# Patient Record
Sex: FEMALE | Race: OTHER | HISPANIC OR LATINO | Employment: FULL TIME | URBAN - METROPOLITAN AREA
[De-identification: names, ages, dates, MRNs, and addresses within clinical notes are randomized per-mention and may not be internally consistent; named-entity substitution may affect disease eponyms.]

---

## 2017-04-17 ENCOUNTER — GENERIC CONVERSION - ENCOUNTER (OUTPATIENT)
Dept: OTHER | Facility: OTHER | Age: 42
End: 2017-04-17

## 2017-04-24 ENCOUNTER — ALLSCRIPTS OFFICE VISIT (OUTPATIENT)
Dept: OTHER | Facility: OTHER | Age: 42
End: 2017-04-24

## 2017-04-24 DIAGNOSIS — Z12.31 ENCOUNTER FOR SCREENING MAMMOGRAM FOR MALIGNANT NEOPLASM OF BREAST: ICD-10-CM

## 2017-05-01 LAB
ADEQUACY: (HISTORICAL): NORMAL
CLINICIAN PROVIDIED ICD 9 OR 10 (HISTORICAL): NORMAL
COMMENT (HISTORICAL): NORMAL
DIAGNOSIS (HISTORICAL): NORMAL
NOTE: (HISTORICAL): NORMAL
PERFORMED BY (HISTORICAL): NORMAL
QC REVIEWED BY (HISTORICAL): NORMAL
REFLEX (HISTORICAL): NORMAL
TEST INFORMATION (HISTORICAL): NORMAL

## 2017-08-11 LAB — EXTERNAL HIV SCREEN: NORMAL

## 2017-09-29 ENCOUNTER — HOSPITAL ENCOUNTER (EMERGENCY)
Facility: HOSPITAL | Age: 42
Discharge: HOME | End: 2017-09-29
Admitting: EMERGENCY MEDICINE
Payer: COMMERCIAL

## 2017-09-29 VITALS
OXYGEN SATURATION: 98 % | TEMPERATURE: 98.1 F | SYSTOLIC BLOOD PRESSURE: 130 MMHG | DIASTOLIC BLOOD PRESSURE: 84 MMHG | HEART RATE: 82 BPM | RESPIRATION RATE: 18 BRPM | WEIGHT: 196.21 LBS

## 2017-09-29 DIAGNOSIS — G89.29 ACUTE EXACERBATION OF CHRONIC LOW BACK PAIN: Primary | ICD-10-CM

## 2017-09-29 DIAGNOSIS — M54.50 ACUTE EXACERBATION OF CHRONIC LOW BACK PAIN: Primary | ICD-10-CM

## 2017-09-29 LAB
BILIRUB UR QL STRIP: NEGATIVE
CLARITY UR: CLEAR
CLARITY, POC: CLEAR
COLOR UR: YELLOW
COLOR, POC: YELLOW
EXT PREG TEST URINE: NEGATIVE
GLUCOSE UR STRIP-MCNC: NEGATIVE MG/DL
HGB UR QL STRIP.AUTO: NEGATIVE
KETONES UR STRIP-MCNC: NEGATIVE MG/DL
LEUKOCYTE ESTERASE UR QL STRIP: NEGATIVE
NITRITE UR QL STRIP: NEGATIVE
PH UR STRIP.AUTO: 7 [PH] (ref 4.5–8)
PROT UR STRIP-MCNC: NEGATIVE MG/DL
SP GR UR STRIP.AUTO: 1.02 (ref 1–1.03)
UROBILINOGEN UR QL STRIP.AUTO: 0.2 E.U./DL

## 2017-09-29 PROCEDURE — 81002 URINALYSIS NONAUTO W/O SCOPE: CPT | Performed by: PHYSICIAN ASSISTANT

## 2017-09-29 PROCEDURE — 81025 URINE PREGNANCY TEST: CPT | Performed by: PHYSICIAN ASSISTANT

## 2017-09-29 PROCEDURE — 81003 URINALYSIS AUTO W/O SCOPE: CPT

## 2017-09-29 PROCEDURE — 96372 THER/PROPH/DIAG INJ SC/IM: CPT

## 2017-09-29 PROCEDURE — 99283 EMERGENCY DEPT VISIT LOW MDM: CPT

## 2017-09-29 RX ORDER — NAPROXEN 500 MG/1
500 TABLET ORAL 2 TIMES DAILY WITH MEALS
Qty: 12 TABLET | Refills: 0 | Status: SHIPPED | OUTPATIENT
Start: 2017-09-29 | End: 2018-06-23

## 2017-09-29 RX ORDER — KETOROLAC TROMETHAMINE 30 MG/ML
15 INJECTION, SOLUTION INTRAMUSCULAR; INTRAVENOUS ONCE
Status: COMPLETED | OUTPATIENT
Start: 2017-09-29 | End: 2017-09-29

## 2017-09-29 RX ORDER — METHOCARBAMOL 500 MG/1
500 TABLET, FILM COATED ORAL 3 TIMES DAILY
Qty: 10 TABLET | Refills: 0 | Status: SHIPPED | OUTPATIENT
Start: 2017-09-29 | End: 2018-06-23

## 2017-09-29 RX ADMIN — KETOROLAC TROMETHAMINE 15 MG: 30 INJECTION, SOLUTION INTRAMUSCULAR at 16:09

## 2017-09-29 NOTE — ED PROVIDER NOTES
History  Chief Complaint   Patient presents with    Back Pain     hx of back problems c/o lower back pain since last night denies recent strain or injury     Patient is a 44-year-old female who presents for evaluation of lower back pain  She states she does have a history of chronic back pain  She states this has been intermittent over the past few years  She did physical therapy approximately 2 years which did help her symptoms  She states her pain started last night after work  She describes a tight spasming sensation across her lower back that is constant in nature  Sitting and movement makes the pain worse  She tried Advil last night with minimal relief  She denies any injury or trauma  She denies any history of cancer or IV drug use  She denies fever, chills, nausea vomiting diarrhea, chest pain, shortness breath, abdominal pain, numbness or weakness, bladder or bowel dysfunction, saddle anesthesia, dysuria, hematuria, joint pain  None       History reviewed  No pertinent past medical history  History reviewed  No pertinent surgical history  History reviewed  No pertinent family history  I have reviewed and agree with the history as documented  Social History   Substance Use Topics    Smoking status: Never Smoker    Smokeless tobacco: Never Used    Alcohol use No        Review of Systems   Constitutional: Negative for chills and fever  Respiratory: Negative for shortness of breath  Cardiovascular: Negative for chest pain  Gastrointestinal: Negative for abdominal pain, diarrhea, nausea and vomiting  Genitourinary: Negative for dysuria and hematuria  Musculoskeletal: Positive for back pain  Negative for arthralgias, gait problem, neck pain and neck stiffness  Skin: Negative for rash  Neurological: Negative for weakness and numbness  All other systems reviewed and are negative        Physical Exam  ED Triage Vitals [09/29/17 1442]   Temperature Pulse Respirations Blood Pressure SpO2   98 1 °F (36 7 °C) 82 18 130/84 98 %      Temp Source Heart Rate Source Patient Position - Orthostatic VS BP Location FiO2 (%)   Oral Monitor Sitting Right arm --      Pain Score       Worst Possible Pain           Physical Exam   Constitutional: She is oriented to person, place, and time  She appears well-developed and well-nourished  No distress  HENT:   Head: Normocephalic and atraumatic  Right Ear: External ear normal    Left Ear: External ear normal    Nose: Nose normal    Eyes: Conjunctivae and EOM are normal    Neck: Normal range of motion  Cardiovascular: Normal rate, regular rhythm and normal heart sounds  Exam reveals no gallop and no friction rub  No murmur heard  Pulmonary/Chest: Effort normal and breath sounds normal  No respiratory distress  She has no wheezes  She has no rales  Abdominal: Soft  Bowel sounds are normal  She exhibits no distension  There is no tenderness  There is no guarding  Musculoskeletal:   Reproducible tenderness to palpation across the bilateral paravertebral and low back muscles of the lumbar region  No midline spinous process tenderness to palpation  No deformity or step-off  No erythema, swelling, ecchymosis or sign of trauma  No palpable spasm  Neurovascularly intact distally  Patellar deep tendon reflexes intact bilaterally  Extensor hallucis longus intact bilaterally  Able to move from standing to sitting to lying without difficulty  Negative straight leg raise bilaterally  Neurological: She is alert and oriented to person, place, and time  She has normal strength  She is not disoriented  No sensory deficit  Gait normal  GCS eye subscore is 4  GCS verbal subscore is 5  GCS motor subscore is 6  Skin: Skin is warm and dry  She is not diaphoretic  Psychiatric: She has a normal mood and affect  Her behavior is normal  Judgment and thought content normal    Nursing note and vitals reviewed        ED Medications  Medications ketorolac (TORADOL) 30 mg/mL injection 15 mg (15 mg Intramuscular Given 9/29/17 5607)       Diagnostic Studies  Labs Reviewed   POCT PREGNANCY, URINE - Normal       Result Value Ref Range Status    EXT PREG TEST UR (Ref: Negative) Negative   Final   POCT URINALYSIS DIPSTICK - Normal    Color, UA yellow   Final    Clarity, UA Clear   Final   ED URINE MACROSCOPIC - Normal    Color, UA Yellow   Final    Clarity, UA Clear   Final    pH, UA 7 0  4 5 - 8 0 Final    Leukocytes, UA Negative  Negative Final    Nitrite, UA Negative  Negative Final    Protein, UA Negative  Negative mg/dl Final    Glucose, UA Negative  Negative mg/dl Final    Ketones, UA Negative  Negative mg/dl Final    Urobilinogen, UA 0 2  0 2, 1 0 E U /dl E U /dl Final    Bilirubin, UA Negative  Negative Final    Blood, UA Negative  Negative Final    Specific Gravity, UA 1 020  1 003 - 1 030 Final    Narrative:     CLINITEK RESULT       No orders to display       Procedures  Procedures      Phone Contacts  ED Phone Contact    ED Course  ED Course                                MDM  Number of Diagnoses or Management Options  Acute exacerbation of chronic low back pain:   Diagnosis management comments: Plan- will check a pregnancy and urine dip  Neg preg and urine dip  Will give toradol here for pain  Discussed results with the patient and will give prescription for naproxen and Robaxin  Explained not to take naproxen for approximately 8 hours after leaving the ED since she received Toradol here  No driving or working while taking Robaxin as it may cause drowsiness  Instructed her to follow up with her family doctor in 1 day  Also given contact information for the on[facility]call [facility]  Instructed her to return to the ED if symptoms worsen or new symptoms arise  Patient states understanding and agrees with plan         Amount and/or Complexity of Data Reviewed  Clinical lab tests: ordered and reviewed    Risk of Complications, Morbidity, and/or Mortality  Presenting problems: low  Diagnostic procedures: low  Management options: low    Patient Progress  Patient progress: stable    CritCare Time    Disposition  Final diagnoses:   Acute exacerbation of chronic low back pain     ED Disposition     ED Disposition Condition Comment    Discharge  Ernesto Cortez discharge to home/self care  Condition at discharge: Good        Follow-up Information     Follow up With Specialties Details Why Contact Info Additional Information    Follow up with your family doctor in 1 day          [facility]  Schedule an appointment as soon as possible for a visit As needed if you do not have a doctor  [address] [facility] [address] [facility] Medicine   [address] [place] [address] Drive  140 [facility] Emergency Department Emergency Medicine  If symptoms worsen or new symptoms arise as discussed  [address] [facility]  [phone] [address] [facility] ED, [address] [facility] [place], [place], [address]        Discharge Medication List as of 9/29/2017  4:10 PM      START taking these medications    Details   methocarbamol (ROBAXIN) 500 mg tablet Take 1 tablet by mouth 3 (three) times a day, Starting Fri 9/29/2017, Print      naproxen (EC NAPROSYN) 500 MG EC tablet Take 1 tablet by mouth 2 (two) times a day with meals, Starting Fri 9/29/2017, Print           No discharge procedures on file      ED Provider  Electronically Signed by     Debra Fiore PA-C  09/30/17 2123

## 2017-09-29 NOTE — DISCHARGE INSTRUCTIONS
Acute Low Back Pain, Ambulatory Care   GENERAL INFORMATION:   Acute low back pain  is discomfort in your lower back area that lasts for less than 12 weeks  The word acute is used to describe pain that starts suddenly, worsens quickly, and lasts for a short time  Common symptoms include the following:   · Back stiffness or spasms    · Pain down the back or side of one leg    · Holding yourself in an unusual position or posture to decrease your back pain    · Not being able to find a sitting position that is comfortable    · Slow increase in your pain for 24 to 48 hours after you stress your back    · Tenderness on your lower back or severe pain when you move your back  Seek immediate care for the following symptoms:   · Severe pain    · Sudden stiffness and heaviness in both buttocks down to both legs    · Numbness or weakness in one leg, or pain in both legs    · Numbness in your genital area or across your lower back    · Unable to control your urine or bowel movements  Treatment for acute low back pain  may include any of the following:  · Medicines:      ¨ NSAIDs  help decrease swelling and pain or fever  This medicine is available with or without a doctor's order  NSAIDs can cause stomach bleeding or kidney problems in certain people  If you take blood thinner medicine, always ask your healthcare provider if NSAIDs are safe for you  Always read the medicine label and follow directions  ¨ Muscle relaxers  help decrease muscle spasms pain  ¨ Prescription pain medicine  may be given  Ask how to take this medicine safely  · Surgery  may be needed if your pain is severe and other treatments do not work  Surgery may be needed for conditions of the lumbar spine, such as herniated disc or spinal stenosis  Manage your symptoms:   · Sleep on a firm mattress  If you do not have a firm mattress, have someone move your mattress to the floor for a few days   A piece of plywood under your mattress can also help make it firmer  · Apply ice  on your lower back for 15 to 20 minutes every hour or as directed  Use an ice pack, or put crushed ice in a plastic bag  Cover it with a towel  Ice helps prevent tissue damage and decreases swelling and pain  You can alternate ice and heat  · Apply heat  on your lower back for 20 to 30 minutes every 2 hours for as many days as directed  Heat helps decrease pain and muscle spasms  · Go to physical therapy  A physical therapist teaches you exercises to help improve movement and strength, and to decrease pain  Prevent acute low back pain:   · Use proper body mechanics  ¨ Bend at the hips and knees when you  objects  Do not bend from the waist  Use your leg muscles as you lift the load  Do not use your back  Keep the object close to your chest as you lift it  Try not to twist or lift anything above your waist     ¨ Change your position often when you stand for long periods of time  Rest one foot on a small box or footrest, and then switch to the other foot often  ¨ Try not to sit for long periods of time  When you do, sit in a straight-backed chair with your feet flat on the floor  Never reach, pull, or push while you are sitting  · Exercise regularly  Warm up before you exercise  Do exercises that strengthen your back muscles  Ask about the best exercise plan for you  · Maintain a healthy weight  Ask your healthcare provider how much you should weigh  Ask him to help you create a weight loss plan if you are overweight  Follow up with your healthcare provider as directed:  Return for a follow-up visit if you still have pain after 1 to 3 weeks of treatment  You may need to visit an orthopedist if your back pain lasts more than 6 to 12 weeks  Write down your questions so you remember to ask them during your visits  CARE AGREEMENT:   You have the right to help plan your care  Learn about your health condition and how it may be treated   Discuss treatment options with your caregivers to decide what care you want to receive  You always have the right to refuse treatment  The above information is an  only  It is not intended as medical advice for individual conditions or treatments  Talk to your doctor, nurse or pharmacist before following any medical regimen to see if it is safe and effective for you  © 2014 [address] is for End User's use only and may not be sold, redistributed or otherwise used for commercial purposes  All illustrations and images included in CareNotes® are the copyrighted property of [employer]  or [facility]  Back Pain   WHAT YOU NEED TO KNOW:   Back pain is common  It can be caused by many conditions, such as arthritis or the breakdown of spinal discs  Your risk for back pain is increased by injuries, lack of activity, or repeated bending and twisting  You may feel sore or stiff on one or both sides of your back  The pain may spread to your buttocks or thighs  DISCHARGE INSTRUCTIONS:   Medicines:   · NSAIDs  help decrease swelling and pain  This medicine is available with or without a doctor's order  NSAIDs can cause stomach bleeding or kidney problems in certain people  If you take blood thinner medicine, always ask your healthcare provider if NSAIDs are safe for you  Always read the medicine label and follow directions  · Acetaminophen  decreases pain  It is available without a doctor's order  Ask how much to take and how often to take it  Follow directions  Acetaminophen can cause liver damage if not taken correctly  · Prescription pain medicine  may be given  Ask your healthcare provider how to take this medicine safely  · Take your medicine as directed  Contact your healthcare provider if you think your medicine is not helping or if you have side effects  Tell him or her if you are allergic to any medicine  Keep a list of the medicines, vitamins, and herbs you take  Include the amounts, and when and why you take them  Bring the list or the pill bottles to follow-up visits  Carry your medicine list with you in case of an emergency  Follow up with your healthcare provider in 2 weeks, or as directed:  Write down your questions so you remember to ask them during your visits  How to manage your back pain:   · Apply ice  on your back or affected area for 15 to 20 minutes every hour or as directed  Use an ice pack, or put crushed ice in a plastic bag  Cover it with a towel  Ice helps prevent tissue damage and decreases pain  · Apply heat  on your back or affected area for 20 to 30 minutes every 2 hours for as many days as directed  Heat helps decrease pain and muscle spasms  · Stay active  as much as you can without causing more pain  Bed rest could make your back pain worse  Avoid heavy lifting until your pain is gone  Return to the emergency department if:   · You have pain, numbness, or weakness in one or both legs  · Your pain becomes so severe that you cannot walk  · You cannot control your urine or bowel movements  · You have severe back pain with chest pain  · You have severe back pain, nausea, and vomiting  · You have severe back pain that spreads to your side or genital area  Contact your healthcare provider if:   · You have back pain that does not get better with rest and pain medicine  · You have a fever  · You have pain that worsens when you are on your back or when you rest     · You have pain that worsens when you cough or sneeze  · You lose weight without trying  · You have questions or concerns about your condition or care  © 2017 [address] Information is for End User's use only and may not be sold, redistributed or otherwise used for commercial purposes  All illustrations and images included in CareNotes® are the copyrighted property of [employer]  or [employer]    The above information is an  only  It is not intended as medical advice for individual conditions or treatments  Talk to your doctor, nurse or pharmacist before following any medical regimen to see if it is safe and effective for you

## 2017-09-29 NOTE — ED NOTES
Patient reports since midnight has had pain in mid lower back       Emilie Vazquez RN  09/29/17 8163

## 2018-01-09 NOTE — MISCELLANEOUS
Provider Comments  Provider Comments:   Pt no showed today 04/17/2017 lmom asking pt to give office a call back      Signatures   Electronically signed by : Yissel Rice, ; Apr 17 2017  9:41AM EST                       (Author)

## 2018-01-13 VITALS
SYSTOLIC BLOOD PRESSURE: 110 MMHG | HEIGHT: 68 IN | BODY MASS INDEX: 29.55 KG/M2 | WEIGHT: 195 LBS | DIASTOLIC BLOOD PRESSURE: 70 MMHG

## 2018-05-10 ENCOUNTER — TRANSCRIBE ORDERS (OUTPATIENT)
Dept: ADMINISTRATIVE | Facility: HOSPITAL | Age: 43
End: 2018-05-10

## 2018-05-10 ENCOUNTER — APPOINTMENT (OUTPATIENT)
Dept: LAB | Facility: HOSPITAL | Age: 43
End: 2018-05-10
Attending: OBSTETRICS & GYNECOLOGY
Payer: COMMERCIAL

## 2018-05-10 ENCOUNTER — ULTRASOUND (OUTPATIENT)
Dept: OBGYN CLINIC | Facility: CLINIC | Age: 43
End: 2018-05-10
Payer: COMMERCIAL

## 2018-05-10 ENCOUNTER — ANNUAL EXAM (OUTPATIENT)
Dept: OBGYN CLINIC | Facility: CLINIC | Age: 43
End: 2018-05-10

## 2018-05-10 VITALS
BODY MASS INDEX: 30.45 KG/M2 | HEIGHT: 67 IN | SYSTOLIC BLOOD PRESSURE: 114 MMHG | WEIGHT: 194 LBS | DIASTOLIC BLOOD PRESSURE: 72 MMHG

## 2018-05-10 DIAGNOSIS — Z98.51 TUBAL LIGATION STATUS: ICD-10-CM

## 2018-05-10 DIAGNOSIS — Z12.4 SCREENING FOR CERVICAL CANCER: ICD-10-CM

## 2018-05-10 DIAGNOSIS — Z01.419 WOMEN'S ANNUAL ROUTINE GYNECOLOGICAL EXAMINATION: Primary | ICD-10-CM

## 2018-05-10 DIAGNOSIS — R23.2 FLUSHING: ICD-10-CM

## 2018-05-10 DIAGNOSIS — N91.4 SECONDARY OLIGOMENORRHEA: ICD-10-CM

## 2018-05-10 DIAGNOSIS — R23.2 HOT FLASHES: ICD-10-CM

## 2018-05-10 DIAGNOSIS — N91.2 AMENORRHEA: ICD-10-CM

## 2018-05-10 DIAGNOSIS — R23.2 FLUSHING: Primary | ICD-10-CM

## 2018-05-10 DIAGNOSIS — B35.4 TINEA CORPORIS: ICD-10-CM

## 2018-05-10 DIAGNOSIS — N83.292 COMPLEX CYST OF LEFT OVARY: Primary | ICD-10-CM

## 2018-05-10 LAB
B-HCG SERPL-ACNC: <2 MIU/ML
ERYTHROCYTE [DISTWIDTH] IN BLOOD BY AUTOMATED COUNT: 13.6 % (ref 11.6–15.1)
FSH SERPL-ACNC: 1.8 MIU/ML
HCT VFR BLD AUTO: 42.8 % (ref 34.8–46.1)
HGB BLD-MCNC: 14.4 G/DL (ref 11.5–15.4)
LH SERPL-ACNC: 1.2 MIU/ML
MCH RBC QN AUTO: 28.7 PG (ref 26.8–34.3)
MCHC RBC AUTO-ENTMCNC: 33.6 G/DL (ref 31.4–37.4)
MCV RBC AUTO: 85 FL (ref 82–98)
PLATELET # BLD AUTO: 225 THOUSANDS/UL (ref 149–390)
PMV BLD AUTO: 10.7 FL (ref 8.9–12.7)
PROLACTIN SERPL-MCNC: 6.3 NG/ML
RBC # BLD AUTO: 5.02 MILLION/UL (ref 3.81–5.12)
SL AMB POCT URINE HCG: NORMAL
TSH SERPL DL<=0.05 MIU/L-ACNC: 2.05 UIU/ML (ref 0.36–3.74)
WBC # BLD AUTO: 7.75 THOUSAND/UL (ref 4.31–10.16)

## 2018-05-10 PROCEDURE — 81025 URINE PREGNANCY TEST: CPT | Performed by: OBSTETRICS & GYNECOLOGY

## 2018-05-10 PROCEDURE — 83002 ASSAY OF GONADOTROPIN (LH): CPT

## 2018-05-10 PROCEDURE — 36415 COLL VENOUS BLD VENIPUNCTURE: CPT

## 2018-05-10 PROCEDURE — 84443 ASSAY THYROID STIM HORMONE: CPT

## 2018-05-10 PROCEDURE — 84146 ASSAY OF PROLACTIN: CPT

## 2018-05-10 PROCEDURE — 83001 ASSAY OF GONADOTROPIN (FSH): CPT

## 2018-05-10 PROCEDURE — 85027 COMPLETE CBC AUTOMATED: CPT

## 2018-05-10 PROCEDURE — 76830 TRANSVAGINAL US NON-OB: CPT

## 2018-05-10 PROCEDURE — S0612 ANNUAL GYNECOLOGICAL EXAMINA: HCPCS | Performed by: OBSTETRICS & GYNECOLOGY

## 2018-05-10 PROCEDURE — 76856 US EXAM PELVIC COMPLETE: CPT

## 2018-05-10 PROCEDURE — 84702 CHORIONIC GONADOTROPIN TEST: CPT

## 2018-05-10 RX ORDER — CLOTRIMAZOLE AND BETAMETHASONE DIPROPIONATE 10; .64 MG/G; MG/G
CREAM TOPICAL 2 TIMES DAILY
Qty: 30 G | Refills: 0 | Status: SHIPPED | OUTPATIENT
Start: 2018-05-10 | End: 2020-06-17 | Stop reason: SDUPTHER

## 2018-05-10 NOTE — PROGRESS NOTES
AMB US Pelvic Non OB  Date/Time: 5/10/2018 10:34 AM  Performed by: Linton Primrose  Authorized by: Steph Sosa     Procedure details:     Indications: ovarian cysts, non-obstetric abdominal pain and non-obstetric vaginal bleeding      Technique:  US Pelvic, Non-OB with complete exam  Uterine findings:     Diameter (mm):  85    Length (mm):  47    Width (mm):  55    Uterine adhesions: not identified      Adnexal mass: identified      Location:  Mass left    Polyps: not identified      Myomas: not identified      Endometrial stripe: identified      Endometrium thickness (mm):  11  Left ovary findings:     Left ovary:  Visualized    Cysts: identified      Diameter (mm):  42    Length (mm):  30    Width (mm):  30    Flow:  Absent  Right ovary findings:     Right ovary:  Visualized    Diameter (mm):  26    Length (mm):  20    Width (mm):  25  Other findings:     Free pelvic fluid: not identified      Free peritoneal fluid: not identified    Post-Procedure Details:     Impression:  LT Complex cystic mass=24 x 23 x 20 mm,with no doppler flow within    Tolerance:   Tolerated well, no immediate complications

## 2018-05-10 NOTE — PROGRESS NOTES
Assessment   Annual well-woman exam  Secondary oligomenorrhea  Menopausal syndrome with hot flashes and vaginal dryness  Tinea corporis, chest and axilla  Status post tubal ligation         Plan   Screening laboratory studies  Pelvic ultrasound  Bilateral mammography  Lotrisone cream for tinea corporis  Return in 2 weeks to review test results and plan of care   All questions answered  Await pap smear results  Subjective annual exam     Ernesto Cortez is a 41 y o  female who presents for annual exam  Periods are irregular, lasting 3 days  Dysmenorrhea:mild, occurring throughout menses  Cyclic symptoms include none  No intermenstrual bleeding, spotting, or discharge  The patient reports that there is not domestic violence in her life  Current contraception: tubal ligation  History of abnormal Pap smear: no  Family history of uterine or ovarian cancer: no  Regular self breast exam: yes  History of abnormal mammogram: no  Family history of breast cancer: no  History of abnormal lipids: no  Menstrual History:  OB History     No data available         Menarche age: 17  Patient's last menstrual period was 03/12/2018 (exact date)  Review of Systems  Pertinent items are noted in HPI  Objective here for annual exam     /72 (BP Location: Left arm, Patient Position: Sitting, Cuff Size: Large)   Ht 5' 7" (1 702 m)   Wt 88 kg (194 lb)   LMP 03/12/2018 (Exact Date)   Breastfeeding?  No   BMI 30 38 kg/m²     General:   alert and oriented, in no acute distress, alert, appears stated age and cooperative   Heart: regular rate and rhythm, S1, S2 normal, no murmur, click, rub or gallop   Lungs: clear to auscultation bilaterally   Abdomen: soft, non-tender, without masses or organomegaly   Vulva: normal   Vagina: normal mucosa   Cervix: no bleeding following Pap, no cervical motion tenderness, no lesions and retroverted   Uterus: normal size, mobile, non-tender, normal shape and consistency, retroverted Adnexa: normal adnexa   Breast exam bilateral breast exam in the sitting and supine position with chaperone present no visible or palpable breast lesions or masses noted  No supraclavicular or axillary lymphadenopathy noted no nipple discharge noted   She does have dermatitis type rash along the outer cup edge of her bra and in her axilla with dryness and irritation and urticaria

## 2018-05-11 ENCOUNTER — HOSPITAL ENCOUNTER (OUTPATIENT)
Dept: RADIOLOGY | Age: 43
Discharge: HOME | End: 2018-05-11
Payer: COMMERCIAL

## 2018-05-11 DIAGNOSIS — Z01.419 WOMEN'S ANNUAL ROUTINE GYNECOLOGICAL EXAMINATION: ICD-10-CM

## 2018-05-11 PROCEDURE — 77067 SCR MAMMO BI INCL CAD: CPT

## 2018-05-11 PROCEDURE — 77063 BREAST TOMOSYNTHESIS BI: CPT

## 2018-05-14 LAB
CLINICAL INFO: NORMAL
CYTO CVX: NORMAL
DATE PREVIOUS BX: NORMAL
LMP START DATE: NORMAL
SL AMB PREV. PAP:: NORMAL
SPECIMEN SOURCE CVX/VAG CYTO: NORMAL

## 2018-05-24 ENCOUNTER — OFFICE VISIT (OUTPATIENT)
Dept: OBGYN CLINIC | Facility: CLINIC | Age: 43
End: 2018-05-24
Payer: COMMERCIAL

## 2018-05-24 VITALS
BODY MASS INDEX: 30.64 KG/M2 | SYSTOLIC BLOOD PRESSURE: 120 MMHG | WEIGHT: 195.2 LBS | HEIGHT: 67 IN | DIASTOLIC BLOOD PRESSURE: 82 MMHG

## 2018-05-24 DIAGNOSIS — N83.292 OTHER OVARIAN CYST, LEFT SIDE: Primary | ICD-10-CM

## 2018-05-24 DIAGNOSIS — N95.1 MENOPAUSAL SYNDROME (HOT FLASHES): ICD-10-CM

## 2018-05-24 PROCEDURE — 99214 OFFICE O/P EST MOD 30 MIN: CPT | Performed by: OBSTETRICS & GYNECOLOGY

## 2018-05-24 RX ORDER — ACETAMINOPHEN AND CODEINE PHOSPHATE 120; 12 MG/5ML; MG/5ML
1 SOLUTION ORAL DAILY
Qty: 30 TABLET | Refills: 2 | Status: SHIPPED | OUTPATIENT
Start: 2018-05-24 | End: 2018-07-11 | Stop reason: SDUPTHER

## 2018-05-24 NOTE — PROGRESS NOTES
Assessment/Plan:   Here for test results  Secondary oligomenorrhea  Left ovarian cyst    Plan  Repeat pelvic ultrasound in 6 weeks     Diagnoses and all orders for this visit:    Other ovarian cyst, left side  -     US pelvis complete non OB; Future    Menopausal syndrome (hot flashes)  -     norethindrone (MICRONOR) 0 35 MG tablet; Take 1 tablet (0 35 mg total) by mouth daily for 30 days        Subjective:  No acute distress     Patient ID: Abby Mays is a 41 y o  female  HPI 57-year-old female here for test results  She was recently here for annual exam complaining of irregular menstrual cycles lasting only 3 days  Since her last visit she did get a menstrual cycle which was normal  Screening laboratory studies were done [facility] LH prolactin and TSH were all within normal limits  She has had a tubal ligation in the past   Patient not having any pain symptoms  Follow-up pelvic ultrasound done today revealed a left ovarian cyst measuring 24 x 23 x 20 millimeters  Reviewed all these findings with the patient recommend follow-up pelvic ultrasound in 3 months  Patient is currently on norethindrone 0 35 milligrams 1 daily for her hot flashes which we her just starting today  Review of Systems   Genitourinary: Positive for menstrual problem  All other systems reviewed and are negative  Objective:  No acute distress     Physical Exam   Constitutional: She is oriented to person, place, and time  She appears well-developed and well-nourished  HENT:   Head: Normocephalic and atraumatic  Eyes: Pupils are equal, round, and reactive to light  Neck: Normal range of motion  Genitourinary: Vagina normal and uterus normal    Musculoskeletal: Normal range of motion  Neurological: She is alert and oriented to person, place, and time  Skin: Skin is warm and dry  Psychiatric: She has a normal mood and affect   Her behavior is normal  Judgment and thought content normal

## 2018-06-23 ENCOUNTER — HOSPITAL ENCOUNTER (EMERGENCY)
Facility: HOSPITAL | Age: 43
Discharge: HOME | End: 2018-06-23
Attending: EMERGENCY MEDICINE
Payer: COMMERCIAL

## 2018-06-23 VITALS
SYSTOLIC BLOOD PRESSURE: 135 MMHG | OXYGEN SATURATION: 99 % | RESPIRATION RATE: 18 BRPM | TEMPERATURE: 98.2 F | HEART RATE: 80 BPM | DIASTOLIC BLOOD PRESSURE: 66 MMHG

## 2018-06-23 DIAGNOSIS — M25.50 ARTHRALGIA: Primary | ICD-10-CM

## 2018-06-23 LAB
BILIRUB UR QL STRIP: NEGATIVE
CLARITY UR: CLEAR
COLOR UR: YELLOW
EXT PREG TEST URINE: NORMAL
GLUCOSE UR STRIP-MCNC: NEGATIVE MG/DL
HGB UR QL STRIP.AUTO: NEGATIVE
KETONES UR STRIP-MCNC: NEGATIVE MG/DL
LEUKOCYTE ESTERASE UR QL STRIP: NEGATIVE
NITRITE UR QL STRIP: NEGATIVE
PH UR STRIP.AUTO: 6 [PH] (ref 4.5–8)
PROT UR STRIP-MCNC: NEGATIVE MG/DL
SP GR UR STRIP.AUTO: 1.02 (ref 1–1.03)
UROBILINOGEN UR QL STRIP.AUTO: 1 E.U./DL

## 2018-06-23 PROCEDURE — 81025 URINE PREGNANCY TEST: CPT | Performed by: NURSE PRACTITIONER

## 2018-06-23 PROCEDURE — 81003 URINALYSIS AUTO W/O SCOPE: CPT

## 2018-06-23 PROCEDURE — 99283 EMERGENCY DEPT VISIT LOW MDM: CPT

## 2018-06-23 RX ORDER — NAPROXEN 250 MG/1
500 TABLET ORAL ONCE
Status: COMPLETED | OUTPATIENT
Start: 2018-06-23 | End: 2018-06-23

## 2018-06-23 RX ORDER — NAPROXEN 500 MG/1
500 TABLET ORAL 2 TIMES DAILY WITH MEALS
Qty: 30 TABLET | Refills: 0 | Status: SHIPPED | OUTPATIENT
Start: 2018-06-23 | End: 2019-02-07 | Stop reason: ALTCHOICE

## 2018-06-23 RX ADMIN — NAPROXEN 500 MG: 250 TABLET ORAL at 09:00

## 2018-06-23 NOTE — ED PROVIDER NOTES
History  Chief Complaint   Patient presents with    Wrist Pain     pt c/o b/l wrist pain, hand pain, right knee pain states taking Advil without relief     This is a 41year old female who comes to the ED with c/o right neck pain, B/L wrist pain, knee pain and ankle pain that started a few days ago and took Aleve last night w/o relief  Pt denies any injury  She denies any known hx of autoimmune disorder  She states she works at [employer] and does not do lifting  Pt states she is under stress  She denies any rashes or insect bites  LMP 6/14/18         History provided by:  Patient and medical records   used: No    Wrist Pain   Severity:  Mild  Onset quality:  Gradual  Timing:  Constant  Progression:  Unchanged  Chronicity:  New      Prior to Admission Medications   Prescriptions Last Dose Informant Patient Reported? Taking? clotrimazole-betamethasone (LOTRISONE) 1-0 05 % cream   No Yes   Sig: Apply topically 2 (two) times a day   norethindrone (MICRONOR) 0 35 MG tablet Unknown at Unknown time  No No   Sig: Take 1 tablet (0 35 mg total) by mouth daily for 30 days      Facility-Administered Medications: None       History reviewed  No pertinent past medical history  History reviewed  No pertinent surgical history  History reviewed  No pertinent family history  I have reviewed and agree with the history as documented  Social History   Substance Use Topics    Smoking status: Never Smoker    Smokeless tobacco: Never Used    Alcohol use No        Review of Systems   Constitutional: Negative  HENT: Negative  Eyes: Negative  Respiratory: Negative  Cardiovascular: Negative  Gastrointestinal: Negative  Endocrine: Negative  Genitourinary: Negative  Musculoskeletal: Positive for arthralgias, neck pain and neck stiffness  Skin: Negative  Allergic/Immunologic: Negative  Neurological: Negative  Hematological: Negative  Psychiatric/Behavioral: Negative  Physical Exam  Physical Exam   Constitutional: She is oriented to person, place, and time  She appears well-developed and well-nourished  No distress  HENT:   Head: Normocephalic and atraumatic  Eyes: EOM are normal  Pupils are equal, round, and reactive to light  Neck: Normal range of motion  Neck supple  Cardiovascular: Normal rate, regular rhythm and normal heart sounds  Pulmonary/Chest: Effort normal and breath sounds normal    Abdominal: Soft  Bowel sounds are normal  She exhibits no distension  There is no tenderness  Musculoskeletal: Normal range of motion  She exhibits no edema, tenderness or deformity  Pt has pain with phalens sign   No edema, no deformity     Neurological: She is alert and oriented to person, place, and time  She displays normal reflexes  No cranial nerve deficit or sensory deficit  She exhibits normal muscle tone  Coordination normal    Skin: Skin is warm and dry  Capillary refill takes less than 2 seconds  She is not diaphoretic  Psychiatric: She has a normal mood and affect  Her behavior is normal  Judgment and thought content normal    Nursing note and vitals reviewed        Vital Signs  ED Triage Vitals [06/23/18 0808]   Temperature Pulse Respirations Blood Pressure SpO2   98 2 °F (36 8 °C) 80 18 135/66 99 %      Temp Source Heart Rate Source Patient Position - Orthostatic VS BP Location FiO2 (%)   Temporal Monitor Sitting Right arm --      Pain Score       8           Vitals:    06/23/18 0808   BP: 135/66   Pulse: 80   Patient Position - Orthostatic VS: Sitting       Visual Acuity      ED Medications  Medications   naproxen (NAPROSYN) tablet 500 mg (500 mg Oral Given 6/23/18 0900)       Diagnostic Studies  Results Reviewed     Procedure Component Value Units Date/Time    POCT urinalysis dipstick [[id number]]  (Abnormal) Resulted:  06/23/18 0848    Lab Status:  Final result Specimen:  Urine Updated:  06/23/18 0848    POCT pregnancy, urine [[id number]]  (Normal) Resulted:  06/23/18 0848    Lab Status:  Final result Updated:  06/23/18 0848     EXT PREG TEST UR (Ref: Negative) HCG = neg (-)    ED Urine Macroscopic [[id number]] Collected:  06/23/18 0851    Lab Status:  Final result Specimen:  Urine Updated:  06/23/18 0847     Color, UA Yellow     Clarity, UA Clear     pH, UA 6 0     Leukocytes, UA Negative     Nitrite, UA Negative     Protein, UA Negative mg/dl      Glucose, UA Negative mg/dl      Ketones, UA Negative mg/dl      Urobilinogen, UA 1 0 E U /dl      Bilirubin, UA Negative     Blood, UA Negative     Specific Gravity, UA 1 025    Narrative:       CLINITEK RESULT                 No orders to display              Procedures  Procedures       Phone Contacts  ED Phone Contact    ED Course                               MDM  Number of Diagnoses or Management Options  Diagnosis management comments: Differential diagnosis:  Arthralgias  Autoimmune disorders  Arthritis  Stress     Plan ua  uahcg  Naproxen    Pt verbalizes understanding of all d/c instructions    I have informed pt that she does need a PCP work up for autoimmune vs arthritis         Amount and/or Complexity of Data Reviewed  Clinical lab tests: ordered and reviewed  Review and summarize past medical records: yes      CritCare Time    Disposition  Final diagnoses:   Arthralgia - Neck, bilateral hands, legs, ankles  Multiple areas     Time reflects when diagnosis was documented in both MDM as applicable and the Disposition within this note     Time User Action Codes Description Comment    6/23/2018  8:52 AM Ross Maria Alejandra Add [M25 50] Arthralgia     6/23/2018  8:52 AM Rossmima Bess Modify [M25 50] Arthralgia Neck, bilateral hands, legs, ankles  Multiple areas      ED Disposition     ED Disposition Condition Comment    Discharge  Bethany Orozco discharge to home/self care      Condition at discharge: Good        Follow-up Information     Follow up With Specialties Details Why Contact Info Additional Information       follow up with your PCP       [facility] Emergency Department Emergency Medicine  If symptoms worsen [address] [address] ED, [address]  , [place], [place], [address]          Discharge Medication List as of 6/23/2018  8:53 AM      START taking these medications    Details   naproxen (NAPROSYN) 500 mg tablet Take 1 tablet (500 mg total) by mouth 2 (two) times a day with meals, Starting Sat 6/23/2018, Print         CONTINUE these medications which have NOT CHANGED    Details   clotrimazole-betamethasone (LOTRISONE) 1-0 05 % cream Apply topically 2 (two) times a day, Starting Thu 5/10/2018, Normal      norethindrone (MICRONOR) 0 35 MG tablet Take 1 tablet (0 35 mg total) by mouth daily for 30 days, Starting Thu 5/24/2018, Until Sat 6/23/2018, Normal           No discharge procedures on file      ED Provider  Electronically Signed by           Anthony Adler  06/23/18 5553

## 2018-06-23 NOTE — DISCHARGE INSTRUCTIONS
You have multiple areas of arthralgias, you are to see your PCP for further work up  You are to take the Naproxen as prescribed    Arthralgia   WHAT YOU NEED TO KNOW:   Arthralgia is pain in one or more joints, with no inflammation  It may be short-term and get better within 6 to 8 weeks  Arthralgia can be an early sign of arthritis  Arthralgia may be caused by a medical condition, such as a hormone disorder or a tumor  It may also be caused by an infection or injury  DISCHARGE INSTRUCTIONS:   Medicines: The following medicines may  be ordered for you:  · Acetaminophen  decreases pain  Ask how much to take and how often to take it  Follow directions  Acetaminophen can cause liver damage if not taken correctly  · NSAIDs  decrease pain and prevent swelling  Ask your healthcare provider which medicine is right for you  Ask how much to take and when to take it  Take as directed  NSAIDs can cause stomach bleeding and kidney problems if not taken correctly  · Pain relief cream  decreases pain  Use this cream as directed  · Take your medicine as directed  Contact your healthcare provider if you think your medicine is not helping or if you have side effects  Tell him of her if you are allergic to any medicine  Keep a list of the medicines, vitamins, and herbs you take  Include the amounts, and when and why you take them  Bring the list or the pill bottles to follow-up visits  Carry your medicine list with you in case of an emergency  Follow up with your healthcare provider or specialist as directed:  Write down your questions so you remember to ask them during your visits  Self-care:   · Apply heat  to help decrease pain  Use a heating pad or heat wrap  Apply heat for 20 to 30 minutes every 2 hours for as many days as directed  · Rest  as much as possible  Avoid activities that cause joint pain  · Apply ice  to help decrease swelling and pain  Ice may also help prevent tissue damage   Use an ice pack, or put crushed ice in a plastic bag  Cover it with a towel and place it on your painful joint for 15 to 20 minutes every hour or as directed  · Support  the joint with a brace or elastic wrap as directed  · Elevate  your joint above the level of your heart as often as you can to help decrease swelling and pain  Prop your painful joint on pillows or blankets to keep it elevated comfortably  · Lose weight  if you are overweight  Extra weight can put pressure on your joints and cause more pain  Ask your healthcare provider how much you should weigh  Ask him to help you create a weight loss plan  · Exercise  regularly to help improve joint movement and to decrease pain  Ask about the best exercise plan for you  Low-impact exercises can help take the pressure off your joints  Examples are walking, swimming, and water aerobics  Physical therapy:  A physical therapist teaches you exercises to help improve movement and strength, and to decrease pain  Ask your healthcare provider if physical therapy is right for you  Contact your healthcare provider or specialist if:   · You have a fever  · You continue to have joint pain that cannot be relieved with heat, ice, or medicine  · You have pain and inflammation around your joint  · You have questions or concerns about your condition or care  Return to the emergency department if:   · You have sudden, severe pain when you move your joint  · You have a fever and shaking chills  · You cannot move your joint  · You lose feeling on the side of your body where you have the painful joint  © 2017 [employer] Information is for End User's use only and may not be sold, redistributed or otherwise used for commercial purposes  All illustrations and images included in CareNotes® are the copyrighted property of A D A M , Inc  or [employer]  The above information is an  only   It is not intended as medical advice for individual conditions or treatments  Talk to your doctor, nurse or pharmacist before following any medical regimen to see if it is safe and effective for you

## 2018-06-23 NOTE — ED NOTES
Bilateral wrist, hand, knee pain  Started Thurs night in knees, has been spreading to hands/wrist since that time         Rosy Fong, DEIDRE  06/23/18 9453

## 2018-06-25 ENCOUNTER — OFFICE VISIT (OUTPATIENT)
Dept: INTERNAL MEDICINE CLINIC | Facility: CLINIC | Age: 43
End: 2018-06-25
Payer: COMMERCIAL

## 2018-06-25 VITALS
RESPIRATION RATE: 15 BRPM | DIASTOLIC BLOOD PRESSURE: 60 MMHG | HEART RATE: 68 BPM | HEIGHT: 67 IN | TEMPERATURE: 97.8 F | WEIGHT: 191.2 LBS | SYSTOLIC BLOOD PRESSURE: 108 MMHG | BODY MASS INDEX: 30.01 KG/M2

## 2018-06-25 DIAGNOSIS — S16.1XXA STRAIN OF NECK MUSCLE, INITIAL ENCOUNTER: ICD-10-CM

## 2018-06-25 DIAGNOSIS — L83 ACANTHOSIS NIGRICANS: ICD-10-CM

## 2018-06-25 DIAGNOSIS — M25.50 POLYARTHRALGIA: Primary | ICD-10-CM

## 2018-06-25 DIAGNOSIS — M54.16 LUMBAR RADICULOPATHY: ICD-10-CM

## 2018-06-25 PROCEDURE — 99204 OFFICE O/P NEW MOD 45 MIN: CPT | Performed by: INTERNAL MEDICINE

## 2018-06-25 RX ORDER — LIDOCAINE 40 MG/G
CREAM TOPICAL AS NEEDED
Qty: 30 G | Refills: 0 | Status: SHIPPED | OUTPATIENT
Start: 2018-06-25 | End: 2019-02-07 | Stop reason: ALTCHOICE

## 2018-06-25 RX ORDER — CYCLOBENZAPRINE HCL 5 MG
TABLET ORAL
Qty: 30 TABLET | Refills: 0 | Status: SHIPPED | OUTPATIENT
Start: 2018-06-25 | End: 2019-02-07 | Stop reason: ALTCHOICE

## 2018-06-25 NOTE — LETTER
June 26, 2018     Patient: Dannie Michaud   YOB: 1975   Date of Visit: 6/25/2018       To Whom it May Concern:    Vicki Zuñiga is under my professional care  She was seen in my office on 6/25/2018  She may return to work on 7/2/18  If you have any questions or concerns, please don't hesitate to call           Sincerely,          Charlie Wing MD        CC: No Recipients

## 2018-06-25 NOTE — PROGRESS NOTES
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  Continue naproxen  Cyclobenzaprine for muscular strain around the neck  Blood work to rule out any other significant inflammatory causes of her arthritis  She has a canthus is nigricans with a family history of diabetes, screen for diabetes as well       Diagnoses and all orders for this visit:    Polyarthralgia  -     cyclobenzaprine (FLEXERIL) 5 mg tablet; 1-2 tab at bedtime as needed for muscle spasm  -     lidocaine (LMX) 4 % cream; Apply topically as needed for mild pain  -     CBC and differential  -     Comprehensive metabolic panel  -     TSH, 3rd generation with Free T4 reflex  -     Sedimentation rate, automated  -     C-reactive protein  -     Vitamin D 25 hydroxy  -     MAGDALENO Screen w/ Reflex to Titer/Pattern  -     RF Screen w/ Reflex to Titer  -     Cyclic citrul peptide antibody, IgG    Lumbar radiculopathy  -     cyclobenzaprine (FLEXERIL) 5 mg tablet; 1-2 tab at bedtime as needed for muscle spasm  -     lidocaine (LMX) 4 % cream; Apply topically as needed for mild pain  -     CBC and differential  -     Comprehensive metabolic panel  -     TSH, 3rd generation with Free T4 reflex  -     Sedimentation rate, automated  -     C-reactive protein  -     Vitamin D 25 hydroxy  -     MAGDALENO Screen w/ Reflex to Titer/Pattern  -     RF Screen w/ Reflex to Titer  -     Cyclic citrul peptide antibody, IgG    Strain of neck muscle, initial encounter  -     cyclobenzaprine (FLEXERIL) 5 mg tablet; 1-2 tab at bedtime as needed for muscle spasm  -     lidocaine (LMX) 4 % cream; Apply topically as needed for mild pain  -     CBC and differential  -     Comprehensive metabolic panel  -     TSH, 3rd generation with Free T4 reflex  -     Sedimentation rate, automated  -     C-reactive protein  -     Vitamin D 25 hydroxy  -     MAGDALENO Screen w/ Reflex to Titer/Pattern  -     RF Screen w/ Reflex to Titer  -     Cyclic citrul peptide antibody, IgG    Acanthosis nigricans  -     Hemoglobin A1C          Subjective:   Chief Complaint   Patient presents with    Establish Care    Pain     neck bilateral legs, bilateral wrist        Patient ID: Jeffrey Uriarte is a 41 y o  female  She comes in as a new patient to establish care  She notes that for the last 1 week she has had joint pain in several joints  She started of with neck pain, then had shoulder pain, recently knees have been hurting along with ankles  No fevers or symptoms of cough or cold  No urinary symptoms, no vaginal discharge  No swelling or redness in any of these joints  She has a history of chronic low back pain which has been stay overall stable recently  She was recently started on hormonal supplements for hot flashes  The following portions of the patient's history were reviewed and updated as appropriate: current medications, past medical history, past social history and past surgical history      PHQ-9 Depression Screening    PHQ-9:    Frequency of the following problems over the past two weeks:       Little interest or pleasure in doing things:  0 - not at all  Feeling down, depressed, or hopeless:  0 - not at all  PHQ-2 Score:  0           Current Outpatient Prescriptions:     naproxen (NAPROSYN) 500 mg tablet, Take 1 tablet (500 mg total) by mouth 2 (two) times a day with meals, Disp: 30 tablet, Rfl: 0    clotrimazole-betamethasone (LOTRISONE) 1-0 05 % cream, Apply topically 2 (two) times a day, Disp: 30 g, Rfl: 0    cyclobenzaprine (FLEXERIL) 5 mg tablet, 1-2 tab at bedtime as needed for muscle spasm, Disp: 30 tablet, Rfl: 0    lidocaine (LMX) 4 % cream, Apply topically as needed for mild pain, Disp: 30 g, Rfl: 0    norethindrone (MICRONOR) 0 35 MG tablet, Take 1 tablet (0 35 mg total) by mouth daily for 30 days, Disp: 30 tablet, Rfl: 2    Review of Systems   Constitutional: Negative for fatigue, fever and unexpected weight change  HENT: Negative for ear pain, hearing loss and sore throat  Eyes: Negative for pain and discharge  Respiratory: Negative for cough, chest tightness and shortness of breath  Cardiovascular: Negative for chest pain and palpitations  Gastrointestinal: Negative for abdominal pain, blood in stool, constipation, diarrhea and nausea  Genitourinary: Negative for dysuria, frequency and hematuria  Musculoskeletal: Positive for arthralgias and neck pain  Negative for joint swelling  Skin: Negative for rash  Allergic/Immunologic: Negative for immunocompromised state  Neurological: Positive for numbness  Negative for dizziness and headaches  Hematological: Negative for adenopathy  Psychiatric/Behavioral: Negative for confusion and sleep disturbance  Objective:  /60 (BP Location: Left arm, Patient Position: Sitting, Cuff Size: Standard)   Pulse 68   Temp 97 8 °F (36 6 °C)   Resp 15   Ht 5' 7" (1 702 m)   Wt 86 7 kg (191 lb 3 2 oz)   LMP 06/14/2018   BMI 29 95 kg/m²      Physical Exam   Constitutional: She appears well-developed and well-nourished  HENT:   Head: Normocephalic and atraumatic  Right Ear: Tympanic membrane normal    Left Ear: Tympanic membrane normal    Nose: Nose normal    Mouth/Throat: Oropharynx is clear and moist  No posterior oropharyngeal edema or posterior oropharyngeal erythema  Eyes: Conjunctivae are normal  Pupils are equal, round, and reactive to light  Right eye exhibits no discharge  Neck: Normal range of motion  Neck supple  No thyromegaly present  Cardiovascular: Normal rate, regular rhythm, S1 normal, S2 normal and normal heart sounds  PMI is not displaced  No murmur heard  Pulmonary/Chest: Effort normal and breath sounds normal  No accessory muscle usage  No apnea  No respiratory distress  She has no rhonchi  She has no rales  Abdominal: Soft   Normal appearance and bowel sounds are normal  She exhibits no shifting dullness  There is no hepatosplenomegaly  There is no tenderness  There is no rebound and no CVA tenderness  Musculoskeletal: Normal range of motion  She exhibits no edema  Right shoulder: She exhibits no tenderness, no bony tenderness and no swelling  Left shoulder: She exhibits no tenderness  Right wrist: She exhibits tenderness  Left wrist: She exhibits no tenderness  Right knee: She exhibits no swelling  Tenderness found  Lateral joint line tenderness noted  Left knee: No tenderness found  Left ankle: Tenderness  Medial malleolus tenderness found  Cervical back: She exhibits tenderness and spasm  Lymphadenopathy:     She has no cervical adenopathy  Neurological: She is alert  Skin: Skin is warm and intact  No rash noted  Psychiatric: She has a normal mood and affect  Her speech is normal    Nursing note and vitals reviewed  Recent Results (from the past 1008 hour(s))   POCT pregnancy, urine    Collection Time: 06/23/18  8:48 AM   Result Value Ref Range    EXT PREG TEST UR (Ref: Negative) HCG = neg (-)    ED Urine Macroscopic    Collection Time: 06/23/18  8:51 AM   Result Value Ref Range    Color, UA Yellow     Clarity, UA Clear     pH, UA 6 0 4 5 - 8 0    Leukocytes, UA Negative Negative    Nitrite, UA Negative Negative    Protein, UA Negative Negative mg/dl    Glucose, UA Negative Negative mg/dl    Ketones, UA Negative Negative mg/dl    Urobilinogen, UA 1 0 0 2, 1 0 E U /dl E U /dl    Bilirubin, UA Negative Negative    Blood, UA Negative Negative    Specific Gravity, UA 1 025 1 003 - 1 030   ]    Procedure: Mammo Screening Bilateral W 3d & Cad    Result Date: 5/11/2018  Narrative: Patient History: Family history of breast cancer at age 71 in maternal aunt, breast cancer at age 72 in maternal aunt, breast cancer at age 58 in maternal aunt, breast cancer at age 68 in mother  No Hormone Replacement Therapy Patient's BMI is 30 4  Reason for exam: screening, asymptomatic  Mammo Screening Bilateral W DBT and CAD: May 11, 2018 - Check In #: [de-identified] 2D/3D Procedure 3D views: Bilateral MLO view(s) were taken  2D views: Bilateral CC and XCCL view(s) were taken  Technologist: Nasrin Rocha, RT(R)(M) Prior study comparison: June 8, 2016, mammo screening bilateral W CAD, performed at [address] [place] [address]  May 30, 2014, mammogram   October 25, 2011, mammogram  The breast tissue is extremely dense, potentially limiting the sensitivity of mammography  Patient risk, included in this report, assists in determining the appropriate screening regimen (such as 3-D mammography or the inclusion of automated breast ultrasound or MRI)  3-D mammography may also remain indicated as screening  A combination of mediolateral oblique 3-D tomographic slices as well as standard two-dimensional orthogonal images were obtained  No dominant soft tissue mass, architectural distortion or suspicious calcifications are noted  The skin and nipple contours are within normal limits  IMPRESSION:    No evidence of malignancy  No significant changes when compared with prior studies  ACR BI-RADS® Assessments: BiRad:1 - Negative Recommendation: Routine screening mammogram of both breasts in 1 year  A reminder letter will be sent  The patient is scheduled in a reminder system for screening mammography  8-10% of cancers will be missed on mammography  Management of a palpable abnormality must be based on clinical grounds  Patients will be notified of their results via letter from our facility  Accredited by [employer] of Radiology and FDA  Transcription Location: [address] Signing Station: [id number] Risk Value(s): Tyrer-Cuzick 10 Year: 4 400%, Tyrer-Cuzick Lifetime: 30 100%, Myriad Table: 1 5%, NIURKA 5 Year: 0 7%, NCI Lifetime: 12 3%, MRS : Based on personal and/or family history, consideration of hereditary risk assessment may be warranted

## 2018-06-26 ENCOUNTER — APPOINTMENT (OUTPATIENT)
Dept: LAB | Facility: HOSPITAL | Age: 43
End: 2018-06-26
Payer: COMMERCIAL

## 2018-06-26 LAB
25(OH)D3 SERPL-MCNC: 13.8 NG/ML (ref 30–100)
ALBUMIN SERPL BCP-MCNC: 4 G/DL (ref 3.5–5)
ALP SERPL-CCNC: 81 U/L (ref 46–116)
ALT SERPL W P-5'-P-CCNC: 26 U/L (ref 12–78)
ANION GAP SERPL CALCULATED.3IONS-SCNC: 10 MMOL/L (ref 4–13)
AST SERPL W P-5'-P-CCNC: 19 U/L (ref 5–45)
BASOPHILS # BLD AUTO: 0.05 THOUSANDS/ΜL (ref 0–0.1)
BASOPHILS NFR BLD AUTO: 1 % (ref 0–1)
BILIRUB SERPL-MCNC: 0.77 MG/DL (ref 0.2–1)
BUN SERPL-MCNC: 13 MG/DL (ref 5–25)
CALCIUM SERPL-MCNC: 8.8 MG/DL (ref 8.3–10.1)
CHLORIDE SERPL-SCNC: 105 MMOL/L (ref 100–108)
CO2 SERPL-SCNC: 26 MMOL/L (ref 21–32)
CREAT SERPL-MCNC: 0.62 MG/DL (ref 0.6–1.3)
CRP SERPL QL: 63.8 MG/L
EOSINOPHIL # BLD AUTO: 0.22 THOUSAND/ΜL (ref 0–0.61)
EOSINOPHIL NFR BLD AUTO: 3 % (ref 0–6)
ERYTHROCYTE [DISTWIDTH] IN BLOOD BY AUTOMATED COUNT: 13.3 % (ref 11.6–15.1)
ERYTHROCYTE [SEDIMENTATION RATE] IN BLOOD: 22 MM/HOUR (ref 0–20)
EST. AVERAGE GLUCOSE BLD GHB EST-MCNC: 131 MG/DL
GFR SERPL CREATININE-BSD FRML MDRD: 112 ML/MIN/1.73SQ M
GLUCOSE P FAST SERPL-MCNC: 113 MG/DL (ref 65–99)
HBA1C MFR BLD: 6.2 % (ref 4.2–6.3)
HCT VFR BLD AUTO: 40.1 % (ref 34.8–46.1)
HGB BLD-MCNC: 13.3 G/DL (ref 11.5–15.4)
LYMPHOCYTES # BLD AUTO: 1.74 THOUSANDS/ΜL (ref 0.6–4.47)
LYMPHOCYTES NFR BLD AUTO: 20 % (ref 14–44)
MCH RBC QN AUTO: 27.8 PG (ref 26.8–34.3)
MCHC RBC AUTO-ENTMCNC: 33.2 G/DL (ref 31.4–37.4)
MCV RBC AUTO: 84 FL (ref 82–98)
MONOCYTES # BLD AUTO: 0.65 THOUSAND/ΜL (ref 0.17–1.22)
MONOCYTES NFR BLD AUTO: 7 % (ref 4–12)
NEUTROPHILS # BLD AUTO: 6.18 THOUSANDS/ΜL (ref 1.85–7.62)
NEUTS SEG NFR BLD AUTO: 70 % (ref 43–75)
NRBC BLD AUTO-RTO: 0 /100 WBCS
PLATELET # BLD AUTO: 273 THOUSANDS/UL (ref 149–390)
PMV BLD AUTO: 9.5 FL (ref 8.9–12.7)
POTASSIUM SERPL-SCNC: 3.7 MMOL/L (ref 3.5–5.3)
PROT SERPL-MCNC: 7.4 G/DL (ref 6.4–8.2)
RBC # BLD AUTO: 4.78 MILLION/UL (ref 3.81–5.12)
SODIUM SERPL-SCNC: 141 MMOL/L (ref 136–145)
TSH SERPL DL<=0.05 MIU/L-ACNC: 1.81 UIU/ML (ref 0.36–3.74)
WBC # BLD AUTO: 8.84 THOUSAND/UL (ref 4.31–10.16)

## 2018-06-26 PROCEDURE — 80053 COMPREHEN METABOLIC PANEL: CPT | Performed by: INTERNAL MEDICINE

## 2018-06-26 PROCEDURE — 85652 RBC SED RATE AUTOMATED: CPT | Performed by: INTERNAL MEDICINE

## 2018-06-26 PROCEDURE — 83036 HEMOGLOBIN GLYCOSYLATED A1C: CPT | Performed by: INTERNAL MEDICINE

## 2018-06-26 PROCEDURE — 86430 RHEUMATOID FACTOR TEST QUAL: CPT | Performed by: INTERNAL MEDICINE

## 2018-06-26 PROCEDURE — 36415 COLL VENOUS BLD VENIPUNCTURE: CPT | Performed by: INTERNAL MEDICINE

## 2018-06-26 PROCEDURE — 86140 C-REACTIVE PROTEIN: CPT | Performed by: INTERNAL MEDICINE

## 2018-06-26 PROCEDURE — 86038 ANTINUCLEAR ANTIBODIES: CPT | Performed by: INTERNAL MEDICINE

## 2018-06-26 PROCEDURE — 84443 ASSAY THYROID STIM HORMONE: CPT | Performed by: INTERNAL MEDICINE

## 2018-06-26 PROCEDURE — 85025 COMPLETE CBC W/AUTO DIFF WBC: CPT | Performed by: INTERNAL MEDICINE

## 2018-06-26 PROCEDURE — 86200 CCP ANTIBODY: CPT | Performed by: INTERNAL MEDICINE

## 2018-06-26 PROCEDURE — 82306 VITAMIN D 25 HYDROXY: CPT | Performed by: INTERNAL MEDICINE

## 2018-06-27 ENCOUNTER — TELEPHONE (OUTPATIENT)
Dept: INTERNAL MEDICINE CLINIC | Facility: CLINIC | Age: 43
End: 2018-06-27

## 2018-06-27 LAB
RHEUMATOID FACT SER QL LA: NEGATIVE
RYE IGE QN: NEGATIVE

## 2018-06-27 NOTE — TELEPHONE ENCOUNTER
----- Message from Hipolito Anderson MD sent at 6/27/2018 11:00 AM EDT -----  Her bloodwork shows active inflammation, rest of the blood work was all negative for any concerning features for rheumatoid or other types of arthritis  Her blood work shows consistency with what I had told her about acute arthritis  She also has pre diabetes, can discuss that further when she comes in  Also has a low vitamin D for which I will send in a prescription  Depending on how her pain is doing, let me know and then we can discuss what other medicine can be tried for her pain

## 2018-06-28 DIAGNOSIS — E55.9 VITAMIN D DEFICIENCY: Primary | ICD-10-CM

## 2018-06-28 DIAGNOSIS — M02.30: Primary | ICD-10-CM

## 2018-06-28 LAB — CCP IGA+IGG SERPL IA-ACNC: 4 UNITS (ref 0–19)

## 2018-06-28 RX ORDER — PREDNISONE 10 MG/1
20 TABLET ORAL DAILY
Qty: 30 TABLET | Refills: 0 | Status: SHIPPED | OUTPATIENT
Start: 2018-06-28 | End: 2018-08-02 | Stop reason: ALTCHOICE

## 2018-06-28 NOTE — TELEPHONE ENCOUNTER
I spoke with Rubens Miner and she is aware of her results  She stated that she is still having a lot of pain in her hands and would like to start medication for her pain

## 2018-07-03 ENCOUNTER — ULTRASOUND (OUTPATIENT)
Dept: OBGYN CLINIC | Facility: CLINIC | Age: 43
End: 2018-07-03
Payer: COMMERCIAL

## 2018-07-03 DIAGNOSIS — N83.292 COMPLEX CYST OF LEFT OVARY: Primary | ICD-10-CM

## 2018-07-03 DIAGNOSIS — R10.2 PELVIC PAIN IN FEMALE: ICD-10-CM

## 2018-07-03 PROCEDURE — 76830 TRANSVAGINAL US NON-OB: CPT

## 2018-07-03 NOTE — PROGRESS NOTES
AMB US Pelvic Non OB  Date/Time: 7/3/2018 9:08 AM  Performed by: Jesus Olmedo by: Lavelle Heart     Procedure details:     Indications: ovarian cysts and non-obstetric abdominal pain      Technique:  Transvaginal US, Non-OB  Uterine findings:     Diameter (mm):  79    Length (mm):  51    Width (mm):  51    Uterine adhesions: not identified      Adnexal mass: not identified      Polyps: not identified      Myomas: not identified      Endometrial stripe: identified      Endometrial hyperplasia: not identified      Endometrium thickness (mm):  12  Left ovary findings:     Left ovary:  Visualized    Diameter (mm):  31    Length (mm):  17    Width (mm):  24  Right ovary findings:     Right ovary:  Visualized    Diameter (mm):  24    Length (mm):  19    Width (mm):  19  Other findings:     Free pelvic fluid: not identified      Free peritoneal fluid: not identified    Post-Procedure Details:     Impression:  Resolution of Lt cyst    WNL    Tolerance:   Tolerated well, no immediate complications

## 2018-07-05 ENCOUNTER — OFFICE VISIT (OUTPATIENT)
Dept: INTERNAL MEDICINE CLINIC | Facility: CLINIC | Age: 43
End: 2018-07-05
Payer: COMMERCIAL

## 2018-07-05 VITALS
HEART RATE: 81 BPM | HEIGHT: 67 IN | DIASTOLIC BLOOD PRESSURE: 67 MMHG | BODY MASS INDEX: 29.88 KG/M2 | WEIGHT: 190.4 LBS | SYSTOLIC BLOOD PRESSURE: 104 MMHG | RESPIRATION RATE: 14 BRPM | TEMPERATURE: 97.6 F

## 2018-07-05 DIAGNOSIS — E55.9 VITAMIN D DEFICIENCY: ICD-10-CM

## 2018-07-05 DIAGNOSIS — M25.50 POLYARTHRALGIA: ICD-10-CM

## 2018-07-05 DIAGNOSIS — R73.03 PREDIABETES: Primary | ICD-10-CM

## 2018-07-05 PROCEDURE — 99214 OFFICE O/P EST MOD 30 MIN: CPT | Performed by: INTERNAL MEDICINE

## 2018-07-05 RX ORDER — CHOLECALCIFEROL (VITAMIN D3) 1250 MCG
CAPSULE ORAL
Refills: 0 | COMMUNITY
Start: 2018-06-28 | End: 2018-07-05 | Stop reason: SDUPTHER

## 2018-07-05 NOTE — PROGRESS NOTES
Assessment/Plan:    Polyarthralgia   Likely acute reactive arthritis  Did not respond well to naproxen, good response on prednisone  We discussed the hazards of long-term prednisone  Will wean off prednisone in the next 2 weeks  Follow-up in 4 weeks to discuss any recurrence of symptoms  Negative CCP, MAGDALENO    Vitamin D deficiency   Started supplements, continue those, recheck in 3 4 months  Prediabetes    Strong family history of diabetes, we discussed about dietary modifications to cut down simple carbs, total calorie intake and start a regular exercise regimen  A1c of 6 2,  We discussed that with lifestyle modifications, could prevent transition to diabetes  Continue to monitor closely as well  Diagnoses and all orders for this visit:    Prediabetes    Polyarthralgia    Vitamin D deficiency    Other orders  -     Discontinue: DECARA 22980 units capsule; TK ONE C PO  ONCE A WEEK FOR 12 DOSES          Subjective:   Chief Complaint   Patient presents with    Follow-up     1 week        Patient ID: Lolis Maxwell is a 41 y o  female  She comes in for follow-up of polyarthralgia, lumbar radiculopathy, vitamin-D deficiency    She notes that her joint pain has significantly improved since starting prednisone  No fevers or joint swelling  Overall feels okay  Started vitamin-D supplements as well  Back pain is stable as well        The following portions of the patient's history were reviewed and updated as appropriate: current medications, past medical history, past social history and past surgical history      PHQ-9 Depression Screening    PHQ-9:    Frequency of the following problems over the past two weeks:                Current Outpatient Prescriptions:     Cholecalciferol (VITAMIN D3) 42687 units TABS, Take 1 tablet (50,000 Units total) by mouth once a week for 12 doses, Disp: 12 tablet, Rfl: 0    clotrimazole-betamethasone (LOTRISONE) 1-0 05 % cream, Apply topically 2 (two) times a day, Disp: 30 g, Rfl: 0    cyclobenzaprine (FLEXERIL) 5 mg tablet, 1-2 tab at bedtime as needed for muscle spasm, Disp: 30 tablet, Rfl: 0    lidocaine (LMX) 4 % cream, Apply topically as needed for mild pain, Disp: 30 g, Rfl: 0    naproxen (NAPROSYN) 500 mg tablet, Take 1 tablet (500 mg total) by mouth 2 (two) times a day with meals, Disp: 30 tablet, Rfl: 0    norethindrone (MICRONOR) 0 35 MG tablet, Take 1 tablet (0 35 mg total) by mouth daily for 30 days, Disp: 30 tablet, Rfl: 2    predniSONE 10 mg tablet, Take 2 tablets (20 mg total) by mouth daily, Disp: 30 tablet, Rfl: 0    Review of Systems   Constitutional: Negative for fatigue, fever and unexpected weight change  HENT: Negative for ear pain, hearing loss and sore throat  Eyes: Negative for pain and discharge  Respiratory: Negative for cough, chest tightness and shortness of breath  Cardiovascular: Negative for chest pain and palpitations  Gastrointestinal: Negative for abdominal pain, blood in stool, constipation, diarrhea and nausea  Genitourinary: Negative for dysuria, frequency and hematuria  Musculoskeletal: Positive for arthralgias  Negative for joint swelling  Skin: Negative for rash  Allergic/Immunologic: Negative for immunocompromised state  Neurological: Negative for dizziness and headaches  Hematological: Negative for adenopathy  Psychiatric/Behavioral: Negative for confusion and sleep disturbance  Objective:  /67 (BP Location: Left arm, Patient Position: Sitting, Cuff Size: Standard)   Pulse 81   Temp 97 6 °F (36 4 °C)   Resp 14   Ht 5' 7" (1 702 m)   Wt 86 4 kg (190 lb 6 4 oz)   LMP 06/14/2018   BMI 29 82 kg/m²      Physical Exam   Constitutional: She appears well-developed and well-nourished  HENT:   Head: Normocephalic and atraumatic     Right Ear: Tympanic membrane normal    Left Ear: Tympanic membrane normal    Nose: Nose normal    Mouth/Throat: Oropharynx is clear and moist  No posterior oropharyngeal edema or posterior oropharyngeal erythema  Eyes: Conjunctivae are normal  Pupils are equal, round, and reactive to light  Right eye exhibits no discharge  Neck: Normal range of motion  Neck supple  No thyromegaly present  Cardiovascular: Normal rate, regular rhythm, S1 normal, S2 normal and normal heart sounds  PMI is not displaced  No murmur heard  Pulmonary/Chest: Effort normal and breath sounds normal  No accessory muscle usage  No apnea  No respiratory distress  She has no rhonchi  She has no rales  Abdominal: Soft  Normal appearance and bowel sounds are normal  She exhibits no shifting dullness  There is no hepatosplenomegaly  There is no tenderness  There is no rebound and no CVA tenderness  Musculoskeletal: Normal range of motion  She exhibits no edema or tenderness  Lymphadenopathy:     She has no cervical adenopathy  Neurological: She is alert  Skin: Skin is warm and intact  No rash noted  Psychiatric: She has a normal mood and affect  Her speech is normal    Nursing note and vitals reviewed          Recent Results (from the past 1008 hour(s))   POCT pregnancy, urine    Collection Time: 06/23/18  8:48 AM   Result Value Ref Range    EXT PREG TEST UR (Ref: Negative) HCG = neg (-)    ED Urine Macroscopic    Collection Time: 06/23/18  8:51 AM   Result Value Ref Range    Color, UA Yellow     Clarity, UA Clear     pH, UA 6 0 4 5 - 8 0    Leukocytes, UA Negative Negative    Nitrite, UA Negative Negative    Protein, UA Negative Negative mg/dl    Glucose, UA Negative Negative mg/dl    Ketones, UA Negative Negative mg/dl    Urobilinogen, UA 1 0 0 2, 1 0 E U /dl E U /dl    Bilirubin, UA Negative Negative    Blood, UA Negative Negative    Specific Gravity, UA 1 025 1 003 - 1 030   CBC and differential    Collection Time: 06/26/18  8:32 AM   Result Value Ref Range    WBC 8 84 4 31 - 10 16 Thousand/uL    RBC 4 78 3 81 - 5 12 Million/uL    Hemoglobin 13 3 11 5 - 15 4 g/dL    Hematocrit 40 1 34 8 - 46 1 %    MCV 84 82 - 98 fL    MCH 27 8 26 8 - 34 3 pg    MCHC 33 2 31 4 - 37 4 g/dL    RDW 13 3 11 6 - 15 1 %    MPV 9 5 8 9 - 12 7 fL    Platelets 032 119 - 464 Thousands/uL    nRBC 0 /100 WBCs    Neutrophils Relative 70 43 - 75 %    Lymphocytes Relative 20 14 - 44 %    Monocytes Relative 7 4 - 12 %    Eosinophils Relative 3 0 - 6 %    Basophils Relative 1 0 - 1 %    Neutrophils Absolute 6 18 1 85 - 7 62 Thousands/µL    Lymphocytes Absolute 1 74 0 60 - 4 47 Thousands/µL    Monocytes Absolute 0 65 0 17 - 1 22 Thousand/µL    Eosinophils Absolute 0 22 0 00 - 0 61 Thousand/µL    Basophils Absolute 0 05 0 00 - 0 10 Thousands/µL   Comprehensive metabolic panel    Collection Time: 06/26/18  8:32 AM   Result Value Ref Range    Sodium 141 136 - 145 mmol/L    Potassium 3 7 3 5 - 5 3 mmol/L    Chloride 105 100 - 108 mmol/L    CO2 26 21 - 32 mmol/L    Anion Gap 10 4 - 13 mmol/L    BUN 13 5 - 25 mg/dL    Creatinine 0 62 0 60 - 1 30 mg/dL    Glucose, Fasting 113 (H) 65 - 99 mg/dL    Calcium 8 8 8 3 - 10 1 mg/dL    AST 19 5 - 45 U/L    ALT 26 12 - 78 U/L    Alkaline Phosphatase 81 46 - 116 U/L    Total Protein 7 4 6 4 - 8 2 g/dL    Albumin 4 0 3 5 - 5 0 g/dL    Total Bilirubin 0 77 0 20 - 1 00 mg/dL    eGFR 112 ml/min/1 73sq m   TSH, 3rd generation with Free T4 reflex    Collection Time: 06/26/18  8:32 AM   Result Value Ref Range    TSH 3RD GENERATON 1 813 0 358 - 3 740 uIU/mL   Sedimentation rate, automated    Collection Time: 06/26/18  8:32 AM   Result Value Ref Range    Sed Rate 22 (H) 0 - 20 mm/hour   C-reactive protein    Collection Time: 06/26/18  8:32 AM   Result Value Ref Range    CRP 63 8 (H) <3 0 mg/L   Vitamin D 25 hydroxy    Collection Time: 06/26/18  8:32 AM   Result Value Ref Range    Vit D, 25-Hydroxy 13 8 (L) 30 0 - 100 0 ng/mL   MAGDALENO Screen w/ Reflex to Titer/Pattern    Collection Time: 06/26/18  8:32 AM   Result Value Ref Range    MAGDALENO Negative Negative   RF Screen w/ Reflex to Titer    Collection Time: 06/26/18  8:32 AM   Result Value Ref Range    Rheumatoid Factor Negative Negative   Cyclic citrul peptide antibody, IgG    Collection Time: 06/26/18  8:32 AM   Result Value Ref Range    Cyclic Citrullin Peptide Ab 4 0 - 19 units   Hemoglobin A1C    Collection Time: 06/26/18  8:32 AM   Result Value Ref Range    Hemoglobin A1C 6 2 4 2 - 6 3 %     mg/dl   ]    Procedure: Mammo Screening Bilateral W 3d & Cad    Result Date: 5/11/2018  Narrative: Patient History: Family history of breast cancer at age 71 in maternal aunt, breast cancer at age 72 in maternal aunt, breast cancer at age 58 in maternal aunt, breast cancer at age 68 in mother  No Hormone Replacement Therapy Patient's BMI is 30 4  Reason for exam: screening, asymptomatic  Mammo Screening Bilateral W DBT and CAD: May 11, 2018 - Check In #: [de-identified] 2D/3D Procedure 3D views: Bilateral MLO view(s) were taken  2D views: Bilateral CC and XCCL view(s) were taken  Technologist: RT Fortino(R)(M) Prior study comparison: June 8, 2016, mammo screening bilateral W CAD, performed at [facility]  May 30, 2014, mammogram   October 25, 2011, mammogram  The breast tissue is extremely dense, potentially limiting the sensitivity of mammography  Patient risk, included in this report, assists in determining the appropriate screening regimen (such as 3-D mammography or the inclusion of automated breast ultrasound or MRI)  3-D mammography may also remain indicated as screening  A combination of mediolateral oblique 3-D tomographic slices as well as standard two-dimensional orthogonal images were obtained  No dominant soft tissue mass, architectural distortion or suspicious calcifications are noted  The skin and nipple contours are within normal limits  IMPRESSION:    No evidence of malignancy  No significant changes when compared with prior studies   ACR BI-RADS® Assessments: BiRad:1 - Negative Recommendation: Routine screening mammogram of both breasts in 1 year  A reminder letter will be sent  The patient is scheduled in a reminder system for screening mammography  8-10% of cancers will be missed on mammography  Management of a palpable abnormality must be based on clinical grounds  Patients will be notified of their results via letter from our facility  Accredited by [employer] of [employer] and FDA  Transcription Location: [id number] Signing Station: [id number] Risk Value(s): Tyrer-Cuzick 10 Year: 4 400%, Tyrer-Cuzick Lifetime: 30 100%, Myriad Table: 1 5%, NIURKA 5 Year: 0 7%, NCI Lifetime: 12 3%, MRS : Based on personal and/or family history, consideration of hereditary risk assessment may be warranted

## 2018-07-05 NOTE — ASSESSMENT & PLAN NOTE
Strong family history of diabetes, we discussed about dietary modifications to cut down simple carbs, total calorie intake and start a regular exercise regimen  A1c of 6 2,  We discussed that with lifestyle modifications, could prevent transition to diabetes  Continue to monitor closely as well

## 2018-07-05 NOTE — ASSESSMENT & PLAN NOTE
Likely acute reactive arthritis  Did not respond well to naproxen, good response on prednisone  We discussed the hazards of long-term prednisone  Will wean off prednisone in the next 2 weeks  Follow-up in 4 weeks to discuss any recurrence of symptoms    Negative CCP, MAGDALENO

## 2018-07-05 NOTE — PATIENT INSTRUCTIONS
Take 10mg (1tab prednisone)daily for 5 days, then 5mg (1/2 tab) daily for 5 days then 5mg (1/2 tab) every other day for 5 days then stop

## 2018-07-11 ENCOUNTER — OFFICE VISIT (OUTPATIENT)
Dept: OBGYN CLINIC | Facility: CLINIC | Age: 43
End: 2018-07-11
Payer: COMMERCIAL

## 2018-07-11 VITALS
DIASTOLIC BLOOD PRESSURE: 70 MMHG | WEIGHT: 190.4 LBS | SYSTOLIC BLOOD PRESSURE: 114 MMHG | HEIGHT: 67 IN | BODY MASS INDEX: 29.88 KG/M2

## 2018-07-11 DIAGNOSIS — N83.202 CYST OF LEFT OVARY: ICD-10-CM

## 2018-07-11 DIAGNOSIS — B35.4 TINEA CORPORIS: Primary | ICD-10-CM

## 2018-07-11 DIAGNOSIS — N95.1 MENOPAUSAL SYNDROME (HOT FLASHES): ICD-10-CM

## 2018-07-11 PROCEDURE — 99213 OFFICE O/P EST LOW 20 MIN: CPT | Performed by: OBSTETRICS & GYNECOLOGY

## 2018-07-11 RX ORDER — CLOTRIMAZOLE AND BETAMETHASONE DIPROPIONATE 10; .64 MG/G; MG/G
CREAM TOPICAL 2 TIMES DAILY
Qty: 30 G | Refills: 2 | Status: SHIPPED | OUTPATIENT
Start: 2018-07-11 | End: 2018-08-02 | Stop reason: CLARIF

## 2018-07-11 RX ORDER — ACETAMINOPHEN AND CODEINE PHOSPHATE 120; 12 MG/5ML; MG/5ML
1 SOLUTION ORAL DAILY
Qty: 30 TABLET | Refills: 6 | Status: SHIPPED | OUTPATIENT
Start: 2018-07-11 | End: 2019-02-07 | Stop reason: ALTCHOICE

## 2018-07-11 NOTE — PROGRESS NOTES
Assessment/Plan:    Diagnoses and all orders for this visit:    Tinea corporis  -     clotrimazole-betamethasone (LOTRISONE) 1-0 05 % cream; Apply topically 2 (two) times a day    Cyst of left ovary    Menopausal syndrome (hot flashes)  -     norethindrone (MICRONOR) 0 35 MG tablet; Take 1 tablet (0 35 mg total) by mouth daily for 30 days        Subjective:  No acute distress     Patient ID: Alexander Connor is a 41 y o  female  HPI   33-year-old female here for follow-up regarding recent pelvic ultrasound  Patient was here in May for annual exam Pap pelvic and breast exam   Pap was normal her exam was otherwise normal   She is complaining of some pelvic pain symptoms and secondary oligomenorrhea  Screening laboratory studies were all normal pelvic ultrasound revealed a complex left ovarian cyst   She was started on norethindrone 0 35 mg daily to assistant resolution of the cyst as well as to aid in her menopausal symptoms with hot flashes and vaginal dryness  Patient states the hot flashes have improved tremendously well being on norethindrone and she would like to continue same  We did renew her medication  She does have occasional skin rash which does improve Lotrisone cream   We will renew this well  Follow-up ultrasound reveals the cyst has resolved completely otherwise looks normal endometrial thickness was at 10 mm  Patient gets a regular light period every month which last about 3 days  Will continue the norethindrone as previously prescribed follow-up in 6 months and p r n       Review of Systems   All other systems reviewed and are negative  Objective:  No acute distress     Physical Exam   Constitutional: She is oriented to person, place, and time  She appears well-developed and well-nourished  HENT:   Head: Normocephalic and atraumatic  Eyes: Pupils are equal, round, and reactive to light  Neck: Normal range of motion     Genitourinary:   Genitourinary Comments: Pelvic exam deferred today   Musculoskeletal: Normal range of motion  Neurological: She is alert and oriented to person, place, and time  Skin: Skin is warm and dry  Psychiatric: She has a normal mood and affect   Her behavior is normal  Judgment and thought content normal

## 2018-08-02 ENCOUNTER — OFFICE VISIT (OUTPATIENT)
Dept: INTERNAL MEDICINE CLINIC | Facility: CLINIC | Age: 43
End: 2018-08-02
Payer: COMMERCIAL

## 2018-08-02 VITALS
WEIGHT: 192 LBS | RESPIRATION RATE: 16 BRPM | HEART RATE: 71 BPM | BODY MASS INDEX: 30.13 KG/M2 | DIASTOLIC BLOOD PRESSURE: 68 MMHG | HEIGHT: 67 IN | TEMPERATURE: 97.7 F | SYSTOLIC BLOOD PRESSURE: 106 MMHG

## 2018-08-02 DIAGNOSIS — E55.9 VITAMIN D DEFICIENCY: ICD-10-CM

## 2018-08-02 DIAGNOSIS — R73.03 PREDIABETES: ICD-10-CM

## 2018-08-02 DIAGNOSIS — M25.50 POLYARTHRALGIA: Primary | ICD-10-CM

## 2018-08-02 DIAGNOSIS — M54.16 LUMBAR RADICULOPATHY: ICD-10-CM

## 2018-08-02 PROCEDURE — 3008F BODY MASS INDEX DOCD: CPT | Performed by: INTERNAL MEDICINE

## 2018-08-02 PROCEDURE — 1036F TOBACCO NON-USER: CPT | Performed by: INTERNAL MEDICINE

## 2018-08-02 PROCEDURE — 99214 OFFICE O/P EST MOD 30 MIN: CPT | Performed by: INTERNAL MEDICINE

## 2018-08-02 NOTE — ASSESSMENT & PLAN NOTE
Continue monitoring, recheck sed rate in 6 months  I would expected to normalize in the absence of any chronic inflammatory condition

## 2018-08-02 NOTE — PROGRESS NOTES
Assessment/Plan:    Polyarthralgia  Continue monitoring, recheck sed rate in 6 months  I would expected to normalize in the absence of any chronic inflammatory condition  Vitamin D deficiency  Continue supplements, recheck in 6 months    Prediabetes  Continue dietary modifications and recheck before next appointment  Try to lose weight       Diagnoses and all orders for this visit:    Polyarthralgia  -     Basic metabolic panel; Future  -     Sedimentation rate, automated; Future  -     Vitamin D 25 hydroxy; Future  -     CBC and differential; Future    Prediabetes  -     Basic metabolic panel; Future  -     Sedimentation rate, automated; Future  -     Hemoglobin A1C; Future  -     CBC and differential; Future    Vitamin D deficiency  -     Basic metabolic panel; Future  -     Sedimentation rate, automated; Future  -     Vitamin D 25 hydroxy; Future  -     CBC and differential; Future    Lumbar radiculopathy  -     Basic metabolic panel; Future  -     Sedimentation rate, automated; Future  -     CBC and differential; Future          Subjective:   Chief Complaint   Patient presents with    Follow-up     1 month        Patient ID: Sander Islas is a 41 y o  female  She comes in for follow-up of polyarthralgia, back pain, vitamin-D deficiency, prediabetes    She taper down the prednisone as instructed and has not had any prednisone for a week  She notes that her joint pain still continued to feel better and back pain is stable as well taking vitamin-D supplements  Trying to eat healthy        The following portions of the patient's history were reviewed and updated as appropriate: current medications, past medical history, past social history and past surgical history      PHQ-9 Depression Screening    PHQ-9:    Frequency of the following problems over the past two weeks:                Current Outpatient Prescriptions:     Cholecalciferol (VITAMIN D3) 47788 units TABS, Take 1 tablet (50,000 Units total) by mouth once a week for 12 doses, Disp: 12 tablet, Rfl: 0    naproxen (NAPROSYN) 500 mg tablet, Take 1 tablet (500 mg total) by mouth 2 (two) times a day with meals, Disp: 30 tablet, Rfl: 0    clotrimazole-betamethasone (LOTRISONE) 1-0 05 % cream, Apply topically 2 (two) times a day, Disp: 30 g, Rfl: 0    cyclobenzaprine (FLEXERIL) 5 mg tablet, 1-2 tab at bedtime as needed for muscle spasm, Disp: 30 tablet, Rfl: 0    lidocaine (LMX) 4 % cream, Apply topically as needed for mild pain, Disp: 30 g, Rfl: 0    norethindrone (MICRONOR) 0 35 MG tablet, Take 1 tablet (0 35 mg total) by mouth daily for 30 days, Disp: 30 tablet, Rfl: 6    Review of Systems   Constitutional: Negative for fatigue, fever and unexpected weight change  HENT: Negative for ear pain, hearing loss and sore throat  Eyes: Negative for pain and discharge  Respiratory: Negative for cough, chest tightness and shortness of breath  Cardiovascular: Negative for chest pain and palpitations  Gastrointestinal: Negative for abdominal pain, blood in stool, constipation, diarrhea and nausea  Genitourinary: Negative for dysuria, frequency and hematuria  Musculoskeletal: Negative for arthralgias and joint swelling  Skin: Negative for rash  Allergic/Immunologic: Negative for immunocompromised state  Neurological: Negative for dizziness and headaches  Hematological: Negative for adenopathy  Psychiatric/Behavioral: Negative for confusion and sleep disturbance  Objective:  /68 (BP Location: Left arm, Patient Position: Sitting, Cuff Size: Standard)   Pulse 71   Temp 97 7 °F (36 5 °C)   Resp 16   Ht 5' 7" (1 702 m)   Wt 87 1 kg (192 lb)   BMI 30 07 kg/m²      Physical Exam   Constitutional: She appears well-developed and well-nourished  HENT:   Head: Normocephalic and atraumatic     Right Ear: Tympanic membrane normal    Left Ear: Tympanic membrane normal    Nose: Nose normal    Mouth/Throat: Oropharynx is clear and moist  No posterior oropharyngeal edema or posterior oropharyngeal erythema  Eyes: Conjunctivae are normal  Pupils are equal, round, and reactive to light  Right eye exhibits no discharge  Neck: Normal range of motion  Neck supple  No thyromegaly present  Cardiovascular: Normal rate, regular rhythm, S1 normal, S2 normal and normal heart sounds  PMI is not displaced  No murmur heard  Pulmonary/Chest: Effort normal and breath sounds normal  No accessory muscle usage  No apnea  No respiratory distress  She has no rhonchi  She has no rales  Abdominal: Soft  Normal appearance and bowel sounds are normal  She exhibits no shifting dullness  There is no hepatosplenomegaly  There is no tenderness  There is no rebound and no CVA tenderness  Musculoskeletal: Normal range of motion  She exhibits no edema or tenderness  Lymphadenopathy:     She has no cervical adenopathy  Neurological: She is alert  Skin: Skin is warm and intact  No rash noted  Psychiatric: She has a normal mood and affect  Her speech is normal    Nursing note and vitals reviewed          Recent Results (from the past 1008 hour(s))   POCT pregnancy, urine    Collection Time: 06/23/18  8:48 AM   Result Value Ref Range    EXT PREG TEST UR (Ref: Negative) HCG = neg (-)    ED Urine Macroscopic    Collection Time: 06/23/18  8:51 AM   Result Value Ref Range    Color, UA Yellow     Clarity, UA Clear     pH, UA 6 0 4 5 - 8 0    Leukocytes, UA Negative Negative    Nitrite, UA Negative Negative    Protein, UA Negative Negative mg/dl    Glucose, UA Negative Negative mg/dl    Ketones, UA Negative Negative mg/dl    Urobilinogen, UA 1 0 0 2, 1 0 E U /dl E U /dl    Bilirubin, UA Negative Negative    Blood, UA Negative Negative    Specific Gravity, UA 1 025 1 003 - 1 030   CBC and differential    Collection Time: 06/26/18  8:32 AM   Result Value Ref Range    WBC 8 84 4 31 - 10 16 Thousand/uL    RBC 4 78 3 81 - 5 12 Million/uL    Hemoglobin 13 3 11 5 - 15 4 g/dL    Hematocrit 40 1 34 8 - 46 1 %    MCV 84 82 - 98 fL    MCH 27 8 26 8 - 34 3 pg    MCHC 33 2 31 4 - 37 4 g/dL    RDW 13 3 11 6 - 15 1 %    MPV 9 5 8 9 - 12 7 fL    Platelets 038 256 - 737 Thousands/uL    nRBC 0 /100 WBCs    Neutrophils Relative 70 43 - 75 %    Lymphocytes Relative 20 14 - 44 %    Monocytes Relative 7 4 - 12 %    Eosinophils Relative 3 0 - 6 %    Basophils Relative 1 0 - 1 %    Neutrophils Absolute 6 18 1 85 - 7 62 Thousands/µL    Lymphocytes Absolute 1 74 0 60 - 4 47 Thousands/µL    Monocytes Absolute 0 65 0 17 - 1 22 Thousand/µL    Eosinophils Absolute 0 22 0 00 - 0 61 Thousand/µL    Basophils Absolute 0 05 0 00 - 0 10 Thousands/µL   Comprehensive metabolic panel    Collection Time: 06/26/18  8:32 AM   Result Value Ref Range    Sodium 141 136 - 145 mmol/L    Potassium 3 7 3 5 - 5 3 mmol/L    Chloride 105 100 - 108 mmol/L    CO2 26 21 - 32 mmol/L    Anion Gap 10 4 - 13 mmol/L    BUN 13 5 - 25 mg/dL    Creatinine 0 62 0 60 - 1 30 mg/dL    Glucose, Fasting 113 (H) 65 - 99 mg/dL    Calcium 8 8 8 3 - 10 1 mg/dL    AST 19 5 - 45 U/L    ALT 26 12 - 78 U/L    Alkaline Phosphatase 81 46 - 116 U/L    Total Protein 7 4 6 4 - 8 2 g/dL    Albumin 4 0 3 5 - 5 0 g/dL    Total Bilirubin 0 77 0 20 - 1 00 mg/dL    eGFR 112 ml/min/1 73sq m   TSH, 3rd generation with Free T4 reflex    Collection Time: 06/26/18  8:32 AM   Result Value Ref Range    TSH 3RD GENERATON 1 813 0 358 - 3 740 uIU/mL   Sedimentation rate, automated    Collection Time: 06/26/18  8:32 AM   Result Value Ref Range    Sed Rate 22 (H) 0 - 20 mm/hour   C-reactive protein    Collection Time: 06/26/18  8:32 AM   Result Value Ref Range    CRP 63 8 (H) <3 0 mg/L   Vitamin D 25 hydroxy    Collection Time: 06/26/18  8:32 AM   Result Value Ref Range    Vit D, 25-Hydroxy 13 8 (L) 30 0 - 100 0 ng/mL   MAGDALENO Screen w/ Reflex to Titer/Pattern    Collection Time: 06/26/18  8:32 AM   Result Value Ref Range    MAGDALENO Negative Negative   RF Screen w/ Reflex to Titer    Collection Time: 06/26/18  8:32 AM   Result Value Ref Range    Rheumatoid Factor Negative Negative   Cyclic citrul peptide antibody, IgG    Collection Time: 06/26/18  8:32 AM   Result Value Ref Range    Cyclic Citrullin Peptide Ab 4 0 - 19 units   Hemoglobin A1C    Collection Time: 06/26/18  8:32 AM   Result Value Ref Range    Hemoglobin A1C 6 2 4 2 - 6 3 %     mg/dl   ]    No results found

## 2019-02-05 ENCOUNTER — APPOINTMENT (OUTPATIENT)
Dept: LAB | Facility: HOSPITAL | Age: 44
End: 2019-02-05
Payer: COMMERCIAL

## 2019-02-05 DIAGNOSIS — E55.9 VITAMIN D DEFICIENCY: ICD-10-CM

## 2019-02-05 DIAGNOSIS — M54.16 LUMBAR RADICULOPATHY: ICD-10-CM

## 2019-02-05 DIAGNOSIS — M25.50 POLYARTHRALGIA: ICD-10-CM

## 2019-02-05 DIAGNOSIS — R73.03 PREDIABETES: ICD-10-CM

## 2019-02-05 LAB
25(OH)D3 SERPL-MCNC: 31.2 NG/ML (ref 30–100)
ANION GAP SERPL CALCULATED.3IONS-SCNC: 5 MMOL/L (ref 4–13)
BASOPHILS # BLD AUTO: 0.02 THOUSANDS/ΜL (ref 0–0.1)
BASOPHILS NFR BLD AUTO: 0 % (ref 0–1)
BUN SERPL-MCNC: 13 MG/DL (ref 5–25)
CALCIUM SERPL-MCNC: 8.8 MG/DL (ref 8.3–10.1)
CHLORIDE SERPL-SCNC: 105 MMOL/L (ref 100–108)
CO2 SERPL-SCNC: 29 MMOL/L (ref 21–32)
CREAT SERPL-MCNC: 0.65 MG/DL (ref 0.6–1.3)
EOSINOPHIL # BLD AUTO: 0.36 THOUSAND/ΜL (ref 0–0.61)
EOSINOPHIL NFR BLD AUTO: 5 % (ref 0–6)
ERYTHROCYTE [DISTWIDTH] IN BLOOD BY AUTOMATED COUNT: 12.8 % (ref 11.6–15.1)
ERYTHROCYTE [SEDIMENTATION RATE] IN BLOOD: 2 MM/HOUR (ref 0–20)
EST. AVERAGE GLUCOSE BLD GHB EST-MCNC: 117 MG/DL
GFR SERPL CREATININE-BSD FRML MDRD: 111 ML/MIN/1.73SQ M
GLUCOSE P FAST SERPL-MCNC: 101 MG/DL (ref 65–99)
HBA1C MFR BLD: 5.7 % (ref 4.2–6.3)
HCT VFR BLD AUTO: 42.1 % (ref 34.8–46.1)
HGB BLD-MCNC: 13.4 G/DL (ref 11.5–15.4)
IMM GRANULOCYTES # BLD AUTO: 0.02 THOUSAND/UL (ref 0–0.2)
IMM GRANULOCYTES NFR BLD AUTO: 0 % (ref 0–2)
LYMPHOCYTES # BLD AUTO: 2.27 THOUSANDS/ΜL (ref 0.6–4.47)
LYMPHOCYTES NFR BLD AUTO: 34 % (ref 14–44)
MCH RBC QN AUTO: 28.1 PG (ref 26.8–34.3)
MCHC RBC AUTO-ENTMCNC: 31.8 G/DL (ref 31.4–37.4)
MCV RBC AUTO: 88 FL (ref 82–98)
MONOCYTES # BLD AUTO: 0.4 THOUSAND/ΜL (ref 0.17–1.22)
MONOCYTES NFR BLD AUTO: 6 % (ref 4–12)
NEUTROPHILS # BLD AUTO: 3.65 THOUSANDS/ΜL (ref 1.85–7.62)
NEUTS SEG NFR BLD AUTO: 55 % (ref 43–75)
NRBC BLD AUTO-RTO: 0 /100 WBCS
PLATELET # BLD AUTO: 231 THOUSANDS/UL (ref 149–390)
PMV BLD AUTO: 10.2 FL (ref 8.9–12.7)
POTASSIUM SERPL-SCNC: 3.5 MMOL/L (ref 3.5–5.3)
RBC # BLD AUTO: 4.77 MILLION/UL (ref 3.81–5.12)
SODIUM SERPL-SCNC: 139 MMOL/L (ref 136–145)
WBC # BLD AUTO: 6.72 THOUSAND/UL (ref 4.31–10.16)

## 2019-02-05 PROCEDURE — 85652 RBC SED RATE AUTOMATED: CPT

## 2019-02-05 PROCEDURE — 85025 COMPLETE CBC W/AUTO DIFF WBC: CPT

## 2019-02-05 PROCEDURE — 83036 HEMOGLOBIN GLYCOSYLATED A1C: CPT

## 2019-02-05 PROCEDURE — 82306 VITAMIN D 25 HYDROXY: CPT

## 2019-02-05 PROCEDURE — 80048 BASIC METABOLIC PNL TOTAL CA: CPT

## 2019-02-05 PROCEDURE — 36415 COLL VENOUS BLD VENIPUNCTURE: CPT

## 2019-02-07 ENCOUNTER — OFFICE VISIT (OUTPATIENT)
Dept: INTERNAL MEDICINE CLINIC | Facility: CLINIC | Age: 44
End: 2019-02-07
Payer: COMMERCIAL

## 2019-02-07 VITALS
BODY MASS INDEX: 30.92 KG/M2 | WEIGHT: 197 LBS | RESPIRATION RATE: 15 BRPM | HEART RATE: 76 BPM | HEIGHT: 67 IN | DIASTOLIC BLOOD PRESSURE: 64 MMHG | SYSTOLIC BLOOD PRESSURE: 100 MMHG | TEMPERATURE: 97.1 F

## 2019-02-07 DIAGNOSIS — M25.50 POLYARTHRALGIA: ICD-10-CM

## 2019-02-07 DIAGNOSIS — E55.9 VITAMIN D DEFICIENCY: ICD-10-CM

## 2019-02-07 DIAGNOSIS — R73.03 PREDIABETES: ICD-10-CM

## 2019-02-07 DIAGNOSIS — M54.16 LUMBAR RADICULOPATHY: ICD-10-CM

## 2019-02-07 DIAGNOSIS — Z00.00 PHYSICAL EXAM, ANNUAL: Primary | ICD-10-CM

## 2019-02-07 PROCEDURE — 1036F TOBACCO NON-USER: CPT | Performed by: INTERNAL MEDICINE

## 2019-02-07 PROCEDURE — 99214 OFFICE O/P EST MOD 30 MIN: CPT | Performed by: INTERNAL MEDICINE

## 2019-02-07 PROCEDURE — 3008F BODY MASS INDEX DOCD: CPT | Performed by: INTERNAL MEDICINE

## 2019-02-08 NOTE — PROGRESS NOTES
Assessment/Plan     Healthy female exam      1  Back pain, joint pain improved  2  Patient Counseling:  --Nutrition: Stressed importance of moderation in sodium/caffeine intake, saturated fat and cholesterol, caloric balance, sufficient intake of fresh fruits, vegetables, fiber, calcium, iron, and 1 mg of folate supplement per day (for females capable of pregnancy)  --Exercise: Stressed the importance of regular exercise  --Immunizations reviewed and updated  --Metabolic screening was reviewed and updated  A1c improved, discussed importance of continued life style modifications  --Cancer screening was reviewed and updated  Pap smear in 2017, mammo 5/18    3  Discussed the patient's BMI with her  The BMI is above average; BMI management plan is completed  4  Follow up in one year  Subjective     Thais Friday is a 41 y o  female and is here for a comprehensive physical exam  The patient reports no problems  Trying to eat healthy, not exercising  The following portions of the patient's history were reviewed and updated as appropriate: current medications, past medical history, past social history and past surgical history  Review of Systems  Review of Systems   Constitutional: Negative for fatigue, fever and unexpected weight change  HENT: Negative for ear pain, hearing loss and sore throat  Eyes: Negative for pain and discharge  Respiratory: Negative for cough, chest tightness and shortness of breath  Cardiovascular: Negative for chest pain and palpitations  Gastrointestinal: Negative for abdominal pain, blood in stool, constipation, diarrhea and nausea  Genitourinary: Negative for dysuria, frequency and hematuria  Musculoskeletal: Negative for arthralgias and joint swelling  Skin: Negative for rash  Allergic/Immunologic: Negative for immunocompromised state  Neurological: Negative for dizziness and headaches  Hematological: Negative for adenopathy  Psychiatric/Behavioral: Negative for confusion and sleep disturbance  /64 (BP Location: Left arm, Patient Position: Sitting, Cuff Size: Standard)   Pulse 76   Temp (!) 97 1 °F (36 2 °C) (Tympanic)   Resp 15   Ht 5' 7" (1 702 m)   Wt 89 4 kg (197 lb)   BMI 30 85 kg/m²      Physical Exam   Constitutional: She appears well-developed and well-nourished  HENT:   Head: Normocephalic and atraumatic  Right Ear: Tympanic membrane normal    Left Ear: Tympanic membrane normal    Nose: Nose normal    Mouth/Throat: Oropharynx is clear and moist  No posterior oropharyngeal edema or posterior oropharyngeal erythema  Eyes: Pupils are equal, round, and reactive to light  Conjunctivae are normal  Right eye exhibits no discharge  Neck: Normal range of motion  Neck supple  No thyromegaly present  Cardiovascular: Normal rate, regular rhythm, S1 normal, S2 normal and normal heart sounds  PMI is not displaced  No murmur heard  Pulmonary/Chest: Effort normal and breath sounds normal  No accessory muscle usage  No apnea  No respiratory distress  She has no rhonchi  She has no rales  Abdominal: Soft  Normal appearance and bowel sounds are normal  She exhibits no shifting dullness  There is no hepatosplenomegaly  There is no tenderness  There is no rebound and no CVA tenderness  Musculoskeletal: Normal range of motion  She exhibits no edema or tenderness  Lymphadenopathy:     She has no cervical adenopathy  Neurological: She is alert  Skin: Skin is warm and intact  No rash noted  Psychiatric: She has a normal mood and affect  Her speech is normal    Nursing note and vitals reviewed  BMI Counseling: Body mass index is 30 85 kg/m²  Discussed the patient's BMI with her  The BMI is above average  BMI counseling and education was provided to the patient  Nutrition recommendations include 3-5 servings of fruits/vegetables daily   Exercise recommendations include exercising 3-5 times per week

## 2019-05-03 ENCOUNTER — TELEPHONE (OUTPATIENT)
Dept: OBGYN CLINIC | Facility: CLINIC | Age: 44
End: 2019-05-03

## 2019-05-03 DIAGNOSIS — Z92.89 HISTORY OF MAMMOGRAM: Primary | ICD-10-CM

## 2019-05-06 ENCOUNTER — TELEPHONE (OUTPATIENT)
Dept: INTERNAL MEDICINE CLINIC | Facility: CLINIC | Age: 44
End: 2019-05-06

## 2019-05-16 ENCOUNTER — HOSPITAL ENCOUNTER (OUTPATIENT)
Dept: RADIOLOGY | Age: 44
Discharge: HOME | End: 2019-05-16
Payer: COMMERCIAL

## 2019-05-16 VITALS — BODY MASS INDEX: 30.13 KG/M2 | WEIGHT: 192 LBS | HEIGHT: 67 IN

## 2019-05-16 DIAGNOSIS — Z12.31 ENCOUNTER FOR SCREENING MAMMOGRAM FOR MALIGNANT NEOPLASM OF BREAST: ICD-10-CM

## 2019-05-16 PROCEDURE — 77063 BREAST TOMOSYNTHESIS BI: CPT

## 2019-05-16 PROCEDURE — 77067 SCR MAMMO BI INCL CAD: CPT

## 2020-05-30 ENCOUNTER — HOSPITAL ENCOUNTER (OUTPATIENT)
Dept: RADIOLOGY | Age: 45
Discharge: HOME | End: 2020-05-30
Payer: COMMERCIAL

## 2020-05-30 VITALS — BODY MASS INDEX: 31.08 KG/M2 | HEIGHT: 67 IN | WEIGHT: 198 LBS

## 2020-05-30 DIAGNOSIS — Z12.31 ENCOUNTER FOR SCREENING MAMMOGRAM FOR MALIGNANT NEOPLASM OF BREAST: ICD-10-CM

## 2020-05-30 PROCEDURE — 77063 BREAST TOMOSYNTHESIS BI: CPT

## 2020-05-30 PROCEDURE — 77067 SCR MAMMO BI INCL CAD: CPT

## 2020-06-17 ENCOUNTER — ANNUAL EXAM (OUTPATIENT)
Dept: OBGYN CLINIC | Facility: CLINIC | Age: 45
End: 2020-06-17
Payer: COMMERCIAL

## 2020-06-17 VITALS
HEIGHT: 67 IN | BODY MASS INDEX: 31.86 KG/M2 | SYSTOLIC BLOOD PRESSURE: 120 MMHG | WEIGHT: 203 LBS | DIASTOLIC BLOOD PRESSURE: 70 MMHG

## 2020-06-17 DIAGNOSIS — R92.2 DENSE BREAST TISSUE ON MAMMOGRAM: ICD-10-CM

## 2020-06-17 DIAGNOSIS — Z98.51 STATUS POST TUBAL LIGATION: ICD-10-CM

## 2020-06-17 DIAGNOSIS — B35.4 TINEA CORPORIS: ICD-10-CM

## 2020-06-17 DIAGNOSIS — Z01.419 WOMEN'S ANNUAL ROUTINE GYNECOLOGICAL EXAMINATION: Primary | ICD-10-CM

## 2020-06-17 PROCEDURE — S0612 ANNUAL GYNECOLOGICAL EXAMINA: HCPCS | Performed by: OBSTETRICS & GYNECOLOGY

## 2020-06-17 PROCEDURE — 3008F BODY MASS INDEX DOCD: CPT | Performed by: OBSTETRICS & GYNECOLOGY

## 2020-06-17 RX ORDER — CLOTRIMAZOLE AND BETAMETHASONE DIPROPIONATE 10; .64 MG/G; MG/G
CREAM TOPICAL 2 TIMES DAILY
Qty: 30 G | Refills: 2 | Status: SHIPPED | OUTPATIENT
Start: 2020-06-17 | End: 2022-06-24 | Stop reason: SDUPTHER

## 2020-06-19 LAB
CLINICAL INFO: NORMAL
CYTO CVX: NORMAL
DATE PREVIOUS BX: NORMAL
HPV E6+E7 MRNA CVX QL NAA+PROBE: NOT DETECTED
LMP START DATE: NORMAL
SL AMB PREV. PAP:: NORMAL
SPECIMEN SOURCE CVX/VAG CYTO: NORMAL

## 2020-11-13 ENCOUNTER — TELEPHONE (OUTPATIENT)
Dept: INTERNAL MEDICINE CLINIC | Facility: CLINIC | Age: 45
End: 2020-11-13

## 2020-11-17 ENCOUNTER — OFFICE VISIT (OUTPATIENT)
Dept: INTERNAL MEDICINE CLINIC | Facility: CLINIC | Age: 45
End: 2020-11-17
Payer: COMMERCIAL

## 2020-11-17 VITALS
SYSTOLIC BLOOD PRESSURE: 110 MMHG | BODY MASS INDEX: 31.61 KG/M2 | DIASTOLIC BLOOD PRESSURE: 70 MMHG | HEIGHT: 68 IN | RESPIRATION RATE: 16 BRPM | HEART RATE: 74 BPM | WEIGHT: 208.6 LBS | TEMPERATURE: 97.3 F | OXYGEN SATURATION: 99 %

## 2020-11-17 DIAGNOSIS — E55.9 VITAMIN D DEFICIENCY: ICD-10-CM

## 2020-11-17 DIAGNOSIS — Z23 ENCOUNTER FOR IMMUNIZATION: ICD-10-CM

## 2020-11-17 DIAGNOSIS — N91.2 AMENORRHEA: ICD-10-CM

## 2020-11-17 DIAGNOSIS — Z00.00 PHYSICAL EXAM, ANNUAL: Primary | ICD-10-CM

## 2020-11-17 DIAGNOSIS — M54.16 LUMBAR RADICULOPATHY: ICD-10-CM

## 2020-11-17 DIAGNOSIS — R73.03 PREDIABETES: ICD-10-CM

## 2020-11-17 PROCEDURE — 90471 IMMUNIZATION ADMIN: CPT | Performed by: INTERNAL MEDICINE

## 2020-11-17 PROCEDURE — 99396 PREV VISIT EST AGE 40-64: CPT | Performed by: INTERNAL MEDICINE

## 2020-11-17 PROCEDURE — 90686 IIV4 VACC NO PRSV 0.5 ML IM: CPT | Performed by: INTERNAL MEDICINE

## 2020-11-17 PROCEDURE — 3008F BODY MASS INDEX DOCD: CPT | Performed by: INTERNAL MEDICINE

## 2020-11-17 PROCEDURE — 3725F SCREEN DEPRESSION PERFORMED: CPT | Performed by: INTERNAL MEDICINE

## 2020-11-17 PROCEDURE — 1036F TOBACCO NON-USER: CPT | Performed by: INTERNAL MEDICINE

## 2020-12-04 ENCOUNTER — APPOINTMENT (OUTPATIENT)
Dept: LAB | Facility: CLINIC | Age: 45
End: 2020-12-04
Payer: COMMERCIAL

## 2020-12-04 DIAGNOSIS — N91.2 AMENORRHEA: ICD-10-CM

## 2020-12-04 LAB
25(OH)D3 SERPL-MCNC: 22.1 NG/ML (ref 30–100)
ALBUMIN SERPL BCP-MCNC: 4.1 G/DL (ref 3.5–5)
ALP SERPL-CCNC: 88 U/L (ref 46–116)
ALT SERPL W P-5'-P-CCNC: 44 U/L (ref 12–78)
ANION GAP SERPL CALCULATED.3IONS-SCNC: 6 MMOL/L (ref 4–13)
AST SERPL W P-5'-P-CCNC: 23 U/L (ref 5–45)
B-HCG SERPL-ACNC: <2 MIU/ML
BASOPHILS # BLD AUTO: 0.02 THOUSANDS/ΜL (ref 0–0.1)
BASOPHILS NFR BLD AUTO: 0 % (ref 0–1)
BILIRUB SERPL-MCNC: 1.37 MG/DL (ref 0.2–1)
BUN SERPL-MCNC: 12 MG/DL (ref 5–25)
CALCIUM SERPL-MCNC: 9.1 MG/DL (ref 8.3–10.1)
CHLORIDE SERPL-SCNC: 107 MMOL/L (ref 100–108)
CHOLEST SERPL-MCNC: 184 MG/DL (ref 50–200)
CO2 SERPL-SCNC: 26 MMOL/L (ref 21–32)
CREAT SERPL-MCNC: 0.59 MG/DL (ref 0.6–1.3)
EOSINOPHIL # BLD AUTO: 0.31 THOUSAND/ΜL (ref 0–0.61)
EOSINOPHIL NFR BLD AUTO: 4 % (ref 0–6)
ERYTHROCYTE [DISTWIDTH] IN BLOOD BY AUTOMATED COUNT: 13.5 % (ref 11.6–15.1)
EST. AVERAGE GLUCOSE BLD GHB EST-MCNC: 117 MG/DL
FSH SERPL-ACNC: 5 MIU/ML
GFR SERPL CREATININE-BSD FRML MDRD: 113 ML/MIN/1.73SQ M
GLUCOSE P FAST SERPL-MCNC: 101 MG/DL (ref 65–99)
HBA1C MFR BLD: 5.7 %
HCT VFR BLD AUTO: 43.5 % (ref 34.8–46.1)
HDLC SERPL-MCNC: 50 MG/DL
HGB BLD-MCNC: 14 G/DL (ref 11.5–15.4)
IMM GRANULOCYTES # BLD AUTO: 0.02 THOUSAND/UL (ref 0–0.2)
IMM GRANULOCYTES NFR BLD AUTO: 0 % (ref 0–2)
LDLC SERPL CALC-MCNC: 107 MG/DL (ref 0–100)
LH SERPL-ACNC: 2.1 MIU/ML
LYMPHOCYTES # BLD AUTO: 2.8 THOUSANDS/ΜL (ref 0.6–4.47)
LYMPHOCYTES NFR BLD AUTO: 35 % (ref 14–44)
MCH RBC QN AUTO: 27.9 PG (ref 26.8–34.3)
MCHC RBC AUTO-ENTMCNC: 32.2 G/DL (ref 31.4–37.4)
MCV RBC AUTO: 87 FL (ref 82–98)
MONOCYTES # BLD AUTO: 0.56 THOUSAND/ΜL (ref 0.17–1.22)
MONOCYTES NFR BLD AUTO: 7 % (ref 4–12)
NEUTROPHILS # BLD AUTO: 4.21 THOUSANDS/ΜL (ref 1.85–7.62)
NEUTS SEG NFR BLD AUTO: 54 % (ref 43–75)
NRBC BLD AUTO-RTO: 0 /100 WBCS
PLATELET # BLD AUTO: 282 THOUSANDS/UL (ref 149–390)
PMV BLD AUTO: 10.8 FL (ref 8.9–12.7)
POTASSIUM SERPL-SCNC: 3.6 MMOL/L (ref 3.5–5.3)
PROLACTIN SERPL-MCNC: 13.3 NG/ML
PROT SERPL-MCNC: 7.5 G/DL (ref 6.4–8.2)
RBC # BLD AUTO: 5.02 MILLION/UL (ref 3.81–5.12)
SODIUM SERPL-SCNC: 139 MMOL/L (ref 136–145)
TRIGL SERPL-MCNC: 133 MG/DL
TSH SERPL DL<=0.05 MIU/L-ACNC: 1.79 UIU/ML (ref 0.36–3.74)
WBC # BLD AUTO: 7.92 THOUSAND/UL (ref 4.31–10.16)

## 2020-12-04 PROCEDURE — 83001 ASSAY OF GONADOTROPIN (FSH): CPT

## 2020-12-04 PROCEDURE — 82306 VITAMIN D 25 HYDROXY: CPT | Performed by: INTERNAL MEDICINE

## 2020-12-04 PROCEDURE — 36415 COLL VENOUS BLD VENIPUNCTURE: CPT | Performed by: INTERNAL MEDICINE

## 2020-12-04 PROCEDURE — 83002 ASSAY OF GONADOTROPIN (LH): CPT

## 2020-12-04 PROCEDURE — 85025 COMPLETE CBC W/AUTO DIFF WBC: CPT | Performed by: INTERNAL MEDICINE

## 2020-12-04 PROCEDURE — 80061 LIPID PANEL: CPT | Performed by: INTERNAL MEDICINE

## 2020-12-04 PROCEDURE — 80053 COMPREHEN METABOLIC PANEL: CPT | Performed by: INTERNAL MEDICINE

## 2020-12-04 PROCEDURE — 84702 CHORIONIC GONADOTROPIN TEST: CPT

## 2020-12-04 PROCEDURE — 84443 ASSAY THYROID STIM HORMONE: CPT | Performed by: INTERNAL MEDICINE

## 2020-12-04 PROCEDURE — 84146 ASSAY OF PROLACTIN: CPT

## 2020-12-04 PROCEDURE — 83036 HEMOGLOBIN GLYCOSYLATED A1C: CPT | Performed by: INTERNAL MEDICINE

## 2020-12-14 ENCOUNTER — TELEPHONE (OUTPATIENT)
Dept: INTERNAL MEDICINE CLINIC | Facility: CLINIC | Age: 45
End: 2020-12-14

## 2020-12-22 ENCOUNTER — TELEPHONE (OUTPATIENT)
Dept: OBGYN CLINIC | Facility: CLINIC | Age: 45
End: 2020-12-22

## 2021-01-11 ENCOUNTER — TELEPHONE (OUTPATIENT)
Dept: OBGYN CLINIC | Facility: CLINIC | Age: 46
End: 2021-01-11

## 2021-01-11 NOTE — TELEPHONE ENCOUNTER
Patient called 1-11 8:33am  Said she had no cycle for 3 months and then had her cycle and bleed for 1 week  She saw her PCP and they did hormonal bloodwork and said she was fine  Pt was given appt in two weeks  Pt will speak with a nurse if she has PAIN or HEAVY bleeding

## 2021-06-02 ENCOUNTER — IMMUNIZATIONS (OUTPATIENT)
Dept: FAMILY MEDICINE CLINIC | Facility: HOSPITAL | Age: 46
End: 2021-06-02

## 2021-06-02 DIAGNOSIS — Z23 ENCOUNTER FOR IMMUNIZATION: Primary | ICD-10-CM

## 2021-06-02 PROCEDURE — 0011A SARS-COV-2 / COVID-19 MRNA VACCINE (MODERNA) 100 MCG: CPT

## 2021-06-02 PROCEDURE — 91301 SARS-COV-2 / COVID-19 MRNA VACCINE (MODERNA) 100 MCG: CPT

## 2021-06-05 ENCOUNTER — HOSPITAL ENCOUNTER (OUTPATIENT)
Dept: RADIOLOGY | Age: 46
Discharge: HOME | End: 2021-06-05
Payer: COMMERCIAL

## 2021-06-05 VITALS — BODY MASS INDEX: 30.01 KG/M2 | HEIGHT: 68 IN | WEIGHT: 198 LBS

## 2021-06-05 DIAGNOSIS — Z12.31 ENCOUNTER FOR SCREENING MAMMOGRAM FOR MALIGNANT NEOPLASM OF BREAST: ICD-10-CM

## 2021-06-05 PROCEDURE — 77063 BREAST TOMOSYNTHESIS BI: CPT

## 2021-06-05 PROCEDURE — 77067 SCR MAMMO BI INCL CAD: CPT

## 2021-06-09 DIAGNOSIS — R92.2 DENSE BREAST TISSUE ON MAMMOGRAM: ICD-10-CM

## 2021-06-09 DIAGNOSIS — Z84.81 FAMILY HISTORY OF BREAST CANCER GENE MUTATION IN FIRST DEGREE RELATIVE: Primary | ICD-10-CM

## 2021-06-15 ENCOUNTER — TELEPHONE (OUTPATIENT)
Dept: SURGICAL ONCOLOGY | Facility: CLINIC | Age: 46
End: 2021-06-15

## 2021-06-15 NOTE — TELEPHONE ENCOUNTER
New Patient Breast Form   Patient Details:     Apollo Esteves     1975     [id number]     Background Information:   [address] starts by opening a telephone encounter and gathering the following information   Who is calling to schedule and relationship? Patient   Referring Provider Catrina silva  gyn   To which speciality is the referral?  Surgical Oncology   Reason for Visit? New Diagnosis   Tumor Type?  none   Is this Cancer or Non-Cancer? Non-Cancer   Is there a confimed diagnosis from biopsy/tissue reviewed by Pathology? NA   Date of Tissue Diagnosis (If done outside of [facility] please obtain report and slides)    Is patient aware of diagnosis, and who made them aware? NA - dense breast   If no tissue diagnosis, please stop and discuss with Navigator prior to scheduling   When was the diagnosis made? 6/2021   Were outside slides requested  NA   If biopsy done externally, obtain reports and slides for internal review   Have you had any imaging or labs done? Yes   If YES, when and  where was the blood work and imaging done? [facility]   If outside of [facility] obtain records   Was the patient told to bring a disk? No   Are records in EPIC? Yes   Is there a personal history of cancer? No   If YES please list type and YR diagnosed    If patient has a prior history of breast cancer were old records obtained? NA   Have you ever had genetic testing done for breast cancer? If so, can you please bring a copy to appointment for timely treatment planning No   Is there a family history of cancer? Yes   If YES please list type Mom had breast    Scheduling Information:   [facility]   Are you willing to see another provider to be seen sooner? Yes  She is seeing Terri Kearns instead of Zoila Clifford   Are there any days the patient cannot be seen? No   How was New Patient Packet Sent?  [employer]   Miscellaneous:   After completing the above information, please route to finance, nurse navigation and clinical trials for review

## 2021-06-21 ENCOUNTER — ANNUAL EXAM (OUTPATIENT)
Dept: OBGYN CLINIC | Facility: CLINIC | Age: 46
End: 2021-06-21
Payer: COMMERCIAL

## 2021-06-21 VITALS
HEIGHT: 68 IN | BODY MASS INDEX: 30.19 KG/M2 | DIASTOLIC BLOOD PRESSURE: 84 MMHG | WEIGHT: 199.2 LBS | SYSTOLIC BLOOD PRESSURE: 116 MMHG

## 2021-06-21 DIAGNOSIS — Z98.51 STATUS POST TUBAL LIGATION: ICD-10-CM

## 2021-06-21 DIAGNOSIS — Z12.31 BREAST CANCER SCREENING BY MAMMOGRAM: ICD-10-CM

## 2021-06-21 DIAGNOSIS — N94.6 DYSMENORRHEA: ICD-10-CM

## 2021-06-21 DIAGNOSIS — Z01.419 WOMEN'S ANNUAL ROUTINE GYNECOLOGICAL EXAMINATION: ICD-10-CM

## 2021-06-21 DIAGNOSIS — R92.2 DENSE BREAST TISSUE ON MAMMOGRAM: ICD-10-CM

## 2021-06-21 PROCEDURE — 3008F BODY MASS INDEX DOCD: CPT | Performed by: OBSTETRICS & GYNECOLOGY

## 2021-06-21 PROCEDURE — 99396 PREV VISIT EST AGE 40-64: CPT | Performed by: OBSTETRICS & GYNECOLOGY

## 2021-06-21 PROCEDURE — 1036F TOBACCO NON-USER: CPT | Performed by: OBSTETRICS & GYNECOLOGY

## 2021-06-21 NOTE — PROGRESS NOTES
Assessment     Annual well-woman exam    Status post bilateral tubal ligation    Dense breast tissue on mammogram, consult with breast surgery for supplemental screening pending     Dysmenorrhea        Plan      Pap smear deferred, recent screening negative with negative HPV testing   All questions answered  Subjective  No acute distress here for exam     Jabari Pappas is a 42 y o  female who presents for annual exam  Periods are regular every 28-30 days, lasting 5 days  Dysmenorrhea:none  Cyclic symptoms include none  No intermenstrual bleeding, spotting, or discharge  The patient reports that there is not domestic violence in her life  Current contraception: tubal ligation  History of abnormal Pap smear: no  Family history of uterine or ovarian cancer: no  Regular self breast exam: yes  History of abnormal mammogram: no  Family history of breast cancer: no  History of abnormal lipids: no  Menstrual History:  OB History        2    Para   2    Term   2            AB        Living           SAB        TAB        Ectopic        Multiple        Live Births                    Menarche age: 17  Patient's last menstrual period was 2021 (exact date)  Review of Systems  Pertinent items are noted in HPI        Objective  No acute distress   /84 (BP Location: Left arm, Patient Position: Sitting, Cuff Size: Standard)   Ht 5' 7 5" (1 715 m)   Wt 90 4 kg (199 lb 3 2 oz)   LMP 2021 (Exact Date)   BMI 30 74 kg/m²     General:   alert and oriented, in no acute distress, alert, appears stated age and cooperative   Heart: regular rate and rhythm, S1, S2 normal, no murmur, click, rub or gallop   Lungs: clear to auscultation bilaterally   Abdomen: soft, non-tender, without masses or organomegaly   Vulva: normal, Bartholin's, Urethra, [place]'s normal, female escutcheon   Vagina: normal mucosa, normal discharge, no palpable nodules   Cervix: multiparous appearance, no bleeding following Pap and no cervical motion tenderness   Uterus: normal size, mobile, non-tender, normal shape and consistency   Adnexa: normal adnexa and no mass, fullness, tenderness   Bilateral breast exam in the sitting and supine position with chaperone present, no visible or palpable breast lesions identified  No breast masses noted  No supraclavicular or axillary lymphadenopathy noted  No nipple discharge  Reviewed self-breast exam techniques     Rectal exam,  deferred

## 2021-06-23 ENCOUNTER — OFFICE VISIT (OUTPATIENT)
Dept: URGENT CARE | Age: 46
End: 2021-06-23
Payer: COMMERCIAL

## 2021-06-23 VITALS
OXYGEN SATURATION: 97 % | TEMPERATURE: 101.1 F | BODY MASS INDEX: 30.16 KG/M2 | HEART RATE: 118 BPM | RESPIRATION RATE: 16 BRPM | WEIGHT: 199 LBS | HEIGHT: 68 IN

## 2021-06-23 DIAGNOSIS — J02.9 SORE THROAT: Primary | ICD-10-CM

## 2021-06-23 DIAGNOSIS — J03.90 TONSILLITIS: ICD-10-CM

## 2021-06-23 LAB — S PYO AG THROAT QL: NEGATIVE

## 2021-06-23 PROCEDURE — U0005 INFEC AGEN DETEC AMPLI PROBE: HCPCS | Performed by: NURSE PRACTITIONER

## 2021-06-23 PROCEDURE — U0003 INFECTIOUS AGENT DETECTION BY NUCLEIC ACID (DNA OR RNA); SEVERE ACUTE RESPIRATORY SYNDROME CORONAVIRUS 2 (SARS-COV-2) (CORONAVIRUS DISEASE [COVID-19]), AMPLIFIED PROBE TECHNIQUE, MAKING USE OF HIGH THROUGHPUT TECHNOLOGIES AS DESCRIBED BY CMS-2020-01-R: HCPCS | Performed by: NURSE PRACTITIONER

## 2021-06-23 PROCEDURE — G0382 LEV 3 HOSP TYPE B ED VISIT: HCPCS | Performed by: NURSE PRACTITIONER

## 2021-06-23 PROCEDURE — 87147 CULTURE TYPE IMMUNOLOGIC: CPT | Performed by: NURSE PRACTITIONER

## 2021-06-23 PROCEDURE — 87070 CULTURE OTHR SPECIMN AEROBIC: CPT | Performed by: NURSE PRACTITIONER

## 2021-06-23 RX ORDER — AZITHROMYCIN 250 MG/1
TABLET, FILM COATED ORAL
Qty: 6 TABLET | Refills: 0 | Status: SHIPPED | OUTPATIENT
Start: 2021-06-23 | End: 2021-06-27

## 2021-06-23 RX ORDER — ACETAMINOPHEN 325 MG/1
975 TABLET ORAL ONCE
Status: COMPLETED | OUTPATIENT
Start: 2021-06-23 | End: 2021-06-23

## 2021-06-23 RX ADMIN — ACETAMINOPHEN 975 MG: 325 TABLET ORAL at 12:06

## 2021-06-23 NOTE — PROGRESS NOTES
NAME: Najma Leal is a 42 y o  female  : 1975    MRN: [id number]    Pulse (!) 118   Temp (!) 101 1 °F (38 4 °C) (Tympanic)   Resp 16   Ht 5' 7 5" (1 715 m)   Wt 90 3 kg (199 lb)   LMP 2021 (Exact Date)   SpO2 97%   BMI 30 71 kg/m²     Assessment and Plan   Sore throat [J02 9]  1  Sore throat  POCT rapid strepA    acetaminophen (TYLENOL) tablet 975 mg    dexamethasone oral liquid 10 mg 1 mL    Throat culture    azithromycin (ZITHROMAX) 250 mg tablet    Novel Coronavirus (Covid-19),PCR [facility] - Office Collection   2  Tonsillitis  acetaminophen (TYLENOL) tablet 975 mg    dexamethasone oral liquid 10 mg 1 mL    Throat culture    azithromycin (ZITHROMAX) 250 mg tablet     Administrations This Visit     acetaminophen (TYLENOL) tablet 975 mg     Admin Date  2021 Action  Given Dose  975 mg Route  Oral Administered By  Kevin Fuller RN          dexamethasone oral liquid 10 mg 1 mL     Admin Date  2021 Action  Given Dose  10 mg Route  Oral Administered By  Kevin Fuller RN              Keily was seen today for sore throat  Diagnoses and all orders for this visit:    Sore throat  -     POCT rapid strepA  -     acetaminophen (TYLENOL) tablet 975 mg  -     dexamethasone oral liquid 10 mg 1 mL  -     Throat culture  -     azithromycin (ZITHROMAX) 250 mg tablet; Take 2 tablets today and only 1 pill daily until finished  -     Novel Coronavirus (Covid-19),PCR [facility] - Office Collection    Tonsillitis  -     acetaminophen (TYLENOL) tablet 975 mg  -     dexamethasone oral liquid 10 mg 1 mL  -     Throat culture  -     azithromycin (ZITHROMAX) 250 mg tablet; Take 2 tablets today and only 1 pill daily until finished      rapid strep negative sent for culture  covid testing done today    Patient Instructions   Patient Instructions   Follow up with pcp  Take meds as directed   Warm salt gargles  Increase fluids    Take zyrtec or allegra for symptoms along with flonase    Rapid strep was negative today and sent for a culture  Call in 2-3 days for throat culture results  Take tylenol and motrin for fever and pain      COVID testing done today  Stay home wait for results on your my chart marian  No work until labs are negative and fever free for 24 hours with out medications  If worse go to the ER    Follow up with PCP  Take meds as directed   Tylenol for fever and pain   Follow CDC guidelines daily at [facility]    Stay home until your labs are resulted and if negative and fever free may return to work    If symptoms worsen go to the ER    Isolate for 10 days from the onset of your symptoms if covid results are positive, you will NOT get a call from the office if your [employer] marian was reviewed  Please download the [employer] marian for your results  If your results are negative, you will not get a phone call, please download your [employer] marian  For your results    Download the [employer] marian as directed, directions given to pt at time of exam    Recommend taking VD3 2000IU daily and Vitamin C 1000mg every 12 hours     Multivitamin daily    Take zyrtec, allegra, or Claritin daily  Use flonase 1-2 sprays in each nare daily   Use nasal saline to the nose,   Use humidifer in room  Symptoms worsen go to ER  Rest      Continue to social distance, wash your hands, and wear your masks  Please continue to follow the CDC  gov guidelines daily for they are subject to change on COVID-19            Proceed to the nearest ER if symptoms worsen, Follow up with your PCP  Continue to social distance, wash your hands, and wear your masks  Please continue to follow the CDC  gov guidelines daily for they are subject to change on COVID-19    Chief Complaint     Chief Complaint   Patient presents with    Sore Throat     Pt c/o sore throat, left ear pain, and chills for the past 3 days  Pt only had 1st Moderna COVID, 2nd one scheduled for this month           History of Present Illness     41 yo female here today with sore throat for the past 24 hours, fever and no other symptoms  Pt works for a [employer]  She had the first covid shot first week of June and waiting for her second one  She has swollen tonsils, with white exudate all over both of them, pain with swallowing and  Fever present  Denies having CP, SOB at this time  She will be covid swabbed as well today  She denies any recent travel and has not been around anyone with COVID that she is aware of  She has not taken anything for fever today      Review of Systems   Review of Systems   Constitutional: Positive for fever  Negative for activity change and fatigue  HENT: Positive for sore throat and trouble swallowing (pain)  Negative for congestion, ear discharge, postnasal drip, rhinorrhea and sinus pain  Eyes: Negative  Respiratory: Negative for cough and chest tightness  Cardiovascular: Negative for chest pain  Gastrointestinal: Negative for nausea and vomiting  Skin: Negative for rash  Neurological: Negative for headaches  Current Medications       Current Outpatient Medications:     azithromycin (ZITHROMAX) 250 mg tablet, Take 2 tablets today and only 1 pill daily until finished , Disp: 6 tablet, Rfl: 0    cholecalciferol 2000 units TABS, Take 1 tablet (2,000 Units total) by mouth daily, Disp: 90 tablet, Rfl: 3    clotrimazole-betamethasone (LOTRISONE) 1-0 05 % cream, Apply topically 2 (two) times a day, Disp: 30 g, Rfl: 2  No current facility-administered medications for this visit  Current Allergies     Allergies as of 06/23/2021 - Reviewed 06/23/2021   Allergen Reaction Noted    Penicillins Swelling 09/29/2017              History reviewed  No pertinent past medical history      Past Surgical History:   Procedure Laterality Date    TUBAL LIGATION      WISDOM TOOTH EXTRACTION         Family History   Problem Relation Age of Onset    Diabetes Mother     Breast cancer Mother 68    No Known Problems Sister     Breast cancer Maternal Aunt 71    Breast cancer Maternal Aunt 72    Breast cancer Maternal Aunt 58    Breast cancer Maternal Aunt 54    No Known Problems Maternal Aunt     No Known Problems Maternal Aunt     No Known Problems Sister     No Known Problems Sister     Breast cancer Paternal Aunt         unknown age   Anna Courser No Known Problems Paternal Aunt     No Known Problems Paternal Aunt     No Known Problems Paternal Aunt     No Known Problems Paternal Aunt     No Known Problems Father     No Known Problems Daughter     No Known Problems Maternal Grandmother     No Known Problems Paternal Grandmother     No Known Problems Paternal Grandfather     No Known Problems Paternal Aunt     No Known Problems Paternal Aunt          Medications have been verified  The following portions of the patient's history were reviewed and updated as appropriate: allergies, current medications, past family history, past medical history, past social history, past surgical history and problem list     Objective   Pulse (!) 118   Temp (!) 101 1 °F (38 4 °C) (Tympanic)   Resp 16   Ht 5' 7 5" (1 715 m)   Wt 90 3 kg (199 lb)   LMP 06/02/2021 (Exact Date)   SpO2 97%   BMI 30 71 kg/m²      Physical Exam     Physical Exam  Constitutional:       General: She is not in acute distress  Appearance: She is well-developed and normal weight  HENT:      Head: Normocephalic  Right Ear: Hearing, tympanic membrane, ear canal and external ear normal       Left Ear: Hearing, tympanic membrane, ear canal and external ear normal       Nose: Nose normal       Right Sinus: No maxillary sinus tenderness or frontal sinus tenderness  Left Sinus: No maxillary sinus tenderness or frontal sinus tenderness  Mouth/Throat:      Mouth: Mucous membranes are moist  No oral lesions  Tongue: No lesions  Pharynx: Pharyngeal swelling, oropharyngeal exudate, posterior oropharyngeal erythema and uvula swelling present  Tonsils: No tonsillar exudate   2+ on the right  2+ on the left  Neck:      Thyroid: No thyroid mass  Trachea: Trachea normal    Cardiovascular:      Rate and Rhythm: Regular rhythm  Tachycardia present  Pulses: Normal pulses  Pulmonary:      Effort: Pulmonary effort is normal       Breath sounds: Normal breath sounds and air entry  No stridor  No decreased breath sounds or wheezing  Abdominal:      Tenderness: There is no abdominal tenderness  Musculoskeletal:      Cervical back: Full passive range of motion without pain and normal range of motion  Neurological:      General: No focal deficit present  Mental Status: She is alert and oriented to person, place, and time  Psychiatric:         Attention and Perception: Attention normal          Behavior: Behavior is cooperative  Based on assessment pt is being treated with zpak and has an allergy to PCN  Pt aware if symptoms worsen to go to the ER      Note: Portions of this record may have been created with voice recognition software  Occasional wrong word or "sound a like" substitutions may have occurred due to the inherent limitations of voice recognition software  Please read the chart carefully and recognize, using context, where substitutions have occurred  MANJU Beal

## 2021-06-23 NOTE — LETTER
June 23, 2021     Patient: Wilbert Avendano   YOB: 1975   Date of Visit: 6/23/2021       To Whom It May Concern: It is my medical opinion that Edie Woodruff may return to work on day labs are negative and pt is fever free for over 24 hours without medications            Sincerely,        MANJU Pierce    CC: No Recipients

## 2021-06-23 NOTE — PATIENT INSTRUCTIONS
Follow up with pcp  Take meds as directed   Warm salt gargles  Increase fluids    Take zyrtec or allegra for symptoms along with flonase    Rapid strep was negative today and sent for a culture  Call in 2-3 days for throat culture results  Take tylenol and motrin for fever and pain      COVID testing done today  Stay home wait for results on your my chart marian  No work until labs are negative and fever free for 24 hours with out medications  If worse go to the ER    Follow up with PCP  Take meds as directed   Tylenol for fever and pain   Follow CDC guidelines daily at [facility]    Stay home until your labs are resulted and if negative and fever free may return to work    If symptoms worsen go to the ER    Isolate for 10 days from the onset of your symptoms if covid results are positive, you will NOT get a call from the office if your [employer] marian was reviewed  Please download the [employer] marian for your results  If your results are negative, you will not get a phone call, please download your [employer] marian  For your results    Download the [employer] marian as directed, directions given to pt at time of exam    Recommend taking VD3 2000IU daily and Vitamin C 1000mg every 12 hours     Multivitamin daily    Take zyrtec, allegra, or Claritin daily  Use flonase 1-2 sprays in each nare daily   Use nasal saline to the nose,   Use humidifer in room  Symptoms worsen go to ER  Rest      Continue to social distance, wash your hands, and wear your masks  Please continue to follow the CDC  gov guidelines daily for they are subject to change on COVID-19

## 2021-06-24 LAB — SARS-COV-2 RNA RESP QL NAA+PROBE: NEGATIVE

## 2021-06-25 LAB — BACTERIA THROAT CULT: ABNORMAL

## 2021-06-26 ENCOUNTER — IMMUNIZATIONS (OUTPATIENT)
Dept: FAMILY MEDICINE CLINIC | Facility: HOSPITAL | Age: 46
End: 2021-06-26

## 2021-06-26 ENCOUNTER — HOSPITAL ENCOUNTER (EMERGENCY)
Facility: HOSPITAL | Age: 46
Discharge: HOME | End: 2021-06-26
Attending: EMERGENCY MEDICINE
Payer: COMMERCIAL

## 2021-06-26 VITALS
OXYGEN SATURATION: 98 % | TEMPERATURE: 98.8 F | HEART RATE: 91 BPM | SYSTOLIC BLOOD PRESSURE: 135 MMHG | DIASTOLIC BLOOD PRESSURE: 77 MMHG | RESPIRATION RATE: 17 BRPM

## 2021-06-26 DIAGNOSIS — Z23 ENCOUNTER FOR IMMUNIZATION: Primary | ICD-10-CM

## 2021-06-26 DIAGNOSIS — J02.9 PHARYNGITIS: Primary | ICD-10-CM

## 2021-06-26 LAB
HETEROPH AB SER QL: NEGATIVE
S PYO DNA THROAT QL NAA+PROBE: NORMAL

## 2021-06-26 PROCEDURE — 99284 EMERGENCY DEPT VISIT MOD MDM: CPT | Performed by: PHYSICIAN ASSISTANT

## 2021-06-26 PROCEDURE — 91301 SARS-COV-2 / COVID-19 MRNA VACCINE (MODERNA) 100 MCG: CPT

## 2021-06-26 PROCEDURE — 36415 COLL VENOUS BLD VENIPUNCTURE: CPT | Performed by: PHYSICIAN ASSISTANT

## 2021-06-26 PROCEDURE — 0012A SARS-COV-2 / COVID-19 MRNA VACCINE (MODERNA) 100 MCG: CPT

## 2021-06-26 PROCEDURE — 86664 EPSTEIN-BARR NUCLEAR ANTIGEN: CPT | Performed by: PHYSICIAN ASSISTANT

## 2021-06-26 PROCEDURE — 99283 EMERGENCY DEPT VISIT LOW MDM: CPT

## 2021-06-26 PROCEDURE — 86308 HETEROPHILE ANTIBODY SCREEN: CPT | Performed by: PHYSICIAN ASSISTANT

## 2021-06-26 PROCEDURE — 87651 STREP A DNA AMP PROBE: CPT | Performed by: PHYSICIAN ASSISTANT

## 2021-06-26 PROCEDURE — 86665 EPSTEIN-BARR CAPSID VCA: CPT | Performed by: PHYSICIAN ASSISTANT

## 2021-06-26 PROCEDURE — 86663 EPSTEIN-BARR ANTIBODY: CPT | Performed by: PHYSICIAN ASSISTANT

## 2021-06-26 RX ORDER — DEXAMETHASONE 4 MG/1
4 TABLET ORAL 2 TIMES DAILY WITH MEALS
Qty: 4 TABLET | Refills: 0 | OUTPATIENT
Start: 2021-06-27 | End: 2021-06-29

## 2021-06-26 RX ORDER — SENNOSIDES 8.6 MG
650 CAPSULE ORAL EVERY 8 HOURS PRN
Qty: 30 TABLET | Refills: 0 | Status: SHIPPED | OUTPATIENT
Start: 2021-06-26

## 2021-06-26 RX ORDER — NAPROXEN 500 MG/1
500 TABLET ORAL 2 TIMES DAILY PRN
Qty: 30 TABLET | Refills: 0 | Status: SHIPPED | OUTPATIENT
Start: 2021-06-26

## 2021-06-26 RX ADMIN — DEXAMETHASONE SODIUM PHOSPHATE 10 MG: 10 INJECTION, SOLUTION INTRAMUSCULAR; INTRAVENOUS at 09:01

## 2021-06-26 NOTE — DISCHARGE INSTRUCTIONS
Please refer to the attached information for strict return instructions  If symptoms worsen or new symptoms develop please return to the ER  We will call with further test results if positive  Continue previously prescribed antibiotic for full course  Drink plenty of fluids to stay hydrated

## 2021-06-26 NOTE — ED PROVIDER NOTES
History  Chief Complaint   Patient presents with    Sore Throat     sore throat since wednesday, seen at urgent care reporting negative strep and COVID swab  prescribed abx taking since wednesday reports no improvement in symptoms  Valentin Salas is a 43 yo F presenting with generalized sore throat over the past 3-4 days  She reports she was evaluated at urgent care on 6/23 where she was placed on azithromycin  Strep testing as well as COVID-19 testing from that visit was subsequently negative  She reports pain with swallowing but has still been tolerating PO  Reports fevers the first day but these have resolved  No known sick contacts or recent travel  History provided by:  Patient   used: No    Sore Throat  Location:  Generalized  Duration:  4 days  Timing:  Constant  Progression:  Unchanged  Chronicity:  New  Relieved by:  Nothing  Worsened by:  Nothing  Ineffective treatments: azithromycin  Associated symptoms: no abdominal pain, no chest pain, no chills, no cough, no ear pain, no epistaxis, no fever, no headaches, no neck stiffness, no rash, no rhinorrhea, no shortness of breath, no trouble swallowing and no voice change    Risk factors: no exposure to strep, no exposure to mono and no sick contacts        Prior to Admission Medications   Prescriptions Last Dose Informant Patient Reported? Taking? azithromycin (ZITHROMAX) 250 mg tablet   No No   Sig: Take 2 tablets today and only 1 pill daily until finished  cholecalciferol 2000 units TABS   No No   Sig: Take 1 tablet (2,000 Units total) by mouth daily   clotrimazole-betamethasone (LOTRISONE) 1-0 05 % cream   No No   Sig: Apply topically 2 (two) times a day      Facility-Administered Medications: None       History reviewed  No pertinent past medical history      Past Surgical History:   Procedure Laterality Date    TUBAL LIGATION      WISDOM TOOTH EXTRACTION         Family History   Problem Relation Age of Onset    Diabetes Mother  Breast cancer Mother 68    No Known Problems Sister     Breast cancer Maternal Aunt 71    Breast cancer Maternal Aunt 72    Breast cancer Maternal Aunt 58    Breast cancer Maternal Aunt 54    No Known Problems Maternal Aunt     No Known Problems Maternal Aunt     No Known Problems Sister     No Known Problems Sister     Breast cancer Paternal Aunt         unknown age   [employer] No Known Problems Paternal Aunt     No Known Problems Paternal Aunt     No Known Problems Paternal Aunt     No Known Problems Paternal Aunt     No Known Problems Father     No Known Problems Daughter     No Known Problems Maternal Grandmother     No Known Problems Paternal Grandmother     No Known Problems Paternal Grandfather     No Known Problems Paternal Aunt     No Known Problems Paternal Aunt      I have reviewed and agree with the history as documented  E-Cigarette/Vaping    E-Cigarette Use Never User      E-Cigarette/Vaping Substances    Nicotine No     THC No     CBD No     Flavoring No      Social History     Tobacco Use    Smoking status: Never Smoker    Smokeless tobacco: Never Used   Vaping Use    Vaping Use: Never used   Substance Use Topics    Alcohol use: No    Drug use: No       Review of Systems   Constitutional: Negative for chills and fever  HENT: Positive for sore throat  Negative for congestion, ear pain, nosebleeds, rhinorrhea, trouble swallowing and voice change  Eyes: Negative for pain and visual disturbance  Respiratory: Negative for cough, shortness of breath and wheezing  Cardiovascular: Negative for chest pain and palpitations  Gastrointestinal: Negative for abdominal pain, nausea and vomiting  Genitourinary: Negative for dysuria, frequency and urgency  Musculoskeletal: Negative for back pain, neck pain and neck stiffness  Skin: Negative for rash and wound  Neurological: Negative for dizziness, weakness, light-headedness, numbness and headaches         Physical Exam  Physical Exam  Constitutional:       General: She is not in acute distress  Appearance: She is well-developed  She is not toxic-appearing or diaphoretic  HENT:      Head: Normocephalic and atraumatic  Right Ear: Tympanic membrane, ear canal and external ear normal       Left Ear: Tympanic membrane, ear canal and external ear normal       Mouth/Throat:      Pharynx: Uvula midline  Oropharyngeal exudate present  Tonsils: Tonsillar exudate present  2+ on the right  2+ on the left  Eyes:      Conjunctiva/sclera: Conjunctivae normal       Pupils: Pupils are equal, round, and reactive to light  Cardiovascular:      Rate and Rhythm: Normal rate and regular rhythm  Heart sounds: Normal heart sounds  No murmur heard  No friction rub  No gallop  Pulmonary:      Effort: Pulmonary effort is normal  No respiratory distress  Breath sounds: Normal breath sounds  No wheezing  Abdominal:      General: There is no distension  Palpations: Abdomen is soft  Tenderness: There is no abdominal tenderness  Musculoskeletal:      Cervical back: Normal range of motion and neck supple  Lymphadenopathy:      Cervical: No cervical adenopathy  Right cervical: No posterior cervical adenopathy  Left cervical: No posterior cervical adenopathy  Skin:     General: Skin is warm and dry  Capillary Refill: Capillary refill takes less than 2 seconds  Findings: No erythema or rash  Neurological:      Mental Status: She is alert and oriented to person, place, and time  Motor: No abnormal muscle tone  Coordination: Coordination normal    Psychiatric:         Behavior: Behavior normal          Thought Content:  Thought content normal          Judgment: Judgment normal          Vital Signs  ED Triage Vitals [06/26/21 0820]   Temperature Pulse Respirations Blood Pressure SpO2   98 8 °F (37 1 °C) 91 17 135/77 98 %      Temp Source Heart Rate Source Patient Position - Orthostatic VS BP Location FiO2 (%)   Oral Monitor Sitting Right arm --      Pain Score       --           Vitals:    06/26/21 0820   BP: 135/77   Pulse: 91   Patient Position - Orthostatic VS: Sitting         Visual Acuity      ED Medications  Medications   dexamethasone oral liquid 10 mg 1 mL (10 mg Oral Given 6/26/21 0901)       Diagnostic Studies  Results Reviewed     Procedure Component Value Units Date/Time    Strep A PCR [[id number]]  (Normal) Collected: 06/26/21 0856    Lab Status: Final result Specimen: Throat Updated: 06/26/21 0947     STREP A PCR None Detected    EBV acute panel [[id number]] Collected: 06/26/21 0856    Lab Status: In process Specimen: Blood from Arm, Right Updated: 06/26/21 0906    Mononucleosis screen [[id number]] Collected: 06/26/21 0856    Lab Status: In process Specimen: Blood from Arm, Right Updated: 06/26/21 0904                 No orders to display              Procedures  Procedures         ED Course                                           MDM  Number of Diagnoses or Management Options  Pharyngitis  Diagnosis management comments: Persistent bilateral sore throat as well as tonsillar enlargement and exudate noted on exam  She is tolerating PO, afebrile here, and non-toxic on exam with midline uvula  Recent negative strep, COVID-19 testing negative  Will repeat strep testing here to further exclude, will also add monospot/EBV panel and provide decadron for symptomatic relief   Plan of short decadron course, continued azithromycin at home pending negative strep test         Amount and/or Complexity of Data Reviewed  Clinical lab tests: ordered and reviewed    Patient Progress  Patient progress: stable      Disposition  Final diagnoses:   Pharyngitis     Time reflects when diagnosis was documented in both MDM as applicable and the Disposition within this note     Time User Action Codes Description Comment    6/26/2021  9:52 AM Marilia Valdivia Add [J02 9] Pharyngitis       ED Disposition     ED Disposition Condition Date/Time Comment    Discharge Stable Sat Jun 26, 2021  9:52 AM Fiordaliza Mann discharge to home/self care  Follow-up Information     Follow up With Specialties Details Why Contact Info Additional Information    Patrick Anders MD Internal Medicine Schedule an appointment as soon as possible for a visit   [address] [facility]  [address] [facility] Emergency Department Emergency Medicine  If symptoms worsen [facility] [id number]  [address] [facility] Emergency Department, [address] [facility] , [place], [place], [address]          Discharge Medication List as of 6/26/2021  9:58 AM      START taking these medications    Details   acetaminophen (TYLENOL) 650 mg CR tablet Take 1 tablet (650 mg total) by mouth every 8 (eight) hours as needed for mild pain, Starting Sat 6/26/2021, Normal      dexamethasone (DECADRON) 4 mg tablet Take 1 tablet (4 mg total) by mouth 2 (two) times a day with meals for 2 days, Starting Sun 6/27/2021, Until Tue 6/29/2021, Normal      naproxen (NAPROSYN) 500 mg tablet Take 1 tablet (500 mg total) by mouth 2 (two) times a day as needed for mild pain or moderate pain, Starting Sat 6/26/2021, Normal         CONTINUE these medications which have NOT CHANGED    Details   azithromycin (ZITHROMAX) 250 mg tablet Take 2 tablets today and only 1 pill daily until finished , Normal      cholecalciferol 2000 units TABS Take 1 tablet (2,000 Units total) by mouth daily, Starting Thu 2/7/2019, Normal      clotrimazole-betamethasone (LOTRISONE) 1-0 05 % cream Apply topically 2 (two) times a day, Starting Wed 6/17/2020, Normal           No discharge procedures on file      PDMP Review     None          ED Provider  Electronically Signed by           Brooklyn Rowe PA-C  06/26/21 1106

## 2021-06-26 NOTE — Clinical Note
Samara French was seen and treated in our emergency department on 6/26/2021  Diagnosis:     Keily    She may return on this date: 06/29/2021         If you have any questions or concerns, please don't hesitate to call        Xavier Elam PA-C    ______________________________           _______________          _______________  Hospital Representative                              Date                                Time

## 2021-06-28 LAB
EBV NA IGG SER IA-ACNC: <18 U/ML (ref 0–17.9)
EBV VCA IGG SER IA-ACNC: >600 U/ML (ref 0–17.9)
EBV VCA IGM SER IA-ACNC: <36 U/ML (ref 0–35.9)
INTERPRETATION: ABNORMAL

## 2021-06-29 ENCOUNTER — HOSPITAL ENCOUNTER (EMERGENCY)
Facility: HOSPITAL | Age: 46
Discharge: HOME | End: 2021-06-29
Attending: EMERGENCY MEDICINE | Admitting: EMERGENCY MEDICINE
Payer: COMMERCIAL

## 2021-06-29 VITALS
SYSTOLIC BLOOD PRESSURE: 135 MMHG | DIASTOLIC BLOOD PRESSURE: 77 MMHG | RESPIRATION RATE: 16 BRPM | OXYGEN SATURATION: 98 % | HEART RATE: 84 BPM | TEMPERATURE: 98 F

## 2021-06-29 DIAGNOSIS — B27.00 GAMMAHERPESVIRAL MONONUCLEOSIS WITHOUT COMPLICATION: Primary | ICD-10-CM

## 2021-06-29 PROCEDURE — 96372 THER/PROPH/DIAG INJ SC/IM: CPT

## 2021-06-29 PROCEDURE — 99283 EMERGENCY DEPT VISIT LOW MDM: CPT

## 2021-06-29 PROCEDURE — 99284 EMERGENCY DEPT VISIT MOD MDM: CPT | Performed by: EMERGENCY MEDICINE

## 2021-06-29 RX ORDER — KETOROLAC TROMETHAMINE 30 MG/ML
30 INJECTION, SOLUTION INTRAMUSCULAR; INTRAVENOUS ONCE
Status: COMPLETED | OUTPATIENT
Start: 2021-06-29 | End: 2021-06-29

## 2021-06-29 RX ORDER — DEXAMETHASONE SODIUM PHOSPHATE 4 MG/ML
8 INJECTION, SOLUTION INTRA-ARTICULAR; INTRALESIONAL; INTRAMUSCULAR; INTRAVENOUS; SOFT TISSUE ONCE
Status: COMPLETED | OUTPATIENT
Start: 2021-06-29 | End: 2021-06-29

## 2021-06-29 RX ORDER — HYDROCODONE BITARTRATE AND ACETAMINOPHEN 5; 325 MG/1; MG/1
1 TABLET ORAL EVERY 4 HOURS PRN
Qty: 10 TABLET | Refills: 0 | Status: SHIPPED | OUTPATIENT
Start: 2021-06-29 | End: 2021-07-09

## 2021-06-29 RX ADMIN — DEXAMETHASONE SODIUM PHOSPHATE 8 MG: 4 INJECTION INTRA-ARTICULAR; INTRALESIONAL; INTRAMUSCULAR; INTRAVENOUS; SOFT TISSUE at 11:31

## 2021-06-29 RX ADMIN — KETOROLAC TROMETHAMINE 30 MG: 30 INJECTION, SOLUTION INTRAMUSCULAR; INTRAVENOUS at 11:33

## 2021-06-29 NOTE — Clinical Note
Ana Davisonlidayonathan was seen and treated in our emergency department on 6/29/2021  Diagnosis:     Keily    She may return on this date: 06/30/2021         If you have any questions or concerns, please don't hesitate to call        Gabe Viramontes RN    ______________________________           _______________          _______________  Hospital Representative                              Date                                Time

## 2021-06-29 NOTE — Clinical Note
Darby Benz was seen and treated in our emergency department on 6/29/2021  Diagnosis:     Keily    She may return on this date: 07/02/2021         If you have any questions or concerns, please don't hesitate to call        Christopher Napier RN    ______________________________           _______________          _______________  Hospital Representative                              Date                                Time

## 2021-06-29 NOTE — ED PROVIDER NOTES
History  Chief Complaint   Patient presents with    Sore Throat     sore throat x 1 week  Pt was seen here on saturday for same and states medication is not working      29-year-old female presents to the ED with ongoing sore throat specifically on the right side  She was seen here June 26 for the same  Her rapid strep was negative but her mono test was positive  She was given oral steroids and Naprosyn  She states she is taking these without relief  She is able to swallow with pain  No fevers or chills  History provided by:  Patient   used: No    Sore Throat  Location:  Right  Quality:  Aching  Severity:  Moderate  Onset quality:  Gradual  Duration:  4 days  Timing:  Constant  Progression:  Unchanged  Chronicity:  New  Relieved by:  Nothing  Worsened by:  Swallowing  Ineffective treatments:  Acetaminophen and NSAIDs  Associated symptoms: adenopathy and ear pain    Associated symptoms: no abdominal pain, no chest pain, no chills, no cough, no drooling, no ear discharge, no eye discharge, no fever, no headaches, no neck stiffness, no night sweats, no plugged ear sensation, no postnasal drip, no rash, no rhinorrhea, no shortness of breath, no sinus congestion, no stridor, no trouble swallowing and no voice change    Risk factors: no exposure to strep, no exposure to mono and no sick contacts        Prior to Admission Medications   Prescriptions Last Dose Informant Patient Reported?  Taking?   acetaminophen (TYLENOL) 650 mg CR tablet   No No   Sig: Take 1 tablet (650 mg total) by mouth every 8 (eight) hours as needed for mild pain   cholecalciferol 2000 units TABS   No No   Sig: Take 1 tablet (2,000 Units total) by mouth daily   clotrimazole-betamethasone (LOTRISONE) 1-0 05 % cream   No No   Sig: Apply topically 2 (two) times a day   dexamethasone (DECADRON) 4 mg tablet   No Yes   Sig: Take 1 tablet (4 mg total) by mouth 2 (two) times a day with meals for 2 days   naproxen (NAPROSYN) 500 mg tablet   No No   Sig: Take 1 tablet (500 mg total) by mouth 2 (two) times a day as needed for mild pain or moderate pain      Facility-Administered Medications: None       No past medical history on file  Past Surgical History:   Procedure Laterality Date    TUBAL LIGATION      WISDOM TOOTH EXTRACTION         Family History   Problem Relation Age of Onset    Diabetes Mother     Breast cancer Mother 68    No Known Problems Sister     Breast cancer Maternal Aunt 71    Breast cancer Maternal Aunt 72    Breast cancer Maternal Aunt 58    Breast cancer Maternal Aunt 54    No Known Problems Maternal Aunt     No Known Problems Maternal Aunt     No Known Problems Sister     No Known Problems Sister     Breast cancer Paternal Aunt         unknown age   [facility] No Known Problems Paternal Aunt     No Known Problems Paternal Aunt     No Known Problems Paternal Aunt     No Known Problems Paternal Aunt     No Known Problems Father     No Known Problems Daughter     No Known Problems Maternal Grandmother     No Known Problems Paternal Grandmother     No Known Problems Paternal Grandfather     No Known Problems Paternal Aunt     No Known Problems Paternal Aunt      I have reviewed and agree with the history as documented  E-Cigarette/Vaping    E-Cigarette Use Never User      E-Cigarette/Vaping Substances    Nicotine No     THC No     CBD No     Flavoring No      Social History     Tobacco Use    Smoking status: Never Smoker    Smokeless tobacco: Never Used   Vaping Use    Vaping Use: Never used   Substance Use Topics    Alcohol use: No    Drug use: No       Review of Systems   Constitutional: Negative  Negative for chills, diaphoresis, fatigue, fever and night sweats  HENT: Positive for ear pain and sore throat  Negative for congestion, drooling, ear discharge, facial swelling, postnasal drip, rhinorrhea, trouble swallowing and voice change  Eyes: Negative    Negative for discharge, redness and itching  Respiratory: Negative  Negative for apnea, cough, chest tightness, shortness of breath, wheezing and stridor  Cardiovascular: Negative for chest pain, palpitations and leg swelling  Gastrointestinal: Negative  Negative for abdominal pain  Endocrine: Negative  Genitourinary: Negative  Negative for flank pain, frequency and urgency  Musculoskeletal: Negative  Negative for back pain and neck stiffness  Skin: Negative  Negative for rash  Allergic/Immunologic: Negative  Neurological: Negative  Negative for dizziness, syncope, weakness, light-headedness, numbness and headaches  Hematological: Positive for adenopathy  All other systems reviewed and are negative  Physical Exam  Physical Exam  Vitals and nursing note reviewed  Constitutional:       General: She is awake  She is not in acute distress  Appearance: Normal appearance  She is well-developed and overweight  She is not ill-appearing, toxic-appearing or diaphoretic  HENT:      Head: Normocephalic and atraumatic  Right Ear: Tympanic membrane and external ear normal       Left Ear: Tympanic membrane and external ear normal       Nose: Nose normal       Mouth/Throat:      Mouth: Mucous membranes are moist       Pharynx: Posterior oropharyngeal erythema present  No oropharyngeal exudate or uvula swelling  Tonsils: 3+ on the right  3+ on the left  Comments: Normal voice  Handling secretions well  Eyes:      General:         Right eye: No discharge  Left eye: No discharge  Conjunctiva/sclera: Conjunctivae normal       Pupils: Pupils are equal, round, and reactive to light  Neck:      Thyroid: No thyromegaly  Vascular: No JVD  Trachea: No tracheal deviation  Cardiovascular:      Rate and Rhythm: Normal rate and regular rhythm  Heart sounds: Normal heart sounds  No murmur heard  No friction rub  No gallop      Pulmonary:      Effort: Pulmonary effort is normal  No respiratory distress  Breath sounds: Normal breath sounds  No stridor  No wheezing, rhonchi or rales  Chest:      Chest wall: No tenderness  Musculoskeletal:      Cervical back: Normal range of motion and neck supple  No rigidity  Lymphadenopathy:      Cervical: Cervical adenopathy present  Skin:     General: Skin is warm and dry  Coloration: Skin is not jaundiced or pale  Findings: No bruising, erythema, lesion or rash  Neurological:      General: No focal deficit present  Mental Status: She is alert and oriented to person, place, and time  Motor: No weakness or abnormal muscle tone  Deep Tendon Reflexes: Reflexes are normal and symmetric  Psychiatric:         Mood and Affect: Mood normal          Behavior: Behavior is cooperative  Vital Signs  ED Triage Vitals   Temperature Pulse Respirations Blood Pressure SpO2   06/29/21 1040 06/29/21 1039 06/29/21 1039 06/29/21 1039 06/29/21 1039   98 °F (36 7 °C) 84 16 135/77 98 %      Temp src Heart Rate Source Patient Position - Orthostatic VS BP Location FiO2 (%)   -- 06/29/21 1039 06/29/21 1039 06/29/21 1039 --    Monitor Sitting Right arm       Pain Score       --                  Vitals:    06/29/21 1039   BP: 135/77   Pulse: 84   Patient Position - Orthostatic VS: Sitting         Visual Acuity      ED Medications  Medications   dexamethasone (DECADRON) injection 8 mg (has no administration in time range)   ketorolac (TORADOL) injection 30 mg (has no administration in time range)       Diagnostic Studies  Results Reviewed     None                 No orders to display              Procedures  Procedures         ED Course                             SBIRT 20yo+      Most Recent Value   SBIRT (24 yo +)   In order to provide better care to our patients, we are screening all of our patients for alcohol and drug use  Would it be okay to ask you these screening questions?   Yes Filed at: 06/29/2021 1048   Initial Alcohol Screen: US AUDIT-C    1  How often do you have a drink containing alcohol?  0 Filed at: 06/29/2021 1048   2  How many drinks containing alcohol do you have on a typical day you are drinking? 0 Filed at: 06/29/2021 1048   3a  Male UNDER 65: How often do you have five or more drinks on one occasion? 0 Filed at: 06/29/2021 1048   3b  FEMALE Any Age, or MALE 65+: How often do you have 4 or more drinks on one occassion? 0 Filed at: 06/29/2021 1048   Audit-C Score  0 Filed at: 06/29/2021 1048   GLEN: How many times in the past year have you    Used an illegal drug or used a prescription medication for non-medical reasons? Never Filed at: 06/29/2021 1048                    MDM  Number of Diagnoses or Management Options  Diagnosis management comments: 29-year-old female presents with ongoing right-sided sore throat  She was seen here 3 days ago and diagnosed with mono  She was unaware the positive mono test   She states the meds given to her which consisted of oral Decadron and Naprosyn are not helping  On exam she is alert in no acute distress  She has no stridor, her voice is normal and she is handling secretions well  She does have significant pharyngeal erythema without exudates  Will treat with IM Toradol, IM Decadron and prescribe a short course of narcotic pain medication for symptom control  She is stable for discharge  Disposition  Final diagnoses:   None     ED Disposition     None      Follow-up Information    None         Patient's Medications   Discharge Prescriptions    No medications on file     No discharge procedures on file      PDMP Review     None          ED Provider  Electronically Signed by           Jazmine Rowell DO  06/29/21 4671

## 2021-06-30 ENCOUNTER — HOSPITAL ENCOUNTER (INPATIENT)
Facility: HOSPITAL | Age: 46
LOS: 1 days | Discharge: HOME | DRG: 872 | End: 2021-07-02
Attending: EMERGENCY MEDICINE
Payer: COMMERCIAL

## 2021-06-30 ENCOUNTER — APPOINTMENT (EMERGENCY)
Dept: CT IMAGING | Facility: HOSPITAL | Age: 46
DRG: 872 | End: 2021-06-30
Payer: COMMERCIAL

## 2021-06-30 DIAGNOSIS — J02.9 THROAT INFECTION: ICD-10-CM

## 2021-06-30 DIAGNOSIS — J36 PERITONSILLAR ABSCESS: ICD-10-CM

## 2021-06-30 DIAGNOSIS — J03.90 TONSILLITIS: Primary | ICD-10-CM

## 2021-06-30 DIAGNOSIS — J36 TONSILLAR ABSCESS: ICD-10-CM

## 2021-06-30 DIAGNOSIS — D72.829 LEUKOCYTOSIS: ICD-10-CM

## 2021-06-30 DIAGNOSIS — J02.9 PHARYNGITIS: ICD-10-CM

## 2021-06-30 LAB
ALBUMIN SERPL BCP-MCNC: 3.7 G/DL (ref 3.5–5)
ALP SERPL-CCNC: 96 U/L (ref 46–116)
ALT SERPL W P-5'-P-CCNC: 28 U/L (ref 12–78)
ANION GAP SERPL CALCULATED.3IONS-SCNC: 6 MMOL/L (ref 4–13)
AST SERPL W P-5'-P-CCNC: 18 U/L (ref 5–45)
BACTERIA UR QL AUTO: ABNORMAL /HPF
BASOPHILS # BLD AUTO: 0.09 THOUSANDS/ΜL (ref 0–0.1)
BASOPHILS NFR BLD AUTO: 0 % (ref 0–1)
BILIRUB SERPL-MCNC: 0.79 MG/DL (ref 0.2–1)
BILIRUB UR QL STRIP: NEGATIVE
BUN SERPL-MCNC: 17 MG/DL (ref 5–25)
CALCIUM SERPL-MCNC: 9.2 MG/DL (ref 8.3–10.1)
CHLORIDE SERPL-SCNC: 103 MMOL/L (ref 100–108)
CLARITY UR: CLEAR
CO2 SERPL-SCNC: 30 MMOL/L (ref 21–32)
COLOR UR: YELLOW
CREAT SERPL-MCNC: 0.78 MG/DL (ref 0.6–1.3)
EOSINOPHIL # BLD AUTO: 0.22 THOUSAND/ΜL (ref 0–0.61)
EOSINOPHIL NFR BLD AUTO: 1 % (ref 0–6)
ERYTHROCYTE [DISTWIDTH] IN BLOOD BY AUTOMATED COUNT: 13.1 % (ref 11.6–15.1)
EXT PREG TEST URINE: NEGATIVE
EXT. CONTROL ED NAV: NORMAL
GFR SERPL CREATININE-BSD FRML MDRD: 93 ML/MIN/1.73SQ M
GLUCOSE SERPL-MCNC: 122 MG/DL (ref 65–140)
GLUCOSE UR STRIP-MCNC: NEGATIVE MG/DL
HCT VFR BLD AUTO: 43.3 % (ref 34.8–46.1)
HGB BLD-MCNC: 14.3 G/DL (ref 11.5–15.4)
HGB UR QL STRIP.AUTO: ABNORMAL
IMM GRANULOCYTES # BLD AUTO: 0.43 THOUSAND/UL (ref 0–0.2)
IMM GRANULOCYTES NFR BLD AUTO: 2 % (ref 0–2)
KETONES UR STRIP-MCNC: NEGATIVE MG/DL
LEUKOCYTE ESTERASE UR QL STRIP: NEGATIVE
LYMPHOCYTES # BLD AUTO: 3.18 THOUSANDS/ΜL (ref 0.6–4.47)
LYMPHOCYTES NFR BLD AUTO: 14 % (ref 14–44)
MCH RBC QN AUTO: 28.7 PG (ref 26.8–34.3)
MCHC RBC AUTO-ENTMCNC: 33 G/DL (ref 31.4–37.4)
MCV RBC AUTO: 87 FL (ref 82–98)
MONOCYTES # BLD AUTO: 1.44 THOUSAND/ΜL (ref 0.17–1.22)
MONOCYTES NFR BLD AUTO: 6 % (ref 4–12)
MUCOUS THREADS UR QL AUTO: ABNORMAL
NEUTROPHILS # BLD AUTO: 18.23 THOUSANDS/ΜL (ref 1.85–7.62)
NEUTS SEG NFR BLD AUTO: 77 % (ref 43–75)
NITRITE UR QL STRIP: NEGATIVE
NON-SQ EPI CELLS URNS QL MICRO: ABNORMAL /HPF
NRBC BLD AUTO-RTO: 0 /100 WBCS
PH UR STRIP.AUTO: 6 [PH] (ref 4.5–8)
PLATELET # BLD AUTO: 378 THOUSANDS/UL (ref 149–390)
PMV BLD AUTO: 9.5 FL (ref 8.9–12.7)
POTASSIUM SERPL-SCNC: 3.9 MMOL/L (ref 3.5–5.3)
PROT SERPL-MCNC: 8.3 G/DL (ref 6.4–8.2)
PROT UR STRIP-MCNC: ABNORMAL MG/DL
RBC # BLD AUTO: 4.99 MILLION/UL (ref 3.81–5.12)
RBC #/AREA URNS AUTO: ABNORMAL /HPF
SODIUM SERPL-SCNC: 139 MMOL/L (ref 136–145)
SP GR UR STRIP.AUTO: >=1.03 (ref 1–1.03)
UROBILINOGEN UR QL STRIP.AUTO: 1 E.U./DL
WBC # BLD AUTO: 23.59 THOUSAND/UL (ref 4.31–10.16)
WBC #/AREA URNS AUTO: ABNORMAL /HPF

## 2021-06-30 PROCEDURE — 81001 URINALYSIS AUTO W/SCOPE: CPT

## 2021-06-30 PROCEDURE — 81025 URINE PREGNANCY TEST: CPT | Performed by: EMERGENCY MEDICINE

## 2021-06-30 PROCEDURE — 70491 CT SOFT TISSUE NECK W/DYE: CPT

## 2021-06-30 PROCEDURE — 36415 COLL VENOUS BLD VENIPUNCTURE: CPT | Performed by: EMERGENCY MEDICINE

## 2021-06-30 PROCEDURE — 99285 EMERGENCY DEPT VISIT HI MDM: CPT

## 2021-06-30 PROCEDURE — 96375 TX/PRO/DX INJ NEW DRUG ADDON: CPT

## 2021-06-30 PROCEDURE — 96365 THER/PROPH/DIAG IV INF INIT: CPT

## 2021-06-30 PROCEDURE — 96361 HYDRATE IV INFUSION ADD-ON: CPT

## 2021-06-30 PROCEDURE — 80053 COMPREHEN METABOLIC PANEL: CPT | Performed by: EMERGENCY MEDICINE

## 2021-06-30 PROCEDURE — 99285 EMERGENCY DEPT VISIT HI MDM: CPT | Performed by: EMERGENCY MEDICINE

## 2021-06-30 PROCEDURE — 85025 COMPLETE CBC W/AUTO DIFF WBC: CPT | Performed by: EMERGENCY MEDICINE

## 2021-06-30 RX ORDER — LIDOCAINE HYDROCHLORIDE 20 MG/ML
15 SOLUTION OROPHARYNGEAL ONCE
Status: COMPLETED | OUTPATIENT
Start: 2021-06-30 | End: 2021-06-30

## 2021-06-30 RX ORDER — KETOROLAC TROMETHAMINE 30 MG/ML
30 INJECTION, SOLUTION INTRAMUSCULAR; INTRAVENOUS ONCE
Status: COMPLETED | OUTPATIENT
Start: 2021-06-30 | End: 2021-06-30

## 2021-06-30 RX ORDER — DEXAMETHASONE SODIUM PHOSPHATE 4 MG/ML
10 INJECTION, SOLUTION INTRA-ARTICULAR; INTRALESIONAL; INTRAMUSCULAR; INTRAVENOUS; SOFT TISSUE ONCE
Status: COMPLETED | OUTPATIENT
Start: 2021-06-30 | End: 2021-06-30

## 2021-06-30 RX ORDER — CLINDAMYCIN PHOSPHATE 600 MG/50ML
600 INJECTION INTRAVENOUS ONCE
Status: COMPLETED | OUTPATIENT
Start: 2021-06-30 | End: 2021-07-01

## 2021-06-30 RX ADMIN — SODIUM CHLORIDE 1000 ML: 0.9 INJECTION, SOLUTION INTRAVENOUS at 21:49

## 2021-06-30 RX ADMIN — CLINDAMYCIN PHOSPHATE 600 MG: 600 INJECTION, SOLUTION INTRAVENOUS at 21:49

## 2021-06-30 RX ADMIN — DEXAMETHASONE SODIUM PHOSPHATE 10 MG: 4 INJECTION INTRA-ARTICULAR; INTRALESIONAL; INTRAMUSCULAR; INTRAVENOUS; SOFT TISSUE at 21:48

## 2021-06-30 RX ADMIN — LIDOCAINE HYDROCHLORIDE 15 ML: 20 SOLUTION ORAL; TOPICAL at 21:49

## 2021-06-30 RX ADMIN — IOHEXOL 85 ML: 350 INJECTION, SOLUTION INTRAVENOUS at 22:36

## 2021-06-30 RX ADMIN — KETOROLAC TROMETHAMINE 30 MG: 30 INJECTION, SOLUTION INTRAMUSCULAR at 21:51

## 2021-07-01 PROBLEM — J02.9 PHARYNGITIS: Status: ACTIVE | Noted: 2021-07-01

## 2021-07-01 PROBLEM — E04.1 THYROID NODULE: Status: ACTIVE | Noted: 2021-07-01

## 2021-07-01 PROBLEM — A41.9 SEPSIS (HCC): Status: ACTIVE | Noted: 2021-07-01

## 2021-07-01 PROBLEM — J36 PERITONSILLAR ABSCESS: Status: ACTIVE | Noted: 2021-07-01

## 2021-07-01 PROBLEM — B27.90 PHARYNGITIS DUE TO INFECTIOUS MONONUCLEOSIS: Status: ACTIVE | Noted: 2021-07-01

## 2021-07-01 LAB
ANION GAP SERPL CALCULATED.3IONS-SCNC: 11 MMOL/L (ref 4–13)
BASOPHILS # BLD AUTO: 0.06 THOUSANDS/ΜL (ref 0–0.1)
BASOPHILS NFR BLD AUTO: 0 % (ref 0–1)
BUN SERPL-MCNC: 15 MG/DL (ref 5–25)
CALCIUM SERPL-MCNC: 9.1 MG/DL (ref 8.3–10.1)
CHLORIDE SERPL-SCNC: 103 MMOL/L (ref 100–108)
CO2 SERPL-SCNC: 25 MMOL/L (ref 21–32)
CREAT SERPL-MCNC: 0.69 MG/DL (ref 0.6–1.3)
EOSINOPHIL # BLD AUTO: 0.04 THOUSAND/ΜL (ref 0–0.61)
EOSINOPHIL NFR BLD AUTO: 0 % (ref 0–6)
ERYTHROCYTE [DISTWIDTH] IN BLOOD BY AUTOMATED COUNT: 13.4 % (ref 11.6–15.1)
GFR SERPL CREATININE-BSD FRML MDRD: 106 ML/MIN/1.73SQ M
GLUCOSE SERPL-MCNC: 170 MG/DL (ref 65–140)
HCT VFR BLD AUTO: 42.8 % (ref 34.8–46.1)
HGB BLD-MCNC: 13.7 G/DL (ref 11.5–15.4)
IMM GRANULOCYTES # BLD AUTO: 0.45 THOUSAND/UL (ref 0–0.2)
IMM GRANULOCYTES NFR BLD AUTO: 2 % (ref 0–2)
LYMPHOCYTES # BLD AUTO: 1.24 THOUSANDS/ΜL (ref 0.6–4.47)
LYMPHOCYTES NFR BLD AUTO: 6 % (ref 14–44)
MCH RBC QN AUTO: 27.6 PG (ref 26.8–34.3)
MCHC RBC AUTO-ENTMCNC: 32 G/DL (ref 31.4–37.4)
MCV RBC AUTO: 86 FL (ref 82–98)
MONOCYTES # BLD AUTO: 0.54 THOUSAND/ΜL (ref 0.17–1.22)
MONOCYTES NFR BLD AUTO: 3 % (ref 4–12)
NEUTROPHILS # BLD AUTO: 18.51 THOUSANDS/ΜL (ref 1.85–7.62)
NEUTS SEG NFR BLD AUTO: 89 % (ref 43–75)
NRBC BLD AUTO-RTO: 0 /100 WBCS
PLATELET # BLD AUTO: 370 THOUSANDS/UL (ref 149–390)
PMV BLD AUTO: 9.7 FL (ref 8.9–12.7)
POTASSIUM SERPL-SCNC: 3.9 MMOL/L (ref 3.5–5.3)
RBC # BLD AUTO: 4.96 MILLION/UL (ref 3.81–5.12)
SODIUM SERPL-SCNC: 139 MMOL/L (ref 136–145)
WBC # BLD AUTO: 20.84 THOUSAND/UL (ref 4.31–10.16)

## 2021-07-01 PROCEDURE — 0CJS8ZZ INSPECTION OF LARYNX, VIA NATURAL OR ARTIFICIAL OPENING ENDOSCOPIC: ICD-10-PCS | Performed by: OTOLARYNGOLOGY

## 2021-07-01 PROCEDURE — 99223 1ST HOSP IP/OBS HIGH 75: CPT | Performed by: INTERNAL MEDICINE

## 2021-07-01 PROCEDURE — 80048 BASIC METABOLIC PNL TOTAL CA: CPT | Performed by: NURSE PRACTITIONER

## 2021-07-01 PROCEDURE — 85025 COMPLETE CBC W/AUTO DIFF WBC: CPT | Performed by: NURSE PRACTITIONER

## 2021-07-01 RX ORDER — MAGNESIUM HYDROXIDE/ALUMINUM HYDROXICE/SIMETHICONE 120; 1200; 1200 MG/30ML; MG/30ML; MG/30ML
30 SUSPENSION ORAL EVERY 6 HOURS PRN
Status: DISCONTINUED | OUTPATIENT
Start: 2021-07-01 | End: 2021-07-02 | Stop reason: HOSPADM

## 2021-07-01 RX ORDER — CLINDAMYCIN PHOSPHATE 600 MG/50ML
600 INJECTION INTRAVENOUS EVERY 8 HOURS
Status: DISCONTINUED | OUTPATIENT
Start: 2021-07-01 | End: 2021-07-02 | Stop reason: HOSPADM

## 2021-07-01 RX ORDER — SODIUM CHLORIDE 9 MG/ML
125 INJECTION, SOLUTION INTRAVENOUS CONTINUOUS
Status: DISCONTINUED | OUTPATIENT
Start: 2021-07-01 | End: 2021-07-01

## 2021-07-01 RX ORDER — SIMETHICONE 80 MG
80 TABLET,CHEWABLE ORAL 4 TIMES DAILY PRN
Status: DISCONTINUED | OUTPATIENT
Start: 2021-07-01 | End: 2021-07-02 | Stop reason: HOSPADM

## 2021-07-01 RX ORDER — DEXAMETHASONE SODIUM PHOSPHATE 4 MG/ML
4 INJECTION, SOLUTION INTRA-ARTICULAR; INTRALESIONAL; INTRAMUSCULAR; INTRAVENOUS; SOFT TISSUE DAILY
Status: DISCONTINUED | OUTPATIENT
Start: 2021-07-01 | End: 2021-07-02 | Stop reason: HOSPADM

## 2021-07-01 RX ORDER — SODIUM CHLORIDE 9 MG/ML
75 INJECTION, SOLUTION INTRAVENOUS CONTINUOUS
Status: DISCONTINUED | OUTPATIENT
Start: 2021-07-01 | End: 2021-07-02 | Stop reason: HOSPADM

## 2021-07-01 RX ORDER — ONDANSETRON 2 MG/ML
4 INJECTION INTRAMUSCULAR; INTRAVENOUS EVERY 6 HOURS PRN
Status: DISCONTINUED | OUTPATIENT
Start: 2021-07-01 | End: 2021-07-02 | Stop reason: HOSPADM

## 2021-07-01 RX ORDER — KETOROLAC TROMETHAMINE 30 MG/ML
15 INJECTION, SOLUTION INTRAMUSCULAR; INTRAVENOUS EVERY 6 HOURS SCHEDULED
Status: DISCONTINUED | OUTPATIENT
Start: 2021-07-01 | End: 2021-07-02 | Stop reason: HOSPADM

## 2021-07-01 RX ORDER — ACETAMINOPHEN 325 MG/1
650 TABLET ORAL EVERY 6 HOURS PRN
Status: DISCONTINUED | OUTPATIENT
Start: 2021-07-01 | End: 2021-07-02 | Stop reason: HOSPADM

## 2021-07-01 RX ADMIN — ENOXAPARIN SODIUM 40 MG: 40 INJECTION SUBCUTANEOUS at 08:13

## 2021-07-01 RX ADMIN — KETOROLAC TROMETHAMINE 15 MG: 30 INJECTION, SOLUTION INTRAMUSCULAR; INTRAVENOUS at 02:53

## 2021-07-01 RX ADMIN — DEXAMETHASONE SODIUM PHOSPHATE 4 MG: 4 INJECTION, SOLUTION INTRAMUSCULAR; INTRAVENOUS at 08:13

## 2021-07-01 RX ADMIN — CLINDAMYCIN IN 5 PERCENT DEXTROSE 600 MG: 12 INJECTION, SOLUTION INTRAVENOUS at 14:26

## 2021-07-01 RX ADMIN — CLINDAMYCIN IN 5 PERCENT DEXTROSE 600 MG: 12 INJECTION, SOLUTION INTRAVENOUS at 05:43

## 2021-07-01 RX ADMIN — SODIUM CHLORIDE 125 ML/HR: 0.9 INJECTION, SOLUTION INTRAVENOUS at 01:00

## 2021-07-01 RX ADMIN — CLINDAMYCIN IN 5 PERCENT DEXTROSE 600 MG: 12 INJECTION, SOLUTION INTRAVENOUS at 21:32

## 2021-07-01 NOTE — UTILIZATION REVIEW
Initial Clinical Review    Admission: Date/Time/Statement:   Admission Orders (From admission, onward)     Ordered        07/01/21 0018  Inpatient Admission  Once                   Orders Placed This Encounter   Procedures    Inpatient Admission     Standing Status:   Standing     Number of Occurrences:   1     Order Specific Question:   Level of Care     Answer:   Med Surg [16]     Order Specific Question:   Estimated length of stay     Answer:   More than 2 Midnights     Order Specific Question:   Certification     Answer:   I certify that inpatient services are medically necessary for this patient for a duration of greater than two midnights  See H&P and MD Progress Notes for additional information about the patient's course of treatment  ED Arrival Information     Expected Arrival Acuity    - 6/30/2021 21:06 Urgent         Means of arrival Escorted by Service Admission type    Walk-In Self Hospitalist Urgent         Arrival complaint    sore throat        Chief Complaint   Patient presents with    Sore Throat - Complicated     3rd visit for same, reports sore throat, worse on left side, states "last night i felt something pop and something was dripping in my throat"       Initial Presentation:   42 Y O female presents to ED from home, 4th visit for a  Sore throat  Was seen at  Urgent Care on   6/23, strep swab negative and  Sent home with zithromax  Came to ED  On   6/26, no improvement, treated with decadron and  Naproxen and  Mono swab positive  Returned to ED on  6/29 with persistent sore throat, again, treated with decadron, IVF and toradol and d/c home  Returns with much worse  pain  And  Neck swelling  States she felt something pop and drain in her throat, exporated pus  Met sepsis criteria with leukocytosis and tachycardia  CT scan  Shows likely  Developing abscess   Admit  IP with Sepsis,   Peritonsillar  Abscess and pharyngitis and plan is  IV  Decadron, IV  Toradol, Npo, ENT consult and DAVID      Date:   7/1   Day 2:   ENT consult  Found with  Peritonsillar phlegmon right sided, no abscess cavity noted and pharyngeal edema on right side  Thyroid nodule noted  Despite multiple attempts to locate an abscess cavity, none was found  The CT findings demonstrated phlegmon and not an actual abscess  Because of the appearance of the soft palate I felt that it would be imperative to rule this out completely  There is some edema surrounding the tonsil as well as swelling of the right pharyngeal wall  Based on inability to drain any significant mucopus I feel that she should continue with IV antibiotics and steroids for the next 24 hours before switching to oral medications  Trip to  hospitals planned for  7/2    Patient  Advised she will be  Signing AMA if she decides to go forth and leave hospital     ED Triage Vitals   Temperature Pulse Respirations Blood Pressure SpO2   06/30/21 2111 06/30/21 2111 06/30/21 2111 06/30/21 2111 06/30/21 2111   98 4 °F (36 9 °C) 95 17 135/72 99 %      Temp Source Heart Rate Source Patient Position - Orthostatic VS BP Location FiO2 (%)   07/01/21 0043 06/30/21 2111 07/01/21 0043 07/01/21 0700 --   Temporal Monitor Sitting Left arm       Pain Score       06/30/21 2111       Worst Possible Pain          Wt Readings from Last 1 Encounters:   07/01/21 89 7 kg (197 lb 12 oz)     Additional Vital Signs:   96 8 °F (36 °C)Abnormal   80  18  111/64  97 %  None (Room air)  Sitting     07/01/21 0043  97 1 °F (36 2 °C)Abnormal   87  20  113/71  97 %  None (Room air)  Sitting   07/01/21 0021  98 4 °F (36 9 °C)  92  18  132/80  99 %  --  --   06/30/21 2111  98 4 °F (36 9 °C)  95  17  135/72  99 %  --           Pertinent Labs/Diagnostic Test Results:   Ct  Soft tissue of neck ( 6/30)    Marked enlargement of the right palatine tonsil with surrounding phlegmonous changes and adjacent right pharyngeal and parapharyngeal edema tracking to the level of the piriform sinuses   The findings are likely infectious in nature  Yao Whittington is a small   focus of hypodensity within this enlarged palatine tonsil suspicious for a developing abscess   The oropharyngeal airway is mildly deviated to the left  Mild right cervical lymphadenopathy likely reactive in nature  1 9 cm left thyroid lobe nodule   A nonemergent thyroid ultrasound is recommended for further characterization         Results from last 7 days   Lab Units 07/01/21  0438 06/30/21  2150   WBC Thousand/uL 20 84* 23 59*   HEMOGLOBIN g/dL 13 7 14 3   HEMATOCRIT % 42 8 43 3   PLATELETS Thousands/uL 370 378   NEUTROS ABS Thousands/µL 18 51* 18 23*         Results from last 7 days   Lab Units 07/01/21  0438 06/30/21  2150   SODIUM mmol/L 139 139   POTASSIUM mmol/L 3 9 3 9   CHLORIDE mmol/L 103 103   CO2 mmol/L 25 30   ANION GAP mmol/L 11 6   BUN mg/dL 15 17   CREATININE mg/dL 0 69 0 78   EGFR ml/min/1 73sq m 106 93   CALCIUM mg/dL 9 1 9 2     Results from last 7 days   Lab Units 06/30/21  2150   AST U/L 18   ALT U/L 28   ALK PHOS U/L 96   TOTAL PROTEIN g/dL 8 3*   ALBUMIN g/dL 3 7   TOTAL BILIRUBIN mg/dL 0 79         Results from last 7 days   Lab Units 07/01/21  0438 06/30/21  2150   GLUCOSE RANDOM mg/dL 170* 122           Results from last 7 days   Lab Units 06/30/21  2149   CLARITY UA  Clear   COLOR UA  Yellow   SPEC GRAV UA  >=1 030   PH UA  6 0   GLUCOSE UA mg/dl Negative   KETONES UA mg/dl Negative   BLOOD UA  Large*   PROTEIN UA mg/dl Trace*   NITRITE UA  Negative   BILIRUBIN UA  Negative   UROBILINOGEN UA E U /dl 1 0   LEUKOCYTES UA  Negative   WBC UA /hpf 2-4   RBC UA /hpf 10-20*   BACTERIA UA /hpf Innumerable*   EPITHELIAL CELLS WET PREP /hpf Occasional   MUCUS THREADS  Moderate*         ED Treatment:   Medication Administration from 06/30/2021 2106 to 07/01/2021 0035       Date/Time Order Dose Route Action Comments     06/30/2021 2149 sodium chloride 0 9 % bolus 1,000 mL 1,000 mL Intravenous New Bag      06/30/2021 2151 ketorolac (TORADOL) injection 30 mg 30 mg Intravenous Given      06/30/2021 2148 dexamethasone (DECADRON) injection 10 mg 10 mg Intravenous Given      06/30/2021 2230 clindamycin (CLEOCIN) IVPB (premix in dextrose) 600 mg 50 mL 0 mg Intravenous Stopped      06/30/2021 2149 clindamycin (CLEOCIN) IVPB (premix in dextrose) 600 mg 50 mL 600 mg Intravenous New Bag      06/30/2021 2149 Lidocaine Viscous HCl (XYLOCAINE) 2 % mucosal solution 15 mL 15 mL Swish & Swallow Given      06/30/2021 2236 iohexol (OMNIPAQUE) 350 MG/ML injection (SINGLE-DOSE) 85 mL 85 mL Intravenous Given         History reviewed  No pertinent past medical history  Present on Admission:   Pharyngitis   Peritonsillar abscess   Sepsis ([facility] [address] )   Thyroid nodule      Admitting Diagnosis: Leukocytosis [D72 829]  Sore throat [J02 9]  Throat infection [J02 9]  Tonsillitis [J03 90]  Tonsillar abscess [J36]  Age/Sex: 42 y o  female  Admission Orders:  Scheduled Medications:  clindamycin, 600 mg, Intravenous, Q8H  dexamethasone, 4 mg, Intravenous, Daily  enoxaparin, 40 mg, Subcutaneous, Daily  ketorolac, 15 mg, Intravenous, Q6H [address]      Continuous IV Infusions:  sodium chloride, 75 mL/hr, Intravenous, Continuous      PRN Meds:  acetaminophen, 650 mg, Oral, Q6H PRN  aluminum-magnesium hydroxide-simethicone, 30 mL, Oral, Q6H PRN  ondansetron, 4 mg, Intravenous, Q6H PRN  simethicone, 80 mg, Oral, 4x Daily PRN        IP CONSULT TO ENT    Network Utilization Review Department  ATTENTION: Please call with any questions or concerns to [phone] and carefully listen to the prompts so that you are directed to the right person  All voicemails are confidential   Zhen Crowley all requests for admission clinical reviews, approved or denied determinations and any other requests to dedicated fax number below belonging to the campus where the patient is receiving treatment   List of dedicated fax numbers for the Facilities:  FACILITY NAME UR FAX NUMBER   ADMISSION DENIALS (Administrative/Medical Necessity) [phone]   [address] (Maternity/NICU/Pediatrics) [address] [place] [address],[address] Floor [facility] [address] [place] [address]  [phone]   [id number] [place] [address] Darryl [address] [facility] [place] [address] [phone]

## 2021-07-01 NOTE — ED NOTES
Patient transported to [facility] Justus Villa RN  06/30/21 0750

## 2021-07-01 NOTE — ASSESSMENT & PLAN NOTE
· Seen in ED and tested for mono and Strep A which resulted negative    Throat culture grew beta-hemolytic strep NOT group A/C/G   · EBV VCA IgG positive  · Treated outpatient with azithromycin and Decadron  · See above plan for tonsillar abscess

## 2021-07-01 NOTE — ASSESSMENT & PLAN NOTE
· Incidental finding on CT of 1 9cm left thyroid lobe nodule    Nonemergent thyroid US recommended  · Outpatient follow-up with PMD

## 2021-07-01 NOTE — H&P
[address] [facility]  H&P- [facility] Dick 1975, 42 y o  female MRN: [id number]  Unit/Bed#: E2 -01 Encounter: [id number]  Primary Care Provider: Olaf Waldrop MD   Date and time admitted to hospital: 6/30/2021  9:10 PM    * Sepsis [facility])  Assessment & Plan  · Meets sepsis criteria with leukocytosis and tachycardia; see plan for peritonsillar abscess    Peritonsillar abscess  Assessment & Plan  · Third ED visit with complaint of sore throat  CT imaging shows Marked enlargement of the right palatine tonsil with surrounding phlegmonous changes and adjacent right pharyngeal and parapharyngeal edema tracking to the level of the piriform sinuses  The findings are likely infectious in nature  Small focus of hypodensity within this enlarged palatine tonsil suspicious for a developing abscess  The oropharyngeal airway is mildly deviated to the left  · Allergy to penicillin  Will treat with IV clindamycin  · IV Decadron  · IV Toradol  · NPO  · ENT consult    Pharyngitis  Assessment & Plan  · Seen in ED and tested for mono and Strep A which resulted negative  Throat culture grew beta-hemolytic strep NOT group A/C/G   · EBV VCA IgG positive  · Treated outpatient with azithromycin and Decadron  · See above plan for tonsillar abscess    Thyroid nodule  Assessment & Plan  · Incidental finding on CT of 1 9cm left thyroid lobe nodule  Nonemergent thyroid US recommended  · Outpatient follow-up with PMD    VTE Prophylaxis: Enoxaparin (Lovenox)  / reason for no mechanical VTE prophylaxis ac   Code Status: FC  POLST: POLST is not applicable to this patient  Discussion with family:     Anticipated Length of Stay:  Patient will be admitted on an Inpatient basis with an anticipated length of stay of  > 2 midnights  Justification for Hospital Stay: sepsis due to pharyngitis with peritonsillar abscess    Total Time for Visit, including Counseling / Coordination of Care: 60 minutes    Greater than 50% of this total time spent on direct patient counseling and coordination of care  Chief Complaint:   "Throat infection"    History of Present Illness:    Stephanie Pleitez is a 42 y o  female who presents with c/o sore throat and right neck swelling  Reports symptoms present since last Wednesday  Was seen in ED June 26 and given Rx for Decadron and azithromycin; states she completed treatment but symptoms did not improve  Last night when she went to bed she felt something in her throat pop and drain, states she spit out pus and swelling and pain improved although today symptoms and swelling returned  Denies fevers  Review of Systems:    Review of Systems   Constitutional: Negative  HENT: Positive for ear pain, sore throat and trouble swallowing  Respiratory: Negative  Cardiovascular: Negative  Gastrointestinal: Negative  Genitourinary: Negative  Musculoskeletal: Negative  Skin: Negative  Neurological: Negative  Psychiatric/Behavioral: Negative  Past Medical and Surgical History:     History reviewed  No pertinent past medical history  Past Surgical History:   Procedure Laterality Date    TUBAL LIGATION      WISDOM TOOTH EXTRACTION         Meds/Allergies:    Prior to Admission medications    Medication Sig Start Date End Date Taking?  Authorizing Provider   acetaminophen (TYLENOL) 650 mg CR tablet Take 1 tablet (650 mg total) by mouth every 8 (eight) hours as needed for mild pain 6/26/21   Ashley Samuel PA-C   cholecalciferol 2000 units TABS Take 1 tablet (2,000 Units total) by mouth daily 2/7/19   Sara Correa MD   clotrimazole-betamethasone (LOTRISONE) 1-0 05 % cream Apply topically 2 (two) times a day 6/17/20   NAM Mishra DO   HYDROcodone-acetaminophen [facility]) 5-325 mg per tablet Take 1 tablet by mouth every 4 (four) hours as needed for pain for up to 10 daysMax Daily Amount: 6 tablets 6/29/21 7/9/21  Anne Spine,    naproxen (NAPROSYN) 500 mg tablet Take 1 tablet (500 mg total) by mouth 2 (two) times a day as needed for mild pain or moderate pain 6/26/21   Odalys Judge PA-C     I have reviewed home medications with patient personally  Allergies:    Allergies   Allergen Reactions    Penicillins Swelling       Social History:     Marital Status: Single   Occupation: pharmaceutical  Patient Pre-hospital Living Situation:  Resides at home with spouse and children  Patient Pre-hospital Level of Mobility:  Ambulatory  Patient Pre-hospital Diet Restrictions:   Substance Use History:   Social History     Substance and Sexual Activity   Alcohol Use No     Social History     Tobacco Use   Smoking Status Never Smoker   Smokeless Tobacco Never Used     Social History     Substance and Sexual Activity   Drug Use No       Family History:    Family History   Problem Relation Age of Onset    Diabetes Mother     Breast cancer Mother 68    No Known Problems Sister     Breast cancer Maternal Aunt 71    Breast cancer Maternal Aunt 72    Breast cancer Maternal Aunt 58    Breast cancer Maternal Aunt 54    No Known Problems Maternal Aunt     No Known Problems Maternal Aunt     No Known Problems Sister     No Known Problems Sister     Breast cancer Paternal Aunt         unknown age   [place] No Known Problems Paternal Aunt     No Known Problems Paternal Aunt     No Known Problems Paternal Aunt     No Known Problems Paternal Aunt     No Known Problems Father     No Known Problems Daughter     No Known Problems Maternal Grandmother     No Known Problems Paternal Grandmother     No Known Problems Paternal Grandfather     No Known Problems Paternal Aunt     No Known Problems Paternal Aunt        Physical Exam:     Vitals:   Blood Pressure: 113/71 (07/01/21 0043)  Pulse: 87 (07/01/21 0043)  Temperature: (!) 97 1 °F (36 2 °C) (07/01/21 0043)  Temp Source: Temporal (07/01/21 0043)  Respirations: 20 (07/01/21 0043)  Height: 5' 7" (170 2 cm) (07/01/21 8118)  Weight - Scale: 89 7 kg (197 lb 12 oz) (07/01/21 0043)  SpO2: 97 % (07/01/21 0043)    Physical Exam  Constitutional:       General: She is not in acute distress  Appearance: Normal appearance  She is not ill-appearing, toxic-appearing or diaphoretic  HENT:      Head: Normocephalic and atraumatic  Comments: Tonsillar lymphadenopathy on right     Mouth/Throat:      Mouth: Mucous membranes are moist       Pharynx: Oropharyngeal exudate present  Comments: Right tonsillar exudate, abscess, swelling  Eyes:      Conjunctiva/sclera: Conjunctivae normal    Cardiovascular:      Rate and Rhythm: Normal rate and regular rhythm  Heart sounds: Normal heart sounds  Pulmonary:      Effort: Pulmonary effort is normal       Breath sounds: Normal breath sounds  Abdominal:      General: Bowel sounds are normal       Palpations: Abdomen is soft  Musculoskeletal:      Right lower leg: No edema  Left lower leg: No edema  Skin:     General: Skin is warm and dry  Coloration: Skin is not pale  Neurological:      General: No focal deficit present  Mental Status: She is alert and oriented to person, place, and time  Psychiatric:         Mood and Affect: Mood normal          Behavior: Behavior normal          Thought Content: Thought content normal          Judgment: Judgment normal       Comments: Pleasant     Additional Data:     Lab Results: I have personally reviewed pertinent reports        Results from last 7 days   Lab Units 06/30/21  2150   WBC Thousand/uL 23 59*   HEMOGLOBIN g/dL 14 3   HEMATOCRIT % 43 3   PLATELETS Thousands/uL 378   NEUTROS PCT % 77*   LYMPHS PCT % 14   MONOS PCT % 6   EOS PCT % 1     Results from last 7 days   Lab Units 06/30/21  2150   SODIUM mmol/L 139   POTASSIUM mmol/L 3 9   CHLORIDE mmol/L 103   CO2 mmol/L 30   BUN mg/dL 17   CREATININE mg/dL 0 78   ANION GAP mmol/L 6   CALCIUM mg/dL 9 2   ALBUMIN g/dL 3 7   TOTAL BILIRUBIN mg/dL 0 79   ALK PHOS U/L 96   ALT U/L 28   AST U/L 18   GLUCOSE RANDOM mg/dL 122                       Imaging: I have personally reviewed pertinent reports  CT soft tissue neck   Final Result by Krissy Fenton MD (06/30 2318)      Marked enlargement of the right palatine tonsil with surrounding phlegmonous changes and adjacent right pharyngeal and parapharyngeal edema tracking to the level of the piriform sinuses  The findings are likely infectious in nature  There is a small    focus of hypodensity within this enlarged palatine tonsil suspicious for a developing abscess  The oropharyngeal airway is mildly deviated to the left  Mild right cervical lymphadenopathy likely reactive in nature  1 9 cm left thyroid lobe nodule  A nonemergent thyroid ultrasound is recommended for further characterization  Workstation performed: [id number]             EKG, Pathology, and Other Studies Reviewed on Admission:   · ct*    Allscripts / Epic Records Reviewed: Yes     ** Please Note: This note has been constructed using a voice recognition system   **

## 2021-07-01 NOTE — ED PROVIDER NOTES
History  Chief Complaint   Patient presents with    Sore Throat - Complicated     3rd visit for same, reports sore throat, worse on left side, states "last night i felt something pop and something was dripping in my throat"     43 yo female presents for 4th visit for sore throat  Pt originally seen 6/23 at [facility] for sore throat - tested neg for strep but treated with zithromax  Came in here 6/26 and not feeling much better, given naproxen and decadron and was swabbed for mono which came back positive  Pt returned yesterday with continued complaint of sore throat and was educated on her mono results  Given IVF, decadron and toradol with relief and discharged home  Pt states pain now much worse on right side and last night she felt something pop and drain  History provided by:  Patient   used: No    Sore Throat  Location:  Generalized (R>L)  Quality:  Aching  Severity:  Moderate  Onset quality:  Gradual  Duration:  1 week  Timing:  Constant  Progression:  Worsening  Chronicity:  New  Relieved by:  Nothing  Worsened by:  Swallowing  Ineffective treatments: naproxen, decadron, narcotic  Associated symptoms: adenopathy, chills, fever, headaches, trouble swallowing and voice change    Associated symptoms: no abdominal pain, no chest pain, no neck stiffness, no rash and no shortness of breath        Prior to Admission Medications   Prescriptions Last Dose Informant Patient Reported? Taking?    HYDROcodone-acetaminophen (NORCO) 5-325 mg per tablet   No No   Sig: Take 1 tablet by mouth every 4 (four) hours as needed for pain for up to 10 daysMax Daily Amount: 6 tablets   acetaminophen (TYLENOL) 650 mg CR tablet   No No   Sig: Take 1 tablet (650 mg total) by mouth every 8 (eight) hours as needed for mild pain   cholecalciferol 2000 units TABS   No No   Sig: Take 1 tablet (2,000 Units total) by mouth daily   clotrimazole-betamethasone (LOTRISONE) 1-0 05 % cream   No No   Sig: Apply topically 2 (two) times a day   naproxen (NAPROSYN) 500 mg tablet   No No   Sig: Take 1 tablet (500 mg total) by mouth 2 (two) times a day as needed for mild pain or moderate pain      Facility-Administered Medications: None       History reviewed  No pertinent past medical history  Past Surgical History:   Procedure Laterality Date    TUBAL LIGATION      WISDOM TOOTH EXTRACTION         Family History   Problem Relation Age of Onset    Diabetes Mother     Breast cancer Mother 68    No Known Problems Sister     Breast cancer Maternal Aunt 71    Breast cancer Maternal Aunt 72    Breast cancer Maternal Aunt 58    Breast cancer Maternal Aunt 54    No Known Problems Maternal Aunt     No Known Problems Maternal Aunt     No Known Problems Sister     No Known Problems Sister     Breast cancer Paternal Aunt         unknown age   [facility] No Known Problems Paternal Aunt     No Known Problems Paternal Aunt     No Known Problems Paternal Aunt     No Known Problems Paternal Aunt     No Known Problems Father     No Known Problems Daughter     No Known Problems Maternal Grandmother     No Known Problems Paternal Grandmother     No Known Problems Paternal Grandfather     No Known Problems Paternal Aunt     No Known Problems Paternal Aunt      I have reviewed and agree with the history as documented  E-Cigarette/Vaping    E-Cigarette Use Never User      E-Cigarette/Vaping Substances    Nicotine No     THC No     CBD No     Flavoring No      Social History     Tobacco Use    Smoking status: Never Smoker    Smokeless tobacco: Never Used   Vaping Use    Vaping Use: Never used   Substance Use Topics    Alcohol use: No    Drug use: No       Review of Systems   Constitutional: Positive for chills, fatigue and fever  Negative for appetite change  HENT: Positive for sore throat, trouble swallowing and voice change  Eyes: Negative for visual disturbance  Respiratory: Negative for shortness of breath  Cardiovascular: Negative for chest pain  Gastrointestinal: Negative for abdominal pain, diarrhea, nausea and vomiting  Genitourinary: Negative for dysuria, frequency, vaginal bleeding and vaginal discharge  Musculoskeletal: Negative for back pain, neck pain and neck stiffness  Skin: Negative for pallor and rash  Allergic/Immunologic: Negative for immunocompromised state  Neurological: Positive for headaches  Negative for light-headedness  Hematological: Positive for adenopathy  Psychiatric/Behavioral: Negative for confusion  All other systems reviewed and are negative  Physical Exam  Physical Exam  Vitals and nursing note reviewed  Constitutional:       General: She is not in acute distress  Appearance: She is well-developed  HENT:      Head: Normocephalic and atraumatic  Right Ear: External ear normal       Left Ear: External ear normal       Mouth/Throat:      Mouth: Mucous membranes are moist       Pharynx: Pharyngeal swelling, oropharyngeal exudate and posterior oropharyngeal erythema present  Comments: Uvular deviation to R, large R sided peritonsillar abscess noted   Cardiovascular:      Rate and Rhythm: Normal rate and regular rhythm  Heart sounds: No murmur heard  Pulmonary:      Effort: Pulmonary effort is normal  No respiratory distress  Breath sounds: Normal breath sounds  Abdominal:      General: Bowel sounds are normal       Palpations: Abdomen is soft  Musculoskeletal:         General: Normal range of motion  Cervical back: Neck supple  Lymphadenopathy:      Cervical: Cervical adenopathy (R>L) present  Skin:     General: Skin is warm  Coloration: Skin is not pale  Findings: No rash  Neurological:      Mental Status: She is alert and oriented to person, place, and time     Psychiatric:         Behavior: Behavior normal          Vital Signs  ED Triage Vitals   Temperature Pulse Respirations Blood Pressure SpO2   06/30/21 2111 06/30/21 2111 06/30/21 2111 06/30/21 2111 06/30/21 2111   98 4 °F (36 9 °C) 95 17 135/72 99 %      Temp Source Heart Rate Source Patient Position - Orthostatic VS BP Location FiO2 (%)   07/01/21 0043 06/30/21 2111 07/01/21 0043 -- --   Temporal Monitor Sitting        Pain Score       06/30/21 2111       Worst Possible Pain           Vitals:    06/30/21 2111 07/01/21 0021 07/01/21 0043   BP: 135/72 132/80 113/71   Pulse: 95 92 87   Patient Position - Orthostatic VS:   Sitting         Visual Acuity      ED Medications  Medications   acetaminophen (TYLENOL) tablet 650 mg (has no administration in time range)   sodium chloride 0 9 % infusion (75 mL/hr Intravenous Rate/Dose Change 7/1/21 0251)   enoxaparin (LOVENOX) subcutaneous injection 40 mg (has no administration in time range)   aluminum-magnesium hydroxide-simethicone (MYLANTA) oral suspension 30 mL (has no administration in time range)   simethicone (MYLICON) chewable tablet 80 mg (has no administration in time range)   ondansetron (ZOFRAN) injection 4 mg (has no administration in time range)   ketorolac (TORADOL) injection 15 mg (15 mg Intravenous Given 7/1/21 0253)   dexamethasone (DECADRON) injection 4 mg (has no administration in time range)   clindamycin (CLEOCIN) IVPB (premix in dextrose) 600 mg 50 mL (has no administration in time range)   sodium chloride 0 9 % bolus 1,000 mL (0 mL Intravenous Stopped 7/1/21 0048)   ketorolac (TORADOL) injection 30 mg (30 mg Intravenous Given 6/30/21 2151)   dexamethasone (DECADRON) injection 10 mg (10 mg Intravenous Given 6/30/21 2148)   clindamycin (CLEOCIN) IVPB (premix in dextrose) 600 mg 50 mL (0 mg Intravenous Stopped 6/30/21 2230)   Lidocaine Viscous HCl (XYLOCAINE) 2 % mucosal solution 15 mL (15 mL Swish & Swallow Given 6/30/21 2149)   iohexol (OMNIPAQUE) 350 MG/ML injection (SINGLE-DOSE) 85 mL (85 mL Intravenous Given 6/30/21 3408)       Diagnostic Studies  Results Reviewed     Procedure Component Value Units Date/Time    Comprehensive metabolic panel [[id number]]  (Abnormal) Collected: 06/30/21 2150    Lab Status: Final result Specimen: Blood from Arm, Right Updated: 06/30/21 2215     Sodium 139 mmol/L      Potassium 3 9 mmol/L      Chloride 103 mmol/L      CO2 30 mmol/L      ANION GAP 6 mmol/L      BUN 17 mg/dL      Creatinine 0 78 mg/dL      Glucose 122 mg/dL      Calcium 9 2 mg/dL      AST 18 U/L      ALT 28 U/L      Alkaline Phosphatase 96 U/L      Total Protein 8 3 g/dL      Albumin 3 7 g/dL      Total Bilirubin 0 79 mg/dL      eGFR 93 ml/min/1 73sq m     Narrative:      National Kidney Disease Foundation guidelines for Chronic Kidney Disease (CKD):     Stage 1 with normal or high GFR (GFR > 90 mL/min/1 73 square meters)    Stage 2 Mild CKD (GFR = 60-89 mL/min/1 73 square meters)    Stage 3A Moderate CKD (GFR = 45-59 mL/min/1 73 square meters)    Stage 3B Moderate CKD (GFR = 30-44 mL/min/1 73 square meters)    Stage 4 Severe CKD (GFR = 15-29 mL/min/1 73 square meters)    Stage 5 End Stage CKD (GFR <15 mL/min/1 73 square meters)  Note: GFR calculation is accurate only with a steady state creatinine    Urine Microscopic [[id number]]  (Abnormal) Collected: 06/30/21 2149    Lab Status: Final result Specimen: Urine, Clean Catch Updated: 06/30/21 2214     RBC, UA 10-20 /hpf      WBC, UA 2-4 /hpf      Epithelial Cells Occasional /hpf      Bacteria, UA Innumerable /hpf      MUCUS THREADS Moderate    CBC and differential [[id number]]  (Abnormal) Collected: 06/30/21 2150    Lab Status: Final result Specimen: Blood from Arm, Right Updated: 06/30/21 2159     WBC 23 59 Thousand/uL      RBC 4 99 Million/uL      Hemoglobin 14 3 g/dL      Hematocrit 43 3 %      MCV 87 fL      MCH 28 7 pg      MCHC 33 0 g/dL      RDW 13 1 %      MPV 9 5 fL      Platelets 631 Thousands/uL      nRBC 0 /100 WBCs      Neutrophils Relative 77 %      Immat GRANS % 2 %      Lymphocytes Relative 14 %      Monocytes Relative 6 %      Eosinophils Relative 1 %      Basophils Relative 0 %      Neutrophils Absolute 18 23 Thousands/µL      Immature Grans Absolute 0 43 Thousand/uL      Lymphocytes Absolute 3 18 Thousands/µL      Monocytes Absolute 1 44 Thousand/µL      Eosinophils Absolute 0 22 Thousand/µL      Basophils Absolute 0 09 Thousands/µL     POCT pregnancy, urine [[id number]]  (Normal) Resulted: 06/30/21 2152    Lab Status: Final result Updated: 06/30/21 2152     EXT PREG TEST UR (Ref: Negative) negative     Control valid    Urine Macroscopic, POC [[id number]]  (Abnormal) Collected: 06/30/21 2149    Lab Status: Final result Specimen: Urine Updated: 06/30/21 2151     Color, UA Yellow     Clarity, UA Clear     pH, UA 6 0     Leukocytes, UA Negative     Nitrite, UA Negative     Protein, UA Trace mg/dl      Glucose, UA Negative mg/dl      Ketones, UA Negative mg/dl      Urobilinogen, UA 1 0 E U /dl      Bilirubin, UA Negative     Blood, UA Large     Specific Gravity, UA >=1 030    Narrative:      CLINITEK RESULT                 CT soft tissue neck   Final Result by Krissy Fenton MD (06/30 2318)      Marked enlargement of the right palatine tonsil with surrounding phlegmonous changes and adjacent right pharyngeal and parapharyngeal edema tracking to the level of the piriform sinuses  The findings are likely infectious in nature  There is a small    focus of hypodensity within this enlarged palatine tonsil suspicious for a developing abscess  The oropharyngeal airway is mildly deviated to the left  Mild right cervical lymphadenopathy likely reactive in nature  1 9 cm left thyroid lobe nodule  A nonemergent thyroid ultrasound is recommended for further characterization  Workstation performed: [id number]                    Procedures  Procedures         ED Course  ED Course as of Jul 01 0345 Wed Jun 30, 2021   2115 Pt seen and examined  43 yo female presents for 4th visit for sore throat   Pt originally seen 6/23 at [facility] for sore throat - tested neg for strep but treated with zithromax  Came in here 6/26 and not feeling much better, given naproxen and decadron and was swabbed for mono which came back positive  Pt returned yesterday with continued complaint of sore throat and was educated on her mono results  Given IVF, decadron and toradol with relief and discharged home  Pt states pain now much worse on right side and last night she felt something pop and drain  On exam likely has large R sided peritonsillar abscess  Will give IVF, toradol, decadron, lidocaine swish and swallow and morphine, clindamycin and CT soft tissue neck  2207 WBC 23 59  Looking in records mono NEG, throat cx showed 3+ Growth of Beta Hemolytic Streptococcus NOT Group A, C, or G       2320 CT shows Marked enlargement of the right palatine tonsil with surrounding phlegmonous changes and adjacent right pharyngeal and parapharyngeal edema tracking to the level of the piriform sinuses  The findings are likely infectious in nature  There is a small   focus of hypodensity within this enlarged palatine tonsil suspicious for a developing abscess  The oropharyngeal airway is mildly deviated to the left      Mild right cervical lymphadenopathy likely reactive in nature      1 9 cm left thyroid lobe nodule  A nonemergent thyroid ultrasound is recommended for further characterization  Initial Sepsis Screening     Row Name 07/01/21 0025                Is the patient's history suggestive of a new or worsening infection? (!) Yes (Proceed)  -        Suspected source of infection  -- pharyngitis/peritonsillar abscess  -        Are two or more of the following signs & symptoms of infection both present and new to the patient?   (!) Yes (Proceed)  -        Indicate SIRS criteria  Tachycardia > 90 bpm;Leukocytosis (WBC > 84353 IJL)  -        If the answer is yes to both questions, suspicion of sepsis is present  --        If severe sepsis is present AND tissue hypoperfusion perists in the hour after fluid resuscitation or lactate > 4, the patient meets criteria for SEPTIC SHOCK  --        Are any of the following organ dysfunction criteria present within 6 hours of suspected infection and SIRS criteria that are NOT considered to be chronic conditions? No  -        Organ dysfunction  --        Date of presentation of severe sepsis  07/01/21  -        Time of presentation of severe sepsis  0026  -        Tissue hypoperfusion persists in the hour after crystalloid fluid administration, evidenced, by either:  --        Was hypotension present within one hour of the conclusion of crystalloid fluid administration?  --        Date of presentation of septic shock  --        Time of presentation of septic shock  --          User Key  (r) = Recorded By, (t) = Taken By, (c) = Cosigned By    [address] Name Provider Type    [facility] Nurse Practitioner                        MDM    Disposition  Final diagnoses: Tonsillitis   Tonsillar abscess   Throat infection   Leukocytosis     Time reflects when diagnosis was documented in both MDM as applicable and the Disposition within this note     Time User Action Codes Description Comment    7/1/2021 12:17 AM Radha Band M Add [J03 90] Tonsillitis     7/1/2021 12:17 AM Radha Band M Add [J36] Tonsillar abscess     7/1/2021 12:17 AM Radha Band M Add [J02 9] Throat infection     7/1/2021 12:17 AM Radha Band M Add [D72 829] Leukocytosis     7/1/2021  1:34 AM [place] Night Add [J36] Peritonsillar abscess     7/1/2021  1:35 AM [place] Night Add [J02 9] Pharyngitis       ED Disposition     ED Disposition Condition Date/Time Comment    Admit Stable u Jul 1, 2021 12:17 AM Case was discussed with Brittany Andrade NP and the patient's admission status was agreed to be Admission Status: inpatient status to the service of Dr Tati Hilario           Follow-up Information    None         Current Discharge Medication List      CONTINUE these medications which have NOT CHANGED    Details   acetaminophen (TYLENOL) 650 mg CR tablet Take 1 tablet (650 mg total) by mouth every 8 (eight) hours as needed for mild pain  Qty: 30 tablet, Refills: 0    Associated Diagnoses: Pharyngitis      cholecalciferol 2000 units TABS Take 1 tablet (2,000 Units total) by mouth daily  Qty: 90 tablet, Refills: 3    Associated Diagnoses: Vitamin D deficiency      clotrimazole-betamethasone (LOTRISONE) 1-0 05 % cream Apply topically 2 (two) times a day  Qty: 30 g, Refills: 2    Associated Diagnoses: Tinea corporis      HYDROcodone-acetaminophen (NORCO) 5-325 mg per tablet Take 1 tablet by mouth every 4 (four) hours as needed for pain for up to 10 daysMax Daily Amount: 6 tablets  Qty: 10 tablet, Refills: 0    Associated Diagnoses: Gammaherpesviral mononucleosis without complication      naproxen (NAPROSYN) 500 mg tablet Take 1 tablet (500 mg total) by mouth 2 (two) times a day as needed for mild pain or moderate pain  Qty: 30 tablet, Refills: 0    Associated Diagnoses: Pharyngitis           No discharge procedures on file      PDMP Review     None          ED Provider  Electronically Signed by           Florence Wagner DO  07/01/21 9363

## 2021-07-01 NOTE — ASSESSMENT & PLAN NOTE
· Third ED visit with complaint of sore throat  CT imaging shows Marked enlargement of the right palatine tonsil with surrounding phlegmonous changes and adjacent right pharyngeal and parapharyngeal edema tracking to the level of the piriform sinuses  The findings are likely infectious in nature  Small focus of hypodensity within this enlarged palatine tonsil suspicious for a developing abscess  The oropharyngeal airway is mildly deviated to the left  · Allergy to penicillin    Will treat with IV clindamycin  · IV Decadron  · IV Toradol  · NPO  · ENT consult

## 2021-07-01 NOTE — PLAN OF CARE
Problem: INFECTION - ADULT  Goal: Absence or prevention of progression during hospitalization  Description: INTERVENTIONS:  - Assess and monitor for signs and symptoms of infection  - Monitor lab/diagnostic results  - Monitor all insertion sites, i e  indwelling lines, tubes, and drains  - Monitor endotracheal if appropriate and nasal secretions for changes in amount and color  - [place] appropriate cooling/warming therapies per order  - Administer medications as ordered  - Instruct and encourage patient and family to use good hand hygiene technique  - Identify and instruct in appropriate isolation precautions for identified infection/condition  Outcome: Progressing  Goal: Absence of fever/infection during neutropenic period  Description: INTERVENTIONS:  - Monitor WBC    Outcome: Progressing

## 2021-07-02 VITALS
RESPIRATION RATE: 18 BRPM | BODY MASS INDEX: 31.04 KG/M2 | HEART RATE: 73 BPM | TEMPERATURE: 97.5 F | DIASTOLIC BLOOD PRESSURE: 76 MMHG | OXYGEN SATURATION: 100 % | HEIGHT: 67 IN | SYSTOLIC BLOOD PRESSURE: 124 MMHG | WEIGHT: 197.75 LBS

## 2021-07-02 LAB
BASOPHILS # BLD AUTO: 0.03 THOUSANDS/ΜL (ref 0–0.1)
BASOPHILS NFR BLD AUTO: 0 % (ref 0–1)
EOSINOPHIL # BLD AUTO: 0.04 THOUSAND/ΜL (ref 0–0.61)
EOSINOPHIL NFR BLD AUTO: 0 % (ref 0–6)
ERYTHROCYTE [DISTWIDTH] IN BLOOD BY AUTOMATED COUNT: 13.3 % (ref 11.6–15.1)
HCT VFR BLD AUTO: 43 % (ref 34.8–46.1)
HGB BLD-MCNC: 13.7 G/DL (ref 11.5–15.4)
IMM GRANULOCYTES # BLD AUTO: 0.21 THOUSAND/UL (ref 0–0.2)
IMM GRANULOCYTES NFR BLD AUTO: 1 % (ref 0–2)
LYMPHOCYTES # BLD AUTO: 3.77 THOUSANDS/ΜL (ref 0.6–4.47)
LYMPHOCYTES NFR BLD AUTO: 24 % (ref 14–44)
MCH RBC QN AUTO: 27.5 PG (ref 26.8–34.3)
MCHC RBC AUTO-ENTMCNC: 31.9 G/DL (ref 31.4–37.4)
MCV RBC AUTO: 86 FL (ref 82–98)
MONOCYTES # BLD AUTO: 0.66 THOUSAND/ΜL (ref 0.17–1.22)
MONOCYTES NFR BLD AUTO: 4 % (ref 4–12)
NEUTROPHILS # BLD AUTO: 10.77 THOUSANDS/ΜL (ref 1.85–7.62)
NEUTS SEG NFR BLD AUTO: 71 % (ref 43–75)
NRBC BLD AUTO-RTO: 0 /100 WBCS
PLATELET # BLD AUTO: 396 THOUSANDS/UL (ref 149–390)
PMV BLD AUTO: 9.4 FL (ref 8.9–12.7)
RBC # BLD AUTO: 4.99 MILLION/UL (ref 3.81–5.12)
WBC # BLD AUTO: 15.48 THOUSAND/UL (ref 4.31–10.16)

## 2021-07-02 PROCEDURE — 99239 HOSP IP/OBS DSCHRG MGMT >30: CPT | Performed by: INTERNAL MEDICINE

## 2021-07-02 PROCEDURE — 85025 COMPLETE CBC W/AUTO DIFF WBC: CPT | Performed by: INTERNAL MEDICINE

## 2021-07-02 RX ORDER — CLINDAMYCIN HYDROCHLORIDE 300 MG/1
300 CAPSULE ORAL 4 TIMES DAILY
Qty: 28 CAPSULE | Refills: 0 | Status: SHIPPED | OUTPATIENT
Start: 2021-07-02 | End: 2021-07-09

## 2021-07-02 RX ORDER — DEXAMETHASONE 1 MG
TABLET ORAL
Qty: 18 TABLET | Refills: 0 | Status: SHIPPED | OUTPATIENT
Start: 2021-07-02 | End: 2021-07-12

## 2021-07-02 RX ADMIN — DEXAMETHASONE SODIUM PHOSPHATE 4 MG: 4 INJECTION, SOLUTION INTRAMUSCULAR; INTRAVENOUS at 09:06

## 2021-07-02 RX ADMIN — CLINDAMYCIN IN 5 PERCENT DEXTROSE 600 MG: 12 INJECTION, SOLUTION INTRAVENOUS at 06:12

## 2021-07-02 NOTE — DISCHARGE SUMMARY
Discharge Summary - [place] [address] Internal Medicine  Patient: Destini Shaver 42 y o  female   MRN: [id number]  PCP: Teddy Regalado MD  Unit/Bed#: E2 -06 Encounter: [id number]            Discharging Physician / Practitioner: Linette Mcfadden MD  PCP: Teddy Regalado MD  Admission Date:   Admission Orders (From admission, onward)     Ordered        07/01/21 0018  Inpatient Admission  Once                   Discharge Date: 07/02/21      Reason for Admission: Sore throat with swelling       Discharge Diagnoses:     Principal Problem:    Peritonsillar phlegmon    Active Problems:    Thyroid nodule    Leukocytosis (improved)      Consultations During Hospital Stay:  · ENT      Hospital Course:     Destini Shaver is a 42 y o  female patient who originally presented to the hospital on 6/30/2021due to complaints of a sore throat with swelling  Imaging revealed evidence of a peritonsillar phlegmon without underlying abscess  Patient was evaluated by ENT and initiated on IV Clindamycin along with a Decadron course for the edema  Notably, the patient had already booked a flight to leave the country to go to the [facility] and after further discussion on her medical condition, she was urged to postpone the flight least a few days for more hospital care and intravenous treatment  She agreed to stay an extra day and today, she reports near resolution of symptoms without discomfort swallowing, fever/chills, coughing, or other complaints at this time  She will be discharged on oral Clindamycin regimen to complete through entire course, along with a Decadron tapering course for the edema/swelling  She has been strongly urged to follow with a PCP in her native country days upon arrival   Imaging also incidentally revealed a thyroid nodule for which she has been urged to follow-up once she [place]  for a nonurgent ultrasound  She expresses understanding to plan and agrees    Her  was present bedside during my encounter on discharge day today  Condition at Discharge: fair       Discharge Day Visit / Exam:     Vitals: Blood Pressure: 124/76 (07/02/21 0716)  Pulse: 73 (07/02/21 0716)  Temperature: 97 5 °F (36 4 °C) (07/02/21 0716)  Temp Source: Temporal (07/02/21 0716)  Respirations: 18 (07/02/21 0716)  Height: 5' 7" (170 2 cm) (07/01/21 0043)  Weight - Scale: 89 7 kg (197 lb 12 oz) (07/01/21 0043)  SpO2: 100 % (07/02/21 0716)      Physical exam - I had a face-to-face encounter with the patient on day of discharge  Discussion with Patient and/or Family:  The patient has been advised to return to the ER immediately if any symptoms recur or worsen  Discharge instructions/Information to Patient and/or Family:   See after visit summary for information provided to patient and/or family  Provisions for Follow-Up Care:  See after visit summary for information related to follow-up care and any pertinent home health orders  Disposition:   Home      Discharge Medications:  See after visit summary for reconciled discharge medications provided to patient and/or family  Discharge Statement:  I spent 38 minutes discharging the patient  This time was spent on the day of discharge  I had direct contact with the patient on the day of discharge  Greater than 50% of the total time was spent examining patient, answering all patient questions, arranging and discussing plan of care with patient as well as directly providing post-discharge instructions  Additional time then spent on discharge activities  ** Please Note: This note is constructed using a voice recognition dictation system  An occasional wrong word/phrase or sound-a-like substitution may have been picked up by dictation device due to the inherent limitations of voice recognition software  Read the chart carefully and recognize, using reasonable context, where substitutions may have occurred  **

## 2021-07-02 NOTE — PLAN OF CARE
Problem: INFECTION - ADULT  Goal: Absence or prevention of progression during hospitalization  Description: INTERVENTIONS:  - Assess and monitor for signs and symptoms of infection  - Monitor lab/diagnostic results  - Monitor all insertion sites, i e  indwelling lines, tubes, and drains  - Monitor endotracheal if appropriate and nasal secretions for changes in amount and color  - [place] appropriate cooling/warming therapies per order  - Administer medications as ordered  - Instruct and encourage patient and family to use good hand hygiene technique  - Identify and instruct in appropriate isolation precautions for identified infection/condition  Outcome: Adequate for Discharge  Goal: Absence of fever/infection during neutropenic period  Description: INTERVENTIONS:  - Monitor WBC    Outcome: Adequate for Discharge

## 2021-07-02 NOTE — UTILIZATION REVIEW
Inpatient Admission Authorization Request   NOTIFICATION OF INPATIENT ADMISSION/INPATIENT AUTHORIZATION REQUEST   SERVICING FACILITY:   [address] [place] [address], [place] [address] [address] [facility]  Tax ID: [id number]  NPI: [id number]  Place of Service: Inpatient [facility]  [address]  Place of Service Code: 24     ATTENDING PROVIDER:  Attending Name and NPI#: Manuel Mcdonough Md [[id number]]  Address: [address] [place] [address], [place] [address] [address] [facility]  Phone: [phone]     UTILIZATION REVIEW CONTACT:  Kurt Lara Utilization   Network Utilization Review Department  Phone: [phone]  Fax: [phone]  Email: [email or web address]     PHYSICIAN ADVISORY SERVICES:  FOR OESX-SI-NXAO REVIEW - MEDICAL NECESSITY DENIAL  Phone: [phone]  Fax: [phone]  Email: [email or web address]     TYPE OF REQUEST:  Inpatient Status     ADMISSION INFORMATION:  ADMISSION DATE/TIME: 7/1/21 12:18 AM  PATIENT DIAGNOSIS CODE/DESCRIPTION:  Leukocytosis [D72 829]  Sore throat [J02 9]  Throat infection [J02 9]  Tonsillitis [J03 90]  Tonsillar abscess [J36]  DISCHARGE DATE/TIME: No discharge date for patient encounter  DISCHARGE DISPOSITION (IF DISCHARGED): Home/Self Care     IMPORTANT INFORMATION:  Please contact the Eva Chris directly with any questions or concerns regarding this request  Department voicemails are confidential     Send requests for admission clinical reviews, concurrent reviews, approvals, and administrative denials due to lack of clinical to fax [phone]

## 2021-07-06 ENCOUNTER — TRANSITIONAL CARE MANAGEMENT (OUTPATIENT)
Dept: INTERNAL MEDICINE CLINIC | Facility: CLINIC | Age: 46
End: 2021-07-06

## 2021-07-08 NOTE — UTILIZATION REVIEW
Notification of Discharge   This is a Notification of Discharge from our facility [address]  Please be advised that this patient has been discharge from our facility  Below you will find the admission and discharge date and time including the patients disposition  UTILIZATION REVIEW CONTACT:  Ann Vicente  Utilization   Network Utilization Review Department  Phone: [phone] x carefully listen to the prompts  All voicemails are confidential   Email: [email or web address]     PHYSICIAN ADVISORY SERVICES:  FOR DCRK-[id number]-YVGR REVIEW - MEDICAL NECESSITY DENIAL  Phone: [phone]  Fax: [phone]  Email: [email or web address]     PRESENTATION DATE: 6/30/2021  9:10 PM  OBERVATION ADMISSION DATE:   INPATIENT ADMISSION DATE: 7/1/21 12:18 AM   DISCHARGE DATE: 7/2/2021 11:31 AM  DISPOSITION: Home/Self Care Home/Self Care      IMPORTANT INFORMATION:  Send all requests for admission clinical reviews, approved or denied determinations and any other requests to dedicated fax number below belonging to the campus where the patient is receiving treatment   List of dedicated fax numbers:  [address] DENIALS (Administrative/Medical Necessity) [phone]   [address] [place] [address] (Maternity/NICU/Pediatrics) [phone]   Ronnie Mcpherson [phone]   Veda Carvalho [phone]   Paul Perry [phone]   [facility] [address] [place] [address] [phone]   [facility]  [phone]   2204 [facility]  2401 [facility] 1000 [address] [facility] [phone]

## 2021-07-08 NOTE — UTILIZATION REVIEW
Notification of Discharge   This is a Notification of Discharge from our facility [address]  Please be advised that this patient has been discharge from our facility  Below you will find the admission and discharge date and time including the patients disposition  UTILIZATION REVIEW CONTACT:  Jose A Dumas  Utilization   Network Utilization Review Department  Phone: [phone] x carefully listen to the prompts  All voicemails are confidential   Email: [email or web address]     PHYSICIAN ADVISORY SERVICES:  FOR [id number] REVIEW - MEDICAL NECESSITY DENIAL  Phone: [phone]  Fax: [phone]  Email: [email or web address]     PRESENTATION DATE: 6/30/2021  9:10 PM  OBERVATION ADMISSION DATE:  INPATIENT ADMISSION DATE: 7/1/21 12:18 AM   DISCHARGE DATE: 7/2/2021 11:31 AM  DISPOSITION: Home/Self Care Home/Self Care      IMPORTANT INFORMATION:  Send all requests for admission clinical reviews, approved or denied determinations and any other requests to dedicated fax number below belonging to the campus where the patient is receiving treatment   List of dedicated fax numbers:  [address] DENIALS (Administrative/Medical Necessity) [phone]   [address] (Maternity/NICU/Pediatrics) [phone]   Jesus Orn [phone]   Misty Shannon [phone]   [place] [phone]   Marielena Ordaz [facility] [address] [place] [address] [phone]   [facility]  [phone]   2205 [facility]  2401 [facility] 1000 [address] [facility] [phone]

## 2021-10-18 ENCOUNTER — TELEPHONE (OUTPATIENT)
Dept: SURGICAL ONCOLOGY | Facility: CLINIC | Age: 46
End: 2021-10-18

## 2021-11-01 ENCOUNTER — TELEPHONE (OUTPATIENT)
Dept: ADMINISTRATIVE | Facility: OTHER | Age: 46
End: 2021-11-01

## 2021-11-01 ENCOUNTER — OFFICE VISIT (OUTPATIENT)
Dept: FAMILY MEDICINE CLINIC | Facility: CLINIC | Age: 46
End: 2021-11-01
Payer: COMMERCIAL

## 2021-11-01 VITALS
DIASTOLIC BLOOD PRESSURE: 82 MMHG | BODY MASS INDEX: 30.22 KG/M2 | RESPIRATION RATE: 20 BRPM | WEIGHT: 199.4 LBS | HEART RATE: 86 BPM | SYSTOLIC BLOOD PRESSURE: 122 MMHG | HEIGHT: 68 IN | TEMPERATURE: 98.2 F | OXYGEN SATURATION: 98 %

## 2021-11-01 DIAGNOSIS — Z00.00 ANNUAL PHYSICAL EXAM: Primary | ICD-10-CM

## 2021-11-01 DIAGNOSIS — E78.2 MIXED HYPERLIPIDEMIA: ICD-10-CM

## 2021-11-01 DIAGNOSIS — E04.1 THYROID NODULE: ICD-10-CM

## 2021-11-01 DIAGNOSIS — R53.83 OTHER FATIGUE: ICD-10-CM

## 2021-11-01 DIAGNOSIS — L25.9 CONTACT DERMATITIS, UNSPECIFIED CONTACT DERMATITIS TYPE, UNSPECIFIED TRIGGER: ICD-10-CM

## 2021-11-01 DIAGNOSIS — Z11.59 ENCOUNTER FOR HEPATITIS C SCREENING TEST FOR LOW RISK PATIENT: ICD-10-CM

## 2021-11-01 PROCEDURE — 99386 PREV VISIT NEW AGE 40-64: CPT | Performed by: NURSE PRACTITIONER

## 2021-11-01 PROCEDURE — 1036F TOBACCO NON-USER: CPT | Performed by: NURSE PRACTITIONER

## 2021-11-01 PROCEDURE — 3725F SCREEN DEPRESSION PERFORMED: CPT | Performed by: NURSE PRACTITIONER

## 2021-11-01 PROCEDURE — 99214 OFFICE O/P EST MOD 30 MIN: CPT | Performed by: NURSE PRACTITIONER

## 2021-11-01 PROCEDURE — 3008F BODY MASS INDEX DOCD: CPT | Performed by: NURSE PRACTITIONER

## 2021-11-03 ENCOUNTER — HOSPITAL ENCOUNTER (OUTPATIENT)
Dept: ULTRASOUND IMAGING | Facility: HOSPITAL | Age: 46
Discharge: HOME | End: 2021-11-03
Payer: COMMERCIAL

## 2021-11-03 ENCOUNTER — APPOINTMENT (OUTPATIENT)
Dept: LAB | Facility: HOSPITAL | Age: 46
End: 2021-11-03
Payer: COMMERCIAL

## 2021-11-03 DIAGNOSIS — Z11.59 ENCOUNTER FOR HEPATITIS C SCREENING TEST FOR LOW RISK PATIENT: ICD-10-CM

## 2021-11-03 DIAGNOSIS — E78.2 MIXED HYPERLIPIDEMIA: ICD-10-CM

## 2021-11-03 DIAGNOSIS — E04.1 THYROID NODULE: ICD-10-CM

## 2021-11-03 DIAGNOSIS — R53.83 OTHER FATIGUE: ICD-10-CM

## 2021-11-03 LAB
BASOPHILS # BLD AUTO: 0.03 THOUSANDS/ΜL (ref 0–0.1)
BASOPHILS NFR BLD AUTO: 0 % (ref 0–1)
CHOLEST SERPL-MCNC: 187 MG/DL (ref 50–200)
EOSINOPHIL # BLD AUTO: 0.33 THOUSAND/ΜL (ref 0–0.61)
EOSINOPHIL NFR BLD AUTO: 5 % (ref 0–6)
ERYTHROCYTE [DISTWIDTH] IN BLOOD BY AUTOMATED COUNT: 12.9 % (ref 11.6–15.1)
HCT VFR BLD AUTO: 43.3 % (ref 34.8–46.1)
HCV AB SER QL: NORMAL
HDLC SERPL-MCNC: 53 MG/DL
HGB BLD-MCNC: 14 G/DL (ref 11.5–15.4)
IMM GRANULOCYTES # BLD AUTO: 0.03 THOUSAND/UL (ref 0–0.2)
IMM GRANULOCYTES NFR BLD AUTO: 0 % (ref 0–2)
LDLC SERPL CALC-MCNC: 111 MG/DL (ref 0–100)
LYMPHOCYTES # BLD AUTO: 2.49 THOUSANDS/ΜL (ref 0.6–4.47)
LYMPHOCYTES NFR BLD AUTO: 37 % (ref 14–44)
MCH RBC QN AUTO: 27.5 PG (ref 26.8–34.3)
MCHC RBC AUTO-ENTMCNC: 32.3 G/DL (ref 31.4–37.4)
MCV RBC AUTO: 85 FL (ref 82–98)
MONOCYTES # BLD AUTO: 0.42 THOUSAND/ΜL (ref 0.17–1.22)
MONOCYTES NFR BLD AUTO: 6 % (ref 4–12)
NEUTROPHILS # BLD AUTO: 3.47 THOUSANDS/ΜL (ref 1.85–7.62)
NEUTS SEG NFR BLD AUTO: 52 % (ref 43–75)
NONHDLC SERPL-MCNC: 134 MG/DL
NRBC BLD AUTO-RTO: 0 /100 WBCS
PLATELET # BLD AUTO: 264 THOUSANDS/UL (ref 149–390)
PMV BLD AUTO: 10.1 FL (ref 8.9–12.7)
RBC # BLD AUTO: 5.09 MILLION/UL (ref 3.81–5.12)
TRIGL SERPL-MCNC: 117 MG/DL
TSH SERPL DL<=0.05 MIU/L-ACNC: 2.09 UIU/ML (ref 0.36–3.74)
WBC # BLD AUTO: 6.77 THOUSAND/UL (ref 4.31–10.16)

## 2021-11-03 PROCEDURE — 86803 HEPATITIS C AB TEST: CPT

## 2021-11-03 PROCEDURE — 85025 COMPLETE CBC W/AUTO DIFF WBC: CPT

## 2021-11-03 PROCEDURE — 80061 LIPID PANEL: CPT

## 2021-11-03 PROCEDURE — 84443 ASSAY THYROID STIM HORMONE: CPT

## 2021-11-03 PROCEDURE — 76536 US EXAM OF HEAD AND NECK: CPT

## 2021-11-03 PROCEDURE — 36415 COLL VENOUS BLD VENIPUNCTURE: CPT

## 2021-11-09 ENCOUNTER — TELEPHONE (OUTPATIENT)
Dept: FAMILY MEDICINE CLINIC | Facility: CLINIC | Age: 46
End: 2021-11-09

## 2021-11-30 ENCOUNTER — OFFICE VISIT (OUTPATIENT)
Dept: URGENT CARE | Age: 46
End: 2021-11-30
Payer: COMMERCIAL

## 2021-11-30 VITALS
BODY MASS INDEX: 30.16 KG/M2 | RESPIRATION RATE: 20 BRPM | HEIGHT: 68 IN | OXYGEN SATURATION: 100 % | WEIGHT: 199 LBS | HEART RATE: 70 BPM | TEMPERATURE: 98.6 F

## 2021-11-30 DIAGNOSIS — B34.9 ACUTE VIRAL SYNDROME: Primary | ICD-10-CM

## 2021-11-30 LAB — S PYO AG THROAT QL: NEGATIVE

## 2021-11-30 PROCEDURE — G0382 LEV 3 HOSP TYPE B ED VISIT: HCPCS | Performed by: PHYSICIAN ASSISTANT

## 2021-11-30 PROCEDURE — 87070 CULTURE OTHR SPECIMN AEROBIC: CPT | Performed by: PHYSICIAN ASSISTANT

## 2021-11-30 PROCEDURE — 0241U HB NFCT DS VIR RESP RNA 4 TRGT: CPT | Performed by: PHYSICIAN ASSISTANT

## 2021-11-30 PROCEDURE — 87147 CULTURE TYPE IMMUNOLOGIC: CPT | Performed by: PHYSICIAN ASSISTANT

## 2021-12-01 LAB
FLUAV RNA RESP QL NAA+PROBE: NEGATIVE
FLUBV RNA RESP QL NAA+PROBE: NEGATIVE
RSV RNA RESP QL NAA+PROBE: NEGATIVE
SARS-COV-2 RNA RESP QL NAA+PROBE: POSITIVE

## 2021-12-02 ENCOUNTER — TELEMEDICINE (OUTPATIENT)
Dept: FAMILY MEDICINE CLINIC | Facility: CLINIC | Age: 46
End: 2021-12-02
Payer: COMMERCIAL

## 2021-12-02 VITALS — BODY MASS INDEX: 30.26 KG/M2 | WEIGHT: 199 LBS

## 2021-12-02 DIAGNOSIS — U07.1 COVID-19 VIRUS INFECTION: Primary | ICD-10-CM

## 2021-12-02 PROCEDURE — 99213 OFFICE O/P EST LOW 20 MIN: CPT | Performed by: NURSE PRACTITIONER

## 2021-12-03 ENCOUNTER — TELEPHONE (OUTPATIENT)
Dept: URGENT CARE | Age: 46
End: 2021-12-03

## 2021-12-03 ENCOUNTER — TELEMEDICINE (OUTPATIENT)
Dept: FAMILY MEDICINE CLINIC | Facility: CLINIC | Age: 46
End: 2021-12-03
Payer: COMMERCIAL

## 2021-12-03 DIAGNOSIS — J02.0 STREP PHARYNGITIS: Primary | ICD-10-CM

## 2021-12-03 DIAGNOSIS — U07.1 COVID-19 VIRUS INFECTION: Primary | ICD-10-CM

## 2021-12-03 LAB — BACTERIA THROAT CULT: ABNORMAL

## 2021-12-03 PROCEDURE — 99213 OFFICE O/P EST LOW 20 MIN: CPT | Performed by: NURSE PRACTITIONER

## 2021-12-03 RX ORDER — AZITHROMYCIN 250 MG/1
TABLET, FILM COATED ORAL
Qty: 6 TABLET | Refills: 0 | Status: SHIPPED | OUTPATIENT
Start: 2021-12-03 | End: 2021-12-07

## 2021-12-06 ENCOUNTER — TELEMEDICINE (OUTPATIENT)
Dept: FAMILY MEDICINE CLINIC | Facility: CLINIC | Age: 46
End: 2021-12-06
Payer: COMMERCIAL

## 2021-12-06 VITALS — BODY MASS INDEX: 30.26 KG/M2 | WEIGHT: 199 LBS

## 2021-12-06 DIAGNOSIS — U07.1 COVID-19 VIRUS INFECTION: Primary | ICD-10-CM

## 2021-12-06 PROCEDURE — 99213 OFFICE O/P EST LOW 20 MIN: CPT | Performed by: NURSE PRACTITIONER

## 2021-12-09 ENCOUNTER — OFFICE VISIT (OUTPATIENT)
Dept: FAMILY MEDICINE CLINIC | Facility: CLINIC | Age: 46
End: 2021-12-09
Payer: COMMERCIAL

## 2021-12-09 VITALS
BODY MASS INDEX: 30.16 KG/M2 | TEMPERATURE: 97.7 F | HEIGHT: 68 IN | SYSTOLIC BLOOD PRESSURE: 130 MMHG | OXYGEN SATURATION: 99 % | WEIGHT: 199 LBS | DIASTOLIC BLOOD PRESSURE: 86 MMHG | HEART RATE: 88 BPM | RESPIRATION RATE: 18 BRPM

## 2021-12-09 DIAGNOSIS — U07.1 COVID-19 VIRUS INFECTION: Primary | ICD-10-CM

## 2021-12-09 PROCEDURE — 99214 OFFICE O/P EST MOD 30 MIN: CPT | Performed by: NURSE PRACTITIONER

## 2021-12-09 PROCEDURE — 3008F BODY MASS INDEX DOCD: CPT | Performed by: NURSE PRACTITIONER

## 2021-12-09 PROCEDURE — 1036F TOBACCO NON-USER: CPT | Performed by: NURSE PRACTITIONER

## 2021-12-09 PROCEDURE — 3725F SCREEN DEPRESSION PERFORMED: CPT | Performed by: NURSE PRACTITIONER

## 2022-01-18 ENCOUNTER — TELEPHONE (OUTPATIENT)
Dept: SURGICAL ONCOLOGY | Facility: CLINIC | Age: 47
End: 2022-01-18

## 2022-01-18 NOTE — TELEPHONE ENCOUNTER
 Hello, can I please speak to (patient name) this is (enter your name here) calling from [facility]) to remind you of your appointment on (date and time) at (location)  I am calling to review our no-show/cancelation policy and complete your COVID screening questions  Do you have a few minutes? We ask that you come at least 15 minutes early for your appointment to complete all paperwork, if you are 20 minutes late for your appointment, we may need to reschedule you  We require at least 24-hour notice for cancelations and if you miss your appointment 3 times, we may unfortunately not be able to reschedule your future visit  Considering the current events related to the COVID-19 virus and in being proactive and making sure we are keeping our patients, their families and staff safe, we are screening prior to all patient appointments      1  Are you currently experiencing any symptoms of fever, cough, shortness of breath, chills, repeated shaking with chills, muscle pain, headache, sore throat, or new loss of taste/smell?   ? No - continue to the next question     2  Have you been tested for COVID-19 within the past 5 days? ?  No - continue with visit

## 2022-01-21 ENCOUNTER — CONSULT (OUTPATIENT)
Dept: SURGICAL ONCOLOGY | Facility: CLINIC | Age: 47
End: 2022-01-21
Payer: COMMERCIAL

## 2022-01-21 VITALS
SYSTOLIC BLOOD PRESSURE: 134 MMHG | HEIGHT: 68 IN | BODY MASS INDEX: 30.77 KG/M2 | OXYGEN SATURATION: 98 % | DIASTOLIC BLOOD PRESSURE: 80 MMHG | WEIGHT: 203 LBS | TEMPERATURE: 97.3 F | HEART RATE: 76 BPM | RESPIRATION RATE: 16 BRPM

## 2022-01-21 DIAGNOSIS — Z91.89 AT HIGH RISK FOR BREAST CANCER: ICD-10-CM

## 2022-01-21 DIAGNOSIS — Z12.39 ENCOUNTER FOR BREAST CANCER SCREENING OTHER THAN MAMMOGRAM: Primary | ICD-10-CM

## 2022-01-21 DIAGNOSIS — Z12.31 VISIT FOR SCREENING MAMMOGRAM: ICD-10-CM

## 2022-01-21 DIAGNOSIS — R92.2 DENSE BREAST TISSUE ON MAMMOGRAM: ICD-10-CM

## 2022-01-21 PROBLEM — Z80.3 FAMILY HISTORY OF BREAST CANCER: Status: ACTIVE | Noted: 2022-01-21

## 2022-01-21 PROBLEM — R92.30 DENSE BREASTS: Status: ACTIVE | Noted: 2022-01-21

## 2022-01-21 PROCEDURE — 99204 OFFICE O/P NEW MOD 45 MIN: CPT | Performed by: NURSE PRACTITIONER

## 2022-01-21 PROCEDURE — 1036F TOBACCO NON-USER: CPT | Performed by: NURSE PRACTITIONER

## 2022-01-21 PROCEDURE — 3008F BODY MASS INDEX DOCD: CPT | Performed by: NURSE PRACTITIONER

## 2022-01-21 NOTE — PROGRESS NOTES
Surgical Oncology Follow Up       [facility] [facility] [address]    Greta [birth year]  1975  [id number]  [facility] SURGICAL ONCOLOGY [place]  [place]ina [place] [address]    Chief Complaint   Patient presents with    Consult       Assessment/Plan:  1  Encounter for breast cancer screening other than mammogram      2  Dense breast tissue on mammogram  - Ambulatory Referral to Oncology Genetics; Future  - MRI breast bilateral w and wo contrast w cad; Future    3  At high risk for breast cancer  - Ambulatory Referral to Oncology Genetics; Future  - MRI breast bilateral w and wo contrast w cad; Future  - BUN; Future  - Creatinine, serum; Future    4  Visit for screening mammogram  - Mammo screening bilateral w 3d & cad; Future        Discussion/Summary:  Patient is a 44-year-old female that presents today for consultation regarding an increased risk of breast cancer, family history of breast cancer and dense breasts  She has a significant family history for breast cancer  I recommended genetic testing and patient is agreeable  I will refer her to oncology Genetics for testing  I have calculated her lifetime TC risk to be 55%  We discussed her overall risk and I have reviewed risk reducing measures with her  I recommended annual 3D mammography staggered with annual breast MRI and clinical breast exams every 6 months  Patient is not interested in chemoprevention at this time  She notices no changes on her self breast exam and there are no concerns on my exam today  I will make arrangements for her imaging and plan to see her back in 1 year  She will receive a clinical breast exam with her gynecologist in approximately 6 months  I encouraged her to be self-breast aware and contact me with any changes on self-breast exam   She is in agreement with these recommendations    All of her questions were answered today  History of Present Illness:        -Interval History:  Patient is a 30-year-old female who presents today for consultation regarding an increased risk of breast cancer, family history of breast cancer and dense breasts  Her mother, maternal grandmother and 2 maternal aunts were diagnosed with breast cancer as well as 1 paternal aunt  Her mother was diagnosed at age 67  Patient reports her knees underwent genetic testing which was reported as negative but no one else in the family has undergone testing  She is not of Ashkenazi [place] descent  She notices no changes on her self breast exam and has never had a breast biopsy  She had a bilateral mammogram  in June of 2021 which was BI-RADS 1, category 4 density  Menarche age 17, 2 pregnancies, 2 live births  She was 31 at the time her 1st child was born  She has used birth control pills for 10 years but has never used hormone replacement therapy  Both ovaries remain intact  She is a nonsmoker and nondrinker  Referring: John Sabillon DO      Review of Systems:  Review of Systems   Constitutional: Negative for chills, fatigue and fever  Respiratory: Negative for cough and shortness of breath  Cardiovascular: Negative for chest pain  Hematological: Negative for adenopathy  Psychiatric/Behavioral: Negative for confusion         Patient Active Problem List   Diagnosis    Lumbar radiculopathy    Polyarthralgia    Vitamin D deficiency    Prediabetes    Pharyngitis    Peritonsillar abscess    Sepsis ([address] )    Thyroid nodule    Annual physical exam    Contact dermatitis    Other fatigue    Mixed hyperlipidemia    Encounter for hepatitis C screening test for low risk patient    COVID-19 virus infection    Increased risk of breast cancer    Family history of breast cancer    Dense breasts     Past Medical History:   Diagnosis Date    Thyroid nodule      Past Surgical History:   Procedure Laterality Date  TUBAL LIGATION      WISDOM TOOTH EXTRACTION       Family History   Problem Relation Age of Onset    Diabetes Mother     Breast cancer Mother 68    No Known Problems Sister     Breast cancer Maternal Aunt 71    Breast cancer Maternal Aunt 72    Breast cancer Maternal Aunt 58    Breast cancer Maternal Aunt 54    No Known Problems Maternal Aunt     No Known Problems Maternal Aunt     No Known Problems Sister     No Known Problems Sister     Breast cancer Paternal Aunt         unknown age   Earna Annmarie No Known Problems Paternal Aunt     No Known Problems Paternal Aunt     No Known Problems Paternal Aunt     No Known Problems Paternal Aunt     No Known Problems Father     No Known Problems Daughter     No Known Problems Maternal Grandmother     No Known Problems Paternal Grandmother     No Known Problems Paternal Grandfather     No Known Problems Paternal Aunt     No Known Problems Paternal Aunt      Social History     Socioeconomic History    Marital status: Registered Domestic Partner     Spouse name: Not on file    Number of children: Not on file    Years of education: Not on file    Highest education level: Not on file   Occupational History    Not on file   Tobacco Use    Smoking status: Never Smoker    Smokeless tobacco: Never Used   Vaping Use    Vaping Use: Never used   Substance and Sexual Activity    Alcohol use: No    Drug use: No    Sexual activity: Yes     Partners: Male     Birth control/protection: None   Other Topics Concern    Not on file   Social History Narrative    Single    2 children    Always uses seat belt    Caffeine use     Social Determinants of Health     Financial Resource Strain: Not on file   Food Insecurity: Not on file   Transportation Needs: Not on file   Physical Activity: Not on file   Stress: Not on file   Social Connections: Not on file   Intimate Partner Violence: Not on file   Housing Stability: Not on file       Current Outpatient Medications:    cholecalciferol 2000 units TABS, Take 1 tablet (2,000 Units total) by mouth daily, Disp: 90 tablet, Rfl: 3    acetaminophen (TYLENOL) 650 mg CR tablet, Take 1 tablet (650 mg total) by mouth every 8 (eight) hours as needed for mild pain (Patient not taking: Reported on 11/1/2021), Disp: 30 tablet, Rfl: 0    clotrimazole-betamethasone (LOTRISONE) 1-0 05 % cream, Apply topically 2 (two) times a day (Patient not taking: Reported on 11/1/2021), Disp: 30 g, Rfl: 2    naproxen (NAPROSYN) 500 mg tablet, Take 1 tablet (500 mg total) by mouth 2 (two) times a day as needed for mild pain or moderate pain (Patient not taking: Reported on 11/1/2021), Disp: 30 tablet, Rfl: 0    triamcinolone (KENALOG) 0 1 % ointment, Apply topically 2 (two) times a day (Patient not taking: Reported on 11/30/2021 ), Disp: 30 g, Rfl: 0  Allergies   Allergen Reactions    Penicillins Swelling     Vitals:    01/21/22 1028   BP: 134/80   Pulse: 76   Resp: 16   Temp: (!) 97 3 °F (36 3 °C)   SpO2: 98%       Physical Exam  Vitals reviewed  Constitutional:       Appearance: Normal appearance  HENT:      Head: Normocephalic and atraumatic  Pulmonary:      Effort: Pulmonary effort is normal    Chest:   Breasts:      Right: No swelling, bleeding, inverted nipple, mass, nipple discharge, skin change, tenderness, axillary adenopathy or supraclavicular adenopathy  Left: No swelling, bleeding, inverted nipple, mass, nipple discharge, skin change, tenderness, axillary adenopathy or supraclavicular adenopathy  Lymphadenopathy:      Upper Body:      Right upper body: No supraclavicular or axillary adenopathy  Left upper body: No supraclavicular or axillary adenopathy  Neurological:      General: No focal deficit present  Mental Status: She is alert and oriented to person, place, and time     Psychiatric:         Mood and Affect: Mood normal            Results:        Imaging  6/5/21-bilateral 3D screening mammogram, BI-RADS 1, category 4 density  Advance Care Planning/Advance Directives:  Discussed disease status, treatment plans, treatment goals with the patient

## 2022-01-21 NOTE — PATIENT INSTRUCTIONS
Breast Self Exam for Women   AMBULATORY CARE:   A breast self-exam (BSE)  is a way to check your breasts for lumps and other changes  Regular BSEs can help you know how your breasts normally look and feel  Most breast lumps or changes are not cancer, but you should always have them checked by a healthcare provider  Why you should do a BSE:  Breast cancer is the most common type of cancer in women  Even if you have mammograms, you may still want to do a BSE regularly  If you know how your breasts normally feel and look, it may help you know when to contact your healthcare provider  Mammograms can miss some cancers  You may find a lump during a BSE that did not show up on a mammogram   When you should do a BSE:  If you have periods, you may want to do your BSE 1 week after your period ends  This is the time when your breasts may be the least swollen, lumpy, or tender  You can do regular BSEs even if you are breastfeeding or have breast implants  Call your doctor if:   · You find any lumps or changes in your breasts  · You have breast pain or fluid coming from your nipples  · You have questions or concerns about your condition or care  How to do a BSE:       · Look at your breasts in a mirror  Look at the size and shape of each breast and nipple  Check for swelling, lumps, dimpling, scaly skin, or other skin changes  Look for nipple changes, such as a nipple that is painful or beginning to pull inward  Gently squeeze both nipples and check to see if fluid (that is not breast milk) comes out of them  If you find any of these or other breast changes, contact your healthcare provider  Check your breasts while you sit or  the following 3 positions:    ? Hang your arms down at your sides  ? Raise your hands and join them behind your head  ? Put firm pressure with your hands on your hips  Bend slightly forward while you look at your breasts in the mirror  · Lie down and feel your breasts    When you lie down, your breast tissue spreads out evenly over your chest  This makes it easier for you to feel for lumps and anything that may not be normal for your breasts  Do a BSE on one breast at a time  ? Place a small pillow or towel under your left shoulder  Put your left arm behind your head  ? Use the 3 middle fingers of your right hand  Use your fingertip pads, on the top of your fingers  Your fingertip pad is the most sensitive part of your finger  ? Use small circles to feel your breast tissue  Use your fingertip pads to make dime-sized, overlapping circles on your breast and armpits  Use light, medium, and firm pressure  First, press lightly  Second, press with medium pressure to feel a little deeper into the breast  Last, use firm pressure to feel deep within your breast     ? Examine your entire breast area  Examine the breast area from above the breast to below the breast where you feel only ribs  Make small circles with your fingertips, starting in the middle of your armpit  Make circles going up and down the breast area  Continue toward your breast and all the way across it  Examine the area from your armpit all the way over to the middle of your chest (breastbone)  Stop at the middle of your chest     ? Move the pillow or towel to your right shoulder, and put your right arm behind your head  Use the 3 fingertip pads of your left hand, and repeat the above steps to do a BSE on your right breast     What else you can do to check for breast problems or cancer:  Talk to your healthcare provider about mammograms  A mammogram is an x-ray of your breasts to screen for breast cancer or other problems  Your provider can tell you the benefits and risks of mammograms  The first mammogram is usually at age 39 or 48  Your provider may recommend you start at 36 or younger if your risk for breast cancer is high  Mammograms usually continue every 1 to 2 years until age 76         Follow up with your doctor as directed:  Write down your questions so you remember to ask them during your visits  © Copyright [employer] 2021 Information is for End User's use only and may not be sold, redistributed or otherwise used for commercial purposes  All illustrations and images included in CareNotes® are the copyrighted property of [employer]  or Kalen Vásquez  The above information is an  only  It is not intended as medical advice for individual conditions or treatments  Talk to your doctor, nurse or pharmacist before following any medical regimen to see if it is safe and effective for you

## 2022-01-25 ENCOUNTER — TELEPHONE (OUTPATIENT)
Dept: GENETICS | Facility: CLINIC | Age: 47
End: 2022-01-25

## 2022-01-25 NOTE — TELEPHONE ENCOUNTER
I called Jose Rahman to schedule a new patient appointment with the Cancer [facility] and [facility]etics Program       Outcome:   I left a voice message encouraging the patient to call the genetics team at [phone] to schedule this appointment  Follow-up:   At this time the referral will be closed and we will wait to hear back from the patient regarding scheduling this appointment

## 2022-04-05 ENCOUNTER — TELEPHONE (OUTPATIENT)
Dept: HEMATOLOGY ONCOLOGY | Facility: CLINIC | Age: 47
End: 2022-04-05

## 2022-04-05 NOTE — TELEPHONE ENCOUNTER
Maikel Maldonado from [facility] called in to see if an authorization has been started for patients mammogram on 6/7/22  Maikel Maldonado states that the tech is leaving the 6/8/22 and needs to be seen on 6/7/22  Maikel Maldonado provided the Tax ID number, MPI number, and Procedure number  Tax ID number [id number]    MPI [id number]    Procedure number  [id number]    Maikel Maldonado is requesting a call back when authorization is completed or if there is any problems  Maikel Maldonado can be reached back at [phone]

## 2022-06-07 ENCOUNTER — HOSPITAL ENCOUNTER (OUTPATIENT)
Dept: RADIOLOGY | Age: 47
Discharge: HOME | End: 2022-06-07
Payer: COMMERCIAL

## 2022-06-07 VITALS — WEIGHT: 198 LBS | HEIGHT: 68 IN | BODY MASS INDEX: 30.01 KG/M2

## 2022-06-07 DIAGNOSIS — Z12.31 VISIT FOR SCREENING MAMMOGRAM: ICD-10-CM

## 2022-06-07 PROCEDURE — 77063 BREAST TOMOSYNTHESIS BI: CPT

## 2022-06-07 PROCEDURE — 77067 SCR MAMMO BI INCL CAD: CPT

## 2022-06-24 ENCOUNTER — ANNUAL EXAM (OUTPATIENT)
Dept: OBGYN CLINIC | Facility: CLINIC | Age: 47
End: 2022-06-24
Payer: COMMERCIAL

## 2022-06-24 VITALS
WEIGHT: 196.4 LBS | SYSTOLIC BLOOD PRESSURE: 116 MMHG | BODY MASS INDEX: 30.83 KG/M2 | HEIGHT: 67 IN | DIASTOLIC BLOOD PRESSURE: 74 MMHG

## 2022-06-24 DIAGNOSIS — E55.9 VITAMIN D DEFICIENCY: ICD-10-CM

## 2022-06-24 DIAGNOSIS — B35.4 TINEA CORPORIS: ICD-10-CM

## 2022-06-24 DIAGNOSIS — Z12.31 ENCOUNTER FOR SCREENING MAMMOGRAM FOR BREAST CANCER: ICD-10-CM

## 2022-06-24 DIAGNOSIS — R92.2 DENSE BREAST TISSUE: ICD-10-CM

## 2022-06-24 DIAGNOSIS — Z01.419 ENCOUNTER FOR ANNUAL ROUTINE GYNECOLOGICAL EXAMINATION: ICD-10-CM

## 2022-06-24 DIAGNOSIS — Z80.3 FAMILY HISTORY OF BREAST CANCER: ICD-10-CM

## 2022-06-24 PROCEDURE — S0612 ANNUAL GYNECOLOGICAL EXAMINA: HCPCS | Performed by: OBSTETRICS & GYNECOLOGY

## 2022-06-24 RX ORDER — CLOTRIMAZOLE AND BETAMETHASONE DIPROPIONATE 10; .64 MG/G; MG/G
CREAM TOPICAL 2 TIMES DAILY
Qty: 30 G | Refills: 2 | Status: SHIPPED | OUTPATIENT
Start: 2022-06-24

## 2022-06-24 NOTE — PROGRESS NOTES
Assessment     Annual well-woman exam    Dense breast tissue on mammogram    Increased lifetime risk of breast cancer    Normal ABUS    Continue monitoring and surveillance with Dr Margot Barth     Family history of breast cancer    History of tinea corporis    History of vitamin-D deficiency    Colon cancer screening, patient declined Cologuard         Plan      Recent mammography within normal limits      Pap smear deferred, next Pap due in     All questions answered  Subjective  Here for annual exam     Bee Espinoza is a 41 y o  female who presents for annual exam  Periods are occasionally irregular, lasting 3 days  Dysmenorrhea:mild, occurring throughout menses  Cyclic symptoms include none  No intermenstrual bleeding, spotting, or discharge  The patient reports that there is not domestic violence in her life  Current contraception: none  History of abnormal Pap smear: no  Family history of uterine or ovarian cancer: no  Regular self breast exam: yes  History of abnormal mammogram: yes -  Dense breast tissue on mammogram  Family history of breast cancer: yes -  Her mother, maternal grandmother and 2 maternal aunts diagnosed with breast cancer as well as 1 paternal aunt  Genetic testing was recommended  History of abnormal lipids: yes -  Elevated LDL  Menstrual History:  OB History        2    Para   2    Term   2            AB        Living           SAB        IAB        Ectopic        Multiple        Live Births                    Menarche age: 17  Patient's last menstrual period was 2022  Review of Systems  Pertinent items are noted in HPI        Objective  No acute distress     /74 (BP Location: Left arm, Patient Position: Sitting)   Ht 5' 7" (1 702 m)   Wt 89 1 kg (196 lb 6 4 oz)   LMP 2022   BMI 30 76 kg/m²     General:   alert and oriented, in no acute distress, alert, appears stated age and cooperative   Heart: regular rate and rhythm, S1, S2 normal, no murmur, click, rub or gallop   Lungs: clear to auscultation bilaterally   Abdomen: soft, non-tender, without masses or organomegaly   Vulva: normal, Bartholin's, Urethra, [place]'s normal, female escutcheon   Vagina: normal mucosa normal discharge   Cervix: multiparous appearance, no cervical motion tenderness, no lesions and  Pap smear deferred until 2023   Uterus: normal size, anteverted, mobile, non-tender, normal shape and consistency   Adnexa: normal adnexa and no mass, fullness, tenderness   Bilateral breast exam in the sitting and supine position with chaperone present, no visible or palpable breast lesions identified  No breast masses noted  No supraclavicular or axillary lymphadenopathy noted  No nipple discharge  Reviewed self-breast exam techniques     Rectal exam,  deferred

## 2022-10-12 PROBLEM — A41.9 SEPSIS (HCC): Status: RESOLVED | Noted: 2021-07-01 | Resolved: 2022-10-12

## 2022-11-02 ENCOUNTER — OFFICE VISIT (OUTPATIENT)
Dept: FAMILY MEDICINE CLINIC | Facility: CLINIC | Age: 47
End: 2022-11-02

## 2022-11-02 VITALS
TEMPERATURE: 99.9 F | DIASTOLIC BLOOD PRESSURE: 78 MMHG | SYSTOLIC BLOOD PRESSURE: 116 MMHG | RESPIRATION RATE: 20 BRPM | OXYGEN SATURATION: 98 % | HEART RATE: 84 BPM | HEIGHT: 67 IN | WEIGHT: 199.2 LBS | BODY MASS INDEX: 31.27 KG/M2

## 2022-11-02 DIAGNOSIS — E04.1 THYROID NODULE: ICD-10-CM

## 2022-11-02 DIAGNOSIS — Z12.11 SCREENING FOR COLON CANCER: ICD-10-CM

## 2022-11-02 DIAGNOSIS — M79.671 HEEL PAIN, BILATERAL: ICD-10-CM

## 2022-11-02 DIAGNOSIS — R73.03 PRE-DIABETES: ICD-10-CM

## 2022-11-02 DIAGNOSIS — E66.9 OBESITY (BMI 30-39.9): ICD-10-CM

## 2022-11-02 DIAGNOSIS — Z00.00 ENCOUNTER FOR ROUTINE ADULT HEALTH EXAMINATION WITHOUT ABNORMAL FINDINGS: Primary | ICD-10-CM

## 2022-11-02 DIAGNOSIS — E55.9 VITAMIN D DEFICIENCY: ICD-10-CM

## 2022-11-02 DIAGNOSIS — E78.2 MIXED HYPERLIPIDEMIA: ICD-10-CM

## 2022-11-02 DIAGNOSIS — M79.672 HEEL PAIN, BILATERAL: ICD-10-CM

## 2022-11-02 PROBLEM — M51.26 HERNIATED NUCLEUS PULPOSUS, L2-3 LEFT: Status: ACTIVE | Noted: 2017-11-01

## 2022-11-02 RX ORDER — MELOXICAM 15 MG/1
15 TABLET ORAL DAILY
Qty: 14 TABLET | Refills: 0 | Status: SHIPPED | OUTPATIENT
Start: 2022-11-02

## 2022-11-02 NOTE — PROGRESS NOTES
ADULT ANNUAL PHYSICAL   [facility] PRIMARY CARE [facility]    NAME: Natanael Skelton  AGE: 41 y o  SEX: female  : 1975     DATE: 2022     Assessment and Plan:     Problem List Items Addressed This Visit        Endocrine    Thyroid nodule     2 4 cm left mid gland nodule per ultrasound 2021  Repeat ultrasound  Lab Results   Component Value Date    [id number] 2 094 2021              Relevant Orders    CBC and differential    US thyroid       Other    Vitamin D deficiency     Last vitamin-D 22 1 in 2020  Continue vitamin-D supplement  Repeat lab  Relevant Orders    Vitamin D 25 hydroxy    Mixed hyperlipidemia     Lab Results   Component Value Date    LDLCALC 111 (H) 2021     Low ASCVD risk score  Continue diet and exercise  Repeat lab         Relevant Orders    Lipid panel    Obesity (BMI 30-39  9)     BMI Counseling: Body mass index is 31 2 kg/m²  The BMI is above normal  Nutrition recommendations include reducing portion sizes and 3-5 servings of fruits/vegetables daily  Exercise recommendations include exercising 3-5 times per week  Other Visit Diagnoses     Encounter for routine adult health examination without abnormal findings    -  Primary    Relevant Orders    CBC and differential    Pre-diabetes        Relevant Orders    Comprehensive metabolic panel    HEMOGLOBIN A1C W/ EAG ESTIMATION    Heel pain, bilateral        Left greater than right heel pain  Suspect plantar fasciitis  Recommend frozen water bottle exercise  Start meloxicam   Consider imaging and podiatry referral    Relevant Medications    meloxicam (Mobic) 15 mg tablet    Screening for colon cancer        Relevant Orders    Ambulatory referral for colonoscopy          Immunizations and preventive care screenings were discussed with patient today   Appropriate education was printed on patient's after visit summary  Counseling:  Alcohol/drug use: discussed moderation in alcohol intake, the recommendations for healthy alcohol use, and avoidance of illicit drug use  Dental Health: discussed importance of regular tooth brushing, flossing, and dental visits  Injury prevention: discussed safety/seat belts, safety helmets, smoke detectors, carbon dioxide detectors, and smoking near bedding or upholstery  · Exercise: the importance of regular exercise/physical activity was discussed  Recommend exercise 3-5 times per week for at least 30 minutes  Return in about 1 year (around 11/2/2023)  Chief Complaint:     Chief Complaint   Patient presents with   • Physical Exam      History of Present Illness:     Adult Annual Physical   Patient here for a comprehensive physical exam  The patient reports problems - L foot pain  She has had left foot pain x2 months  She tried soaking her feet in Epsom salt and warm water with minimal improvement  No acute injury or inciting event  She has tried changing her shoes  She works for a [employer] which involves standing and sitting  She has 2 children age 12 and 8  Diet and Physical Activity  · Diet/Nutrition: well balanced diet  Cooks at home  · Exercise: walking  Depression Screening  PHQ-2/9 Depression Screening    Little interest or pleasure in doing things: 0 - not at all  Feeling down, depressed, or hopeless: 0 - not at all  PHQ-2 Score: 0  PHQ-2 Interpretation: Negative depression screen       General Health  · Sleep: sleeps well  Works 1st shift  · Hearing: normal - bilateral   · Vision: no vision problems  · Dental: regular dental visits  /GYN Health  · Patient is: perimenopausal  · Last menstrual period:  July 18th  · Contraceptive method: none  Review of Systems:     Review of Systems   Constitutional: Negative for chills and fever  HENT: Negative for ear pain and sore throat      Eyes: Negative for pain and visual disturbance  Respiratory: Negative for cough and shortness of breath  Cardiovascular: Negative for chest pain and palpitations  Gastrointestinal: Negative for abdominal pain and vomiting  Genitourinary: Negative for dysuria and hematuria  Musculoskeletal: Negative for arthralgias and back pain  Left foot pain   Skin: Negative for color change and rash  Neurological: Negative for seizures and syncope  All other systems reviewed and are negative       Past Medical History:     Past Medical History:   Diagnosis Date   • Peritonsillar abscess 7/1/2021   • Pre-diabetes    • Thyroid nodule       Past Surgical History:     Past Surgical History:   Procedure Laterality Date   • TUBAL LIGATION     • WISDOM TOOTH EXTRACTION        Social History:     Social History     Socioeconomic History   • Marital status: Registered Domestic Partner     Spouse name: None   • Number of children: None   • Years of education: None   • Highest education level: None   Occupational History   • None   Tobacco Use   • Smoking status: Never Smoker   • Smokeless tobacco: Never Used   Vaping Use   • Vaping Use: Never used   Substance and Sexual Activity   • Alcohol use: No   • Drug use: No   • Sexual activity: Yes     Partners: Male     Birth control/protection: None   Other Topics Concern   • None   Social History Narrative    Single    2 children    Always uses seat belt    Caffeine use     Social Determinants of Health     Financial Resource Strain: Not on file   Food Insecurity: Not on file   Transportation Needs: Not on file   Physical Activity: Not on file   Stress: Not on file   Social Connections: Not on file   Intimate Partner Violence: Not on file   Housing Stability: Not on file      Family History:     Family History   Problem Relation Age of Onset   • Diabetes Mother    • Breast cancer Mother 68   • No Known Problems Father    • No Known Problems Sister    • No Known Problems Sister    • No Known Problems Sister • No Known Problems Daughter    • No Known Problems Maternal Grandmother    • No Known Problems Maternal Grandfather    • No Known Problems Paternal Grandmother    • No Known Problems Paternal Grandfather    • Breast cancer Maternal Aunt 69   • Breast cancer Maternal Aunt 65   • Breast cancer Maternal Aunt 62   • Breast cancer Maternal Aunt 55   • No Known Problems Maternal Aunt    • No Known Problems Maternal Aunt    • Breast cancer Paternal Aunt         unknown age   • No Known Problems Paternal Aunt    • No Known Problems Paternal Aunt    • No Known Problems Paternal Aunt    • No Known Problems Paternal Aunt    • No Known Problems Paternal Aunt    • No Known Problems Paternal Aunt       Current Medications:     Current Outpatient Medications   Medication Sig Dispense Refill   • acetaminophen (TYLENOL) 650 mg CR tablet Take 1 tablet (650 mg total) by mouth every 8 (eight) hours as needed for mild pain 30 tablet 0   • cholecalciferol 2000 units TABS Take 1 tablet (2,000 Units total) by mouth daily 90 tablet 3   • clotrimazole-betamethasone (LOTRISONE) 1-0 05 % cream Apply topically 2 (two) times a day 30 g 2   • meloxicam (Mobic) 15 mg tablet Take 1 tablet (15 mg total) by mouth daily 14 tablet 0     No current facility-administered medications for this visit  Allergies: Allergies   Allergen Reactions   • Penicillins Swelling      Physical Exam:     /78 (BP Location: Left arm)   Pulse 84   Temp 99 9 °F (37 7 °C) (Tympanic)   Resp 20   Ht 5' 7" (1 702 m)   Wt 90 4 kg (199 lb 3 2 oz)   LMP 07/18/2022 (Exact Date)   SpO2 98%   Breastfeeding No   BMI 31 20 kg/m²     Physical Exam  Vitals and nursing note reviewed  Constitutional:       General: She is not in acute distress  Appearance: She is well-developed  HENT:      Head: Normocephalic and atraumatic        Right Ear: Tympanic membrane, ear canal and external ear normal       Left Ear: Tympanic membrane, ear canal and external ear normal    Eyes:      Conjunctiva/sclera: Conjunctivae normal    Cardiovascular:      Rate and Rhythm: Normal rate and regular rhythm  Heart sounds: No murmur heard  Pulmonary:      Effort: Pulmonary effort is normal  No respiratory distress  Breath sounds: Normal breath sounds  Abdominal:      Palpations: Abdomen is soft  Tenderness: There is no abdominal tenderness  Musculoskeletal:         General: Tenderness (Left plantar aspect of heel) present  No swelling  Normal range of motion  Cervical back: Neck supple  Skin:     General: Skin is warm and dry  Neurological:      Mental Status: She is alert            Nandini Tabares PA-C  [address]

## 2022-11-04 PROBLEM — J02.9 PHARYNGITIS: Status: RESOLVED | Noted: 2021-07-01 | Resolved: 2022-11-04

## 2022-11-04 PROBLEM — J36 PERITONSILLAR ABSCESS: Status: RESOLVED | Noted: 2021-07-01 | Resolved: 2022-11-04

## 2022-11-04 PROBLEM — U07.1 COVID-19 VIRUS INFECTION: Status: RESOLVED | Noted: 2021-12-02 | Resolved: 2022-11-04

## 2022-11-04 PROBLEM — L25.9 CONTACT DERMATITIS: Status: RESOLVED | Noted: 2021-11-01 | Resolved: 2022-11-04

## 2022-11-04 PROBLEM — Z11.59 ENCOUNTER FOR HEPATITIS C SCREENING TEST FOR LOW RISK PATIENT: Status: RESOLVED | Noted: 2021-11-01 | Resolved: 2022-11-04

## 2022-11-04 PROBLEM — Z91.89 INCREASED RISK OF BREAST CANCER: Status: RESOLVED | Noted: 2022-01-21 | Resolved: 2022-11-04

## 2022-11-04 NOTE — ASSESSMENT & PLAN NOTE
BMI Counseling: Body mass index is 31 2 kg/m²  The BMI is above normal  Nutrition recommendations include reducing portion sizes and 3-5 servings of fruits/vegetables daily  Exercise recommendations include exercising 3-5 times per week

## 2022-11-04 NOTE — ASSESSMENT & PLAN NOTE
2 4 cm left mid gland nodule per ultrasound 11/2021  Repeat ultrasound     Lab Results   Component Value Date    [id number] 2 094 11/03/2021

## 2022-11-04 NOTE — ASSESSMENT & PLAN NOTE
Lab Results   Component Value Date    LDLCALC 111 (H) 11/03/2021     Low ASCVD risk score  Continue diet and exercise    Repeat lab

## 2023-01-24 ENCOUNTER — TELEPHONE (OUTPATIENT)
Dept: HEMATOLOGY ONCOLOGY | Facility: CLINIC | Age: 48
End: 2023-01-24

## 2023-01-24 NOTE — TELEPHONE ENCOUNTER
Appointment Cancellation Or Reschedule     Person calling in Patient   If someone other than patient calling, are they listed on the communication consent form? N/A   Provider Rolanda Lucero, [address]   Office Visit Date and Time  1/27/23 930 AM    Office Visit Location [place]   Did patient want to reschedule their office appointment? If so, when was it scheduled to? Yes   2/6/23 3:00 PM   Did you have STAR scheduled for this appointment? No   Do you need STAR set up for your new appointment? If yes, please send to "PATIENT RIDESHARE" pool for STAR rescheduling No   Is this patient calling to reschedule an infusion appointment? No   When is their next infusion appointment? n/a   Is this patient a Chemo patient? No   Reason for Cancellation or Reschedule Patient work schedule has changed, patients request afternoon appointment  If the patient is cancelling an appointment and needs their STAR Transport cancelled, please route to [facility] [address]  If the patient is a treatment patient, please route this to the office nurse  If the patient is not on treatment, please route to the Clerical pool based on location  If the patient is a surgical oncology patient, please route to surg/onc clinical pool  Route message as high priority if same day cancellation

## 2023-02-06 ENCOUNTER — OFFICE VISIT (OUTPATIENT)
Dept: SURGICAL ONCOLOGY | Facility: CLINIC | Age: 48
End: 2023-02-06

## 2023-02-06 VITALS
WEIGHT: 204.4 LBS | RESPIRATION RATE: 16 BRPM | SYSTOLIC BLOOD PRESSURE: 116 MMHG | HEIGHT: 67 IN | DIASTOLIC BLOOD PRESSURE: 72 MMHG | BODY MASS INDEX: 32.08 KG/M2 | OXYGEN SATURATION: 98 % | TEMPERATURE: 98.1 F | HEART RATE: 85 BPM

## 2023-02-06 DIAGNOSIS — R92.2 DENSE BREASTS: ICD-10-CM

## 2023-02-06 DIAGNOSIS — Z80.3 FAMILY HISTORY OF BREAST CANCER: Primary | ICD-10-CM

## 2023-02-06 DIAGNOSIS — Z91.89 INCREASED RISK OF BREAST CANCER: ICD-10-CM

## 2023-02-06 NOTE — PROGRESS NOTES
Surgical Oncology Follow Up       [facility]  [facility] [id number]    Supriya Roseann  1975  [id number]      Chief Complaint   Patient presents with   • Office Visit       Assessment/Plan:  1  Family history of breast cancer  - MRI breast bilateral w and wo contrast w cad; Future    2  Dense breasts  - MRI breast bilateral w and wo contrast w cad; Future    3  Increased risk of breast cancer  - MRI breast bilateral w and wo contrast w cad; Future  - 1 year f/u visit    Discussion/Summary: Patient is a 24-year-old female that presents today for consultation regarding an increased risk of breast cancer, family history of breast cancer and dense breasts  She has a significant family history for breast cancer  I recommended genetic testing but this appt was patient never scheduled this appt  I again encouraged her to schedule this if she is interested in genetic testing  I have calculated her lifetime TC risk to be 55%  I recommended annual 3D mammography staggered with annual breast MRI and clinical breast exams every 6 months  She had a breast MRI in April of 2022 which was BIRADS 2  She had a bilateral 3d screening mammogram in June of 2022 which was BIRADS 1  No concerns on today's exam  She is already scheduled for a mammogram in June  I will therefore make arrangements for her breast MRI in December so her imaging studies are staggered  I will see her back in 1 year for another clinical exam  She will receive an exam with her GYN in the interim  All of her questions were answered today  History of Present Illness:     -Interval History: Patient presents today for a follow-up visit for an increased risk of breast cancer  She notices no changes on her self breast exam   She reports no changes in her family history  She had a breast MRI and mammogram which were benign      Review of Systems:  Review of Systems Constitutional: Negative for chills, fatigue and fever  Respiratory: Negative for cough and shortness of breath  Cardiovascular: Negative for chest pain  Hematological: Negative for adenopathy  Psychiatric/Behavioral: Negative for confusion  Patient Active Problem List   Diagnosis   • Lumbar radiculopathy   • Polyarthralgia   • Vitamin D deficiency   • Prediabetes   • Thyroid nodule   • Other fatigue   • Mixed hyperlipidemia   • Increased risk of breast cancer   • Family history of breast cancer   • Dense breasts   • Cyst of right ovary   • Herniated nucleus pulposus, L2-3 left   • Obesity (BMI 30-39  9)     Past Medical History:   Diagnosis Date   • Peritonsillar abscess 7/1/2021   • Pre-diabetes    • Thyroid nodule      Past Surgical History:   Procedure Laterality Date   • TUBAL LIGATION     • WISDOM TOOTH EXTRACTION       Family History   Problem Relation Age of Onset   • Diabetes Mother    • Breast cancer Mother 68   • No Known Problems Father    • No Known Problems Sister    • No Known Problems Sister    • No Known Problems Sister    • No Known Problems Daughter    • No Known Problems Maternal Grandmother    • No Known Problems Maternal Grandfather    • No Known Problems Paternal Grandmother    • No Known Problems Paternal Grandfather    • Breast cancer Maternal Aunt 69   • Breast cancer Maternal Aunt 65   • Breast cancer Maternal Aunt 62   • Breast cancer Maternal Aunt 55   • No Known Problems Maternal Aunt    • No Known Problems Maternal Aunt    • Breast cancer Paternal Aunt         unknown age   • No Known Problems Paternal Aunt    • No Known Problems Paternal Aunt    • No Known Problems Paternal Aunt    • No Known Problems Paternal Aunt    • No Known Problems Paternal Aunt    • No Known Problems Paternal Aunt      Social History     Socioeconomic History   • Marital status: Registered Domestic Partner     Spouse name: Not on file   • Number of children: Not on file   • Years of education: Not on file   • Highest education level: Not on file   Occupational History   • Not on file   Tobacco Use   • Smoking status: Never   • Smokeless tobacco: Never   Vaping Use   • Vaping Use: Never used   Substance and Sexual Activity   • Alcohol use: No   • Drug use: No   • Sexual activity: Yes     Partners: Male     Birth control/protection: None   Other Topics Concern   • Not on file   Social History Narrative    Single    2 children    Always uses seat belt    Caffeine use     Social Determinants of Health     Financial Resource Strain: Not on file   Food Insecurity: Not on file   Transportation Needs: Not on file   Physical Activity: Not on file   Stress: Not on file   Social Connections: Not on file   Intimate Partner Violence: Not on file   Housing Stability: Not on file       Current Outpatient Medications:   •  acetaminophen (TYLENOL) 650 mg CR tablet, Take 1 tablet (650 mg total) by mouth every 8 (eight) hours as needed for mild pain, Disp: 30 tablet, Rfl: 0  •  cholecalciferol 2000 units TABS, Take 1 tablet (2,000 Units total) by mouth daily, Disp: 90 tablet, Rfl: 3  •  clotrimazole-betamethasone (LOTRISONE) 1-0 05 % cream, Apply topically 2 (two) times a day, Disp: 30 g, Rfl: 2  •  meloxicam (Mobic) 15 mg tablet, Take 1 tablet (15 mg total) by mouth daily (Patient not taking: Reported on 2/6/2023), Disp: 14 tablet, Rfl: 0  Allergies   Allergen Reactions   • Penicillins Swelling     Vitals:    02/06/23 1501   BP: 116/72   Pulse: 85   Resp: 16   Temp: 98 1 °F (36 7 °C)   SpO2: 98%       Physical Exam  Vitals reviewed  Constitutional:       Appearance: Normal appearance  HENT:      Head: Normocephalic and atraumatic  Pulmonary:      Effort: Pulmonary effort is normal    Chest:   Breasts:     Right: No swelling, bleeding, inverted nipple, mass, nipple discharge, skin change or tenderness  Left: No swelling, bleeding, inverted nipple, mass, nipple discharge, skin change or tenderness  Lymphadenopathy:      Upper Body:      Right upper body: No supraclavicular or axillary adenopathy  Left upper body: No supraclavicular or axillary adenopathy  Neurological:      General: No focal deficit present  Mental Status: She is alert and oriented to person, place, and time  Psychiatric:         Mood and Affect: Mood normal            Advance Care Planning/Advance Directives:  Discussed disease status and treatment goals with the patient

## 2023-02-17 ENCOUNTER — APPOINTMENT (OUTPATIENT)
Dept: LAB | Facility: CLINIC | Age: 48
End: 2023-02-17

## 2023-02-17 DIAGNOSIS — R73.03 PRE-DIABETES: ICD-10-CM

## 2023-02-17 DIAGNOSIS — E55.9 VITAMIN D DEFICIENCY: ICD-10-CM

## 2023-02-17 DIAGNOSIS — E78.2 MIXED HYPERLIPIDEMIA: ICD-10-CM

## 2023-02-17 DIAGNOSIS — E04.1 THYROID NODULE: ICD-10-CM

## 2023-02-17 DIAGNOSIS — Z00.00 ENCOUNTER FOR ROUTINE ADULT HEALTH EXAMINATION WITHOUT ABNORMAL FINDINGS: ICD-10-CM

## 2023-02-17 LAB
25(OH)D3 SERPL-MCNC: 12.4 NG/ML (ref 30–100)
BASOPHILS # BLD AUTO: 0.05 THOUSANDS/ÂΜL (ref 0–0.1)
BASOPHILS NFR BLD AUTO: 1 % (ref 0–1)
EOSINOPHIL # BLD AUTO: 0.39 THOUSAND/ÂΜL (ref 0–0.61)
EOSINOPHIL NFR BLD AUTO: 5 % (ref 0–6)
ERYTHROCYTE [DISTWIDTH] IN BLOOD BY AUTOMATED COUNT: 13.7 % (ref 11.6–15.1)
HCT VFR BLD AUTO: 46.5 % (ref 34.8–46.1)
HGB BLD-MCNC: 14.4 G/DL (ref 11.5–15.4)
IMM GRANULOCYTES # BLD AUTO: 0.01 THOUSAND/UL (ref 0–0.2)
IMM GRANULOCYTES NFR BLD AUTO: 0 % (ref 0–2)
LYMPHOCYTES # BLD AUTO: 2.77 THOUSANDS/ÂΜL (ref 0.6–4.47)
LYMPHOCYTES NFR BLD AUTO: 36 % (ref 14–44)
MCH RBC QN AUTO: 27.4 PG (ref 26.8–34.3)
MCHC RBC AUTO-ENTMCNC: 31 G/DL (ref 31.4–37.4)
MCV RBC AUTO: 88 FL (ref 82–98)
MONOCYTES # BLD AUTO: 0.44 THOUSAND/ÂΜL (ref 0.17–1.22)
MONOCYTES NFR BLD AUTO: 6 % (ref 4–12)
NEUTROPHILS # BLD AUTO: 4.13 THOUSANDS/ÂΜL (ref 1.85–7.62)
NEUTS SEG NFR BLD AUTO: 52 % (ref 43–75)
NRBC BLD AUTO-RTO: 0 /100 WBCS
PLATELET # BLD AUTO: 311 THOUSANDS/UL (ref 149–390)
PMV BLD AUTO: 10.3 FL (ref 8.9–12.7)
RBC # BLD AUTO: 5.26 MILLION/UL (ref 3.81–5.12)
WBC # BLD AUTO: 7.79 THOUSAND/UL (ref 4.31–10.16)

## 2023-02-18 LAB
ALBUMIN SERPL BCP-MCNC: 4.1 G/DL (ref 3.5–5)
ALP SERPL-CCNC: 72 U/L (ref 46–116)
ALT SERPL W P-5'-P-CCNC: 38 U/L (ref 12–78)
ANION GAP SERPL CALCULATED.3IONS-SCNC: 6 MMOL/L (ref 4–13)
AST SERPL W P-5'-P-CCNC: 22 U/L (ref 5–45)
BILIRUB SERPL-MCNC: 1.18 MG/DL (ref 0.2–1)
BUN SERPL-MCNC: 9 MG/DL (ref 5–25)
CALCIUM SERPL-MCNC: 9.4 MG/DL (ref 8.3–10.1)
CHLORIDE SERPL-SCNC: 107 MMOL/L (ref 96–108)
CHOLEST SERPL-MCNC: 155 MG/DL
CO2 SERPL-SCNC: 24 MMOL/L (ref 21–32)
CREAT SERPL-MCNC: 0.62 MG/DL (ref 0.6–1.3)
GFR SERPL CREATININE-BSD FRML MDRD: 109 ML/MIN/1.73SQ M
GLUCOSE P FAST SERPL-MCNC: 107 MG/DL (ref 65–99)
HDLC SERPL-MCNC: 48 MG/DL
LDLC SERPL CALC-MCNC: 86 MG/DL (ref 0–100)
NONHDLC SERPL-MCNC: 107 MG/DL
POTASSIUM SERPL-SCNC: 3.9 MMOL/L (ref 3.5–5.3)
PROT SERPL-MCNC: 7.6 G/DL (ref 6.4–8.4)
SODIUM SERPL-SCNC: 137 MMOL/L (ref 135–147)
TRIGL SERPL-MCNC: 105 MG/DL

## 2023-02-19 DIAGNOSIS — E55.9 VITAMIN D DEFICIENCY: ICD-10-CM

## 2023-02-20 LAB
EST. AVERAGE GLUCOSE BLD GHB EST-MCNC: 128 MG/DL
HBA1C MFR BLD: 6.1 %

## 2023-02-21 DIAGNOSIS — E55.9 VITAMIN D DEFICIENCY: Primary | ICD-10-CM

## 2023-02-21 RX ORDER — ERGOCALCIFEROL 1.25 MG/1
50000 CAPSULE ORAL WEEKLY
Qty: 12 CAPSULE | Refills: 1 | Status: SHIPPED | OUTPATIENT
Start: 2023-02-21 | End: 2023-08-15

## 2023-03-22 ENCOUNTER — APPOINTMENT (OUTPATIENT)
Dept: LAB | Facility: HOSPITAL | Age: 48
End: 2023-03-22

## 2023-03-22 DIAGNOSIS — R73.03 DIABETES MELLITUS, LATENT: ICD-10-CM

## 2023-03-22 LAB
BASOPHILS # BLD AUTO: 0.04 THOUSANDS/ÂΜL (ref 0–0.1)
BASOPHILS NFR BLD AUTO: 0 % (ref 0–1)
EOSINOPHIL # BLD AUTO: 0.29 THOUSAND/ÂΜL (ref 0–0.61)
EOSINOPHIL NFR BLD AUTO: 3 % (ref 0–6)
ERYTHROCYTE [DISTWIDTH] IN BLOOD BY AUTOMATED COUNT: 13.1 % (ref 11.6–15.1)
HCT VFR BLD AUTO: 44 % (ref 34.8–46.1)
HGB BLD-MCNC: 14.2 G/DL (ref 11.5–15.4)
IMM GRANULOCYTES # BLD AUTO: 0.03 THOUSAND/UL (ref 0–0.2)
IMM GRANULOCYTES NFR BLD AUTO: 0 % (ref 0–2)
LYMPHOCYTES # BLD AUTO: 3.24 THOUSANDS/ÂΜL (ref 0.6–4.47)
LYMPHOCYTES NFR BLD AUTO: 33 % (ref 14–44)
MCH RBC QN AUTO: 27.5 PG (ref 26.8–34.3)
MCHC RBC AUTO-ENTMCNC: 32.3 G/DL (ref 31.4–37.4)
MCV RBC AUTO: 85 FL (ref 82–98)
MONOCYTES # BLD AUTO: 0.56 THOUSAND/ÂΜL (ref 0.17–1.22)
MONOCYTES NFR BLD AUTO: 6 % (ref 4–12)
NEUTROPHILS # BLD AUTO: 5.76 THOUSANDS/ÂΜL (ref 1.85–7.62)
NEUTS SEG NFR BLD AUTO: 58 % (ref 43–75)
NRBC BLD AUTO-RTO: 0 /100 WBCS
PLATELET # BLD AUTO: 282 THOUSANDS/UL (ref 149–390)
PMV BLD AUTO: 10.3 FL (ref 8.9–12.7)
RBC # BLD AUTO: 5.16 MILLION/UL (ref 3.81–5.12)
TSH SERPL DL<=0.05 MIU/L-ACNC: 1.7 UIU/ML (ref 0.45–4.5)
WBC # BLD AUTO: 9.92 THOUSAND/UL (ref 4.31–10.16)

## 2023-03-23 LAB — THYROPEROXIDASE AB SERPL-ACNC: <9 IU/ML (ref 0–34)

## 2023-06-19 ENCOUNTER — HOSPITAL ENCOUNTER (OUTPATIENT)
Dept: RADIOLOGY | Age: 48
Discharge: HOME | End: 2023-06-19
Payer: COMMERCIAL

## 2023-06-19 VITALS — BODY MASS INDEX: 32.08 KG/M2 | WEIGHT: 204.37 LBS | HEIGHT: 67 IN

## 2023-06-19 DIAGNOSIS — Z12.31 ENCOUNTER FOR SCREENING MAMMOGRAM FOR BREAST CANCER: ICD-10-CM

## 2023-06-19 PROCEDURE — 77063 BREAST TOMOSYNTHESIS BI: CPT

## 2023-06-19 PROCEDURE — 77067 SCR MAMMO BI INCL CAD: CPT

## 2023-06-26 ENCOUNTER — ANNUAL EXAM (OUTPATIENT)
Dept: GYNECOLOGY | Facility: CLINIC | Age: 48
End: 2023-06-26
Payer: COMMERCIAL

## 2023-06-26 VITALS
WEIGHT: 182.8 LBS | BODY MASS INDEX: 28.69 KG/M2 | DIASTOLIC BLOOD PRESSURE: 70 MMHG | SYSTOLIC BLOOD PRESSURE: 120 MMHG | HEIGHT: 67 IN

## 2023-06-26 DIAGNOSIS — Z01.419 ENCOUNTER FOR ANNUAL ROUTINE GYNECOLOGICAL EXAMINATION: Primary | ICD-10-CM

## 2023-06-26 DIAGNOSIS — M54.16 LUMBAR RADICULOPATHY: ICD-10-CM

## 2023-06-26 DIAGNOSIS — Z12.11 SCREENING FOR COLON CANCER: ICD-10-CM

## 2023-06-26 DIAGNOSIS — Z98.51 HISTORY OF BILATERAL TUBAL LIGATION: ICD-10-CM

## 2023-06-26 DIAGNOSIS — R92.2 DENSE BREASTS: ICD-10-CM

## 2023-06-26 DIAGNOSIS — Z12.31 ENCOUNTER FOR SCREENING MAMMOGRAM FOR BREAST CANCER: ICD-10-CM

## 2023-06-26 DIAGNOSIS — Z80.3 FAMILY HISTORY OF BREAST CANCER: ICD-10-CM

## 2023-06-26 PROCEDURE — 99396 PREV VISIT EST AGE 40-64: CPT | Performed by: OBSTETRICS & GYNECOLOGY

## 2023-06-26 RX ORDER — SEMAGLUTIDE 1.7 MG/.75ML
INJECTION, SOLUTION SUBCUTANEOUS
COMMUNITY
Start: 2023-06-14

## 2023-06-26 NOTE — PROGRESS NOTES
"Assessment   Annual well woman exam  History of tubal ligation  History of dense breast tissue on screening mammogram  Family history of breast cancer, mother, maternal aunt  History of lumbar radiculopathy        Plan   Screening mammogram ordered  Colon cancer screening, Cologuard ordered, instructions reviewed  Weight management, diet and exercise reviewed  Patient recently started Tala [employer] has lost approximately 20 pounds over the past 6 months  No new GYN complaints or concerns   All questions answered  Await pap smear results  Subjective  Here for annual exam     Samaria Will is a 57 y o  female who presents for annual exam  Periods are regular every 28-30 days, lasting 5 days  Dysmenorrhea:none  Cyclic symptoms include none  No intermenstrual bleeding, spotting, or discharge  The patient reports that there is not domestic violence in her life  Current contraception: tubal ligation  History of abnormal Pap smear: no  Family history of uterine or ovarian cancer: no  Regular self breast exam: yes  History of abnormal mammogram: yes -history of dense breast tissue on screening mammogram  Family history of breast cancer: yes -maternal grandmother  History of abnormal lipids: no  Menstrual History:  OB History        2    Para   2    Term   2            AB        Living           SAB        IAB        Ectopic        Multiple        Live Births   2                Menarche age: 17  Patient's last menstrual period was 2023 (approximate)  Review of Systems  Pertinent items are noted in HPI        Objective no acute distress     /70 (BP Location: Right arm, Patient Position: Sitting, Cuff Size: Standard)   Ht 5' 7\" (1 702 m)   Wt 82 9 kg (182 lb 12 8 oz)   LMP 2023 (Approximate)   BMI 28 63 kg/m²     General:   alert and oriented, in no acute distress, alert, appears stated age and cooperative   Heart: regular rate and rhythm, S1, S2 normal, no murmur, click, rub or " gallop   Lungs: clear to auscultation bilaterally   Abdomen: soft, non-tender, without masses or organomegaly   Vulva: normal, Bartholin's, Urethra, [place]'s normal, female escutcheon   Vagina: normal mucosa, normal discharge, no palpable nodules   Cervix: anteverted, multiparous appearance, no bleeding following Pap, no cervical motion tenderness and no lesions   Uterus: normal size, anteverted, mobile, non-tender, normal shape and consistency   Adnexa: normal adnexa and no mass, fullness, tenderness   Bilateral breast exam in the sitting and supine position with chaperone present, no visible or palpable breast lesions identified  No breast masses noted  No supraclavicular or axillary lymphadenopathy noted  No nipple discharge  Reviewed self-breast exam techniques     Rectal exam,  deferred

## 2023-06-29 LAB
CYTOLOGIST CVX/VAG CYTO: NORMAL
DX ICD CODE: NORMAL
HPV GENOTYPE REFLEX: NORMAL
HPV I/H RISK 4 DNA CVX QL PROBE+SIG AMP: NEGATIVE
OTHER STN SPEC: NORMAL
PATH REPORT.FINAL DX SPEC: NORMAL
SL AMB NOTE:: NORMAL
SL AMB SPECIMEN ADEQUACY: NORMAL
SL AMB TEST METHODOLOGY: NORMAL

## 2023-07-03 ENCOUNTER — HOSPITAL ENCOUNTER (OUTPATIENT)
Dept: ULTRASOUND IMAGING | Facility: HOSPITAL | Age: 48
Discharge: HOME | End: 2023-07-03
Payer: COMMERCIAL

## 2023-07-03 DIAGNOSIS — E04.1 THYROID NODULE: ICD-10-CM

## 2023-07-03 PROCEDURE — 76536 US EXAM OF HEAD AND NECK: CPT

## 2023-07-10 LAB — COLOGUARD RESULT REPORTABLE: NEGATIVE

## 2023-07-11 PROBLEM — M25.50 POLYARTHRALGIA: Status: RESOLVED | Noted: 2018-06-25 | Resolved: 2023-07-11

## 2023-07-11 PROBLEM — Z83.3 FAMILY HISTORY OF DIABETES MELLITUS TYPE II: Status: ACTIVE | Noted: 2023-04-27

## 2023-07-11 PROBLEM — R53.83 OTHER FATIGUE: Status: RESOLVED | Noted: 2021-11-01 | Resolved: 2023-07-11

## 2023-07-11 PROBLEM — R17 ELEVATED BILIRUBIN: Status: ACTIVE | Noted: 2023-04-27

## 2023-07-11 PROBLEM — E78.2 MIXED HYPERLIPIDEMIA: Status: RESOLVED | Noted: 2021-11-01 | Resolved: 2023-07-11

## 2023-08-02 ENCOUNTER — APPOINTMENT (OUTPATIENT)
Dept: LAB | Facility: CLINIC | Age: 48
End: 2023-08-02
Payer: COMMERCIAL

## 2023-08-02 DIAGNOSIS — R73.03 DIABETES MELLITUS, LATENT: ICD-10-CM

## 2023-08-02 DIAGNOSIS — E55.9 VITAMIN D DEFICIENCY: ICD-10-CM

## 2023-08-02 DIAGNOSIS — Z76.89 MENSTRUAL EXTRACTION: ICD-10-CM

## 2023-08-02 DIAGNOSIS — E66.3 OVERWEIGHT: ICD-10-CM

## 2023-08-02 LAB
25(OH)D3 SERPL-MCNC: 38.2 NG/ML (ref 30–100)
ALBUMIN SERPL BCP-MCNC: 3.8 G/DL (ref 3.5–5)
ALP SERPL-CCNC: 77 U/L (ref 46–116)
ALT SERPL W P-5'-P-CCNC: 31 U/L (ref 12–78)
ANION GAP SERPL CALCULATED.3IONS-SCNC: 2 MMOL/L
AST SERPL W P-5'-P-CCNC: 22 U/L (ref 5–45)
BILIRUB SERPL-MCNC: 1.14 MG/DL (ref 0.2–1)
BUN SERPL-MCNC: 16 MG/DL (ref 5–25)
CALCIUM SERPL-MCNC: 8.9 MG/DL (ref 8.3–10.1)
CHLORIDE SERPL-SCNC: 110 MMOL/L (ref 96–108)
CHOLEST SERPL-MCNC: 154 MG/DL
CO2 SERPL-SCNC: 28 MMOL/L (ref 21–32)
CREAT SERPL-MCNC: 0.84 MG/DL (ref 0.6–1.3)
EST. AVERAGE GLUCOSE BLD GHB EST-MCNC: 108 MG/DL
GFR SERPL CREATININE-BSD FRML MDRD: 83 ML/MIN/1.73SQ M
GLUCOSE P FAST SERPL-MCNC: 83 MG/DL (ref 65–99)
HBA1C MFR BLD: 5.4 %
HDLC SERPL-MCNC: 54 MG/DL
LDLC SERPL CALC-MCNC: 83 MG/DL (ref 0–100)
NONHDLC SERPL-MCNC: 100 MG/DL
POTASSIUM SERPL-SCNC: 3.7 MMOL/L (ref 3.5–5.3)
PROT SERPL-MCNC: 7.2 G/DL (ref 6.4–8.4)
SODIUM SERPL-SCNC: 140 MMOL/L (ref 135–147)
TRIGL SERPL-MCNC: 85 MG/DL

## 2023-08-02 PROCEDURE — 82306 VITAMIN D 25 HYDROXY: CPT

## 2023-08-02 PROCEDURE — 83036 HEMOGLOBIN GLYCOSYLATED A1C: CPT

## 2023-08-02 PROCEDURE — 36415 COLL VENOUS BLD VENIPUNCTURE: CPT

## 2023-08-02 PROCEDURE — 80061 LIPID PANEL: CPT

## 2023-08-02 PROCEDURE — 80053 COMPREHEN METABOLIC PANEL: CPT

## 2023-08-15 DIAGNOSIS — E55.9 VITAMIN D DEFICIENCY: ICD-10-CM

## 2023-08-15 RX ORDER — ERGOCALCIFEROL 1.25 MG/1
50000 CAPSULE ORAL WEEKLY
Qty: 12 CAPSULE | Refills: 1 | Status: SHIPPED | OUTPATIENT
Start: 2023-08-15

## 2023-09-11 DIAGNOSIS — B35.4 TINEA CORPORIS: ICD-10-CM

## 2023-09-11 RX ORDER — CLOTRIMAZOLE AND BETAMETHASONE DIPROPIONATE 10; .64 MG/G; MG/G
CREAM TOPICAL 2 TIMES DAILY
Qty: 30 G | Refills: 0 | OUTPATIENT
Start: 2023-09-11

## 2023-10-31 ENCOUNTER — APPOINTMENT (OUTPATIENT)
Dept: LAB | Facility: CLINIC | Age: 48
End: 2023-10-31
Payer: COMMERCIAL

## 2023-10-31 DIAGNOSIS — E55.9 AVITAMINOSIS D: ICD-10-CM

## 2023-10-31 DIAGNOSIS — E04.1 NONTOXIC UNINODULAR GOITER: ICD-10-CM

## 2023-10-31 DIAGNOSIS — R73.03 DIABETES MELLITUS, LATENT: ICD-10-CM

## 2023-10-31 LAB
25(OH)D3 SERPL-MCNC: 65.8 NG/ML (ref 30–100)
ALBUMIN SERPL BCP-MCNC: 4.6 G/DL (ref 3.5–5)
ALP SERPL-CCNC: 60 U/L (ref 34–104)
ALT SERPL W P-5'-P-CCNC: 12 U/L (ref 7–52)
ANION GAP SERPL CALCULATED.3IONS-SCNC: 6 MMOL/L
AST SERPL W P-5'-P-CCNC: 14 U/L (ref 13–39)
BASOPHILS # BLD AUTO: 0.02 THOUSANDS/ÂΜL (ref 0–0.1)
BASOPHILS NFR BLD AUTO: 0 % (ref 0–1)
BILIRUB SERPL-MCNC: 1.68 MG/DL (ref 0.2–1)
BUN SERPL-MCNC: 13 MG/DL (ref 5–25)
CALCIUM SERPL-MCNC: 9.2 MG/DL (ref 8.4–10.2)
CHLORIDE SERPL-SCNC: 105 MMOL/L (ref 96–108)
CHOLEST SERPL-MCNC: 158 MG/DL
CO2 SERPL-SCNC: 29 MMOL/L (ref 21–32)
CREAT SERPL-MCNC: 0.52 MG/DL (ref 0.6–1.3)
EOSINOPHIL # BLD AUTO: 0.17 THOUSAND/ÂΜL (ref 0–0.61)
EOSINOPHIL NFR BLD AUTO: 2 % (ref 0–6)
ERYTHROCYTE [DISTWIDTH] IN BLOOD BY AUTOMATED COUNT: 13.6 % (ref 11.6–15.1)
EST. AVERAGE GLUCOSE BLD GHB EST-MCNC: 111 MG/DL
GFR SERPL CREATININE-BSD FRML MDRD: 114 ML/MIN/1.73SQ M
GLUCOSE P FAST SERPL-MCNC: 82 MG/DL (ref 65–99)
HBA1C MFR BLD: 5.5 %
HCT VFR BLD AUTO: 43.5 % (ref 34.8–46.1)
HDLC SERPL-MCNC: 57 MG/DL
HGB BLD-MCNC: 14.3 G/DL (ref 11.5–15.4)
IMM GRANULOCYTES # BLD AUTO: 0.02 THOUSAND/UL (ref 0–0.2)
IMM GRANULOCYTES NFR BLD AUTO: 0 % (ref 0–2)
LDLC SERPL CALC-MCNC: 83 MG/DL (ref 0–100)
LYMPHOCYTES # BLD AUTO: 2.62 THOUSANDS/ÂΜL (ref 0.6–4.47)
LYMPHOCYTES NFR BLD AUTO: 37 % (ref 14–44)
MCH RBC QN AUTO: 28.3 PG (ref 26.8–34.3)
MCHC RBC AUTO-ENTMCNC: 32.9 G/DL (ref 31.4–37.4)
MCV RBC AUTO: 86 FL (ref 82–98)
MONOCYTES # BLD AUTO: 0.36 THOUSAND/ÂΜL (ref 0.17–1.22)
MONOCYTES NFR BLD AUTO: 5 % (ref 4–12)
NEUTROPHILS # BLD AUTO: 3.83 THOUSANDS/ÂΜL (ref 1.85–7.62)
NEUTS SEG NFR BLD AUTO: 56 % (ref 43–75)
NONHDLC SERPL-MCNC: 101 MG/DL
NRBC BLD AUTO-RTO: 0 /100 WBCS
PLATELET # BLD AUTO: 286 THOUSANDS/UL (ref 149–390)
PMV BLD AUTO: 10.4 FL (ref 8.9–12.7)
POTASSIUM SERPL-SCNC: 3.8 MMOL/L (ref 3.5–5.3)
PROT SERPL-MCNC: 7.2 G/DL (ref 6.4–8.4)
RBC # BLD AUTO: 5.06 MILLION/UL (ref 3.81–5.12)
SODIUM SERPL-SCNC: 140 MMOL/L (ref 135–147)
TRIGL SERPL-MCNC: 89 MG/DL
TSH SERPL DL<=0.05 MIU/L-ACNC: 1.37 UIU/ML (ref 0.45–4.5)
WBC # BLD AUTO: 7.02 THOUSAND/UL (ref 4.31–10.16)

## 2023-10-31 PROCEDURE — 80061 LIPID PANEL: CPT

## 2023-10-31 PROCEDURE — 85025 COMPLETE CBC W/AUTO DIFF WBC: CPT

## 2023-10-31 PROCEDURE — 83036 HEMOGLOBIN GLYCOSYLATED A1C: CPT

## 2023-10-31 PROCEDURE — 82306 VITAMIN D 25 HYDROXY: CPT

## 2023-10-31 PROCEDURE — 36415 COLL VENOUS BLD VENIPUNCTURE: CPT

## 2023-10-31 PROCEDURE — 84443 ASSAY THYROID STIM HORMONE: CPT

## 2023-10-31 PROCEDURE — 80053 COMPREHEN METABOLIC PANEL: CPT

## 2023-11-07 ENCOUNTER — OFFICE VISIT (OUTPATIENT)
Dept: FAMILY MEDICINE CLINIC | Facility: CLINIC | Age: 48
End: 2023-11-07
Payer: COMMERCIAL

## 2023-11-07 VITALS
HEIGHT: 67 IN | HEART RATE: 83 BPM | SYSTOLIC BLOOD PRESSURE: 110 MMHG | BODY MASS INDEX: 26.56 KG/M2 | RESPIRATION RATE: 20 BRPM | OXYGEN SATURATION: 99 % | TEMPERATURE: 96 F | DIASTOLIC BLOOD PRESSURE: 70 MMHG | WEIGHT: 169.2 LBS

## 2023-11-07 DIAGNOSIS — Z00.00 ANNUAL PHYSICAL EXAM: Primary | ICD-10-CM

## 2023-11-07 DIAGNOSIS — B35.4 TINEA CORPORIS: ICD-10-CM

## 2023-11-07 DIAGNOSIS — R73.03 PREDIABETES: ICD-10-CM

## 2023-11-07 DIAGNOSIS — E55.9 VITAMIN D DEFICIENCY: ICD-10-CM

## 2023-11-07 PROCEDURE — 99396 PREV VISIT EST AGE 40-64: CPT | Performed by: PHYSICIAN ASSISTANT

## 2023-11-07 RX ORDER — CLOTRIMAZOLE AND BETAMETHASONE DIPROPIONATE 10; .64 MG/G; MG/G
CREAM TOPICAL 2 TIMES DAILY
Qty: 30 G | Refills: 2 | Status: SHIPPED | OUTPATIENT
Start: 2023-11-07

## 2023-11-07 NOTE — ASSESSMENT & PLAN NOTE
BMI Counseling: Body mass index is 26.5 kg/m². The BMI is above normal. Nutrition recommendations include reducing portion sizes, decreasing overall calorie intake, 3-5 servings of fruits/vegetables daily, reducing fast food intake, consuming healthier snacks, decreasing soda and/or juice intake, moderation in carbohydrate intake, increasing intake of lean protein, reducing intake of saturated fat and trans fat, and reducing intake of cholesterol. Exercise recommendations include exercising 3-5 times per week and strength training exercises. 30 [facility], continue with diet and exercise.

## 2023-11-07 NOTE — ASSESSMENT & PLAN NOTE
Lab Results   Component Value Date    HGBA1C 5.5 10/31/2023     Resolved with weight loss and semaglutide. Reviewed last endoconsult note 11/2/2023.

## 2023-11-07 NOTE — ASSESSMENT & PLAN NOTE
Significant improvement with vitamin D on high-dose supplement, complete current supply and restart vitamin D3 2000 IUs once daily.

## 2023-11-07 NOTE — PROGRESS NOTES
ADULT ANNUAL PHYSICAL  [address] [facility]    NAME: Kathy Ford  AGE: 57 y.o. SEX: female  : 1975     DATE: 2023     Assessment and Plan:     Problem List Items Addressed This Visit        Other    BMI 26.0-26.9,adult     BMI Counseling: Body mass index is 26.5 kg/m². The BMI is above normal. Nutrition recommendations include reducing portion sizes, decreasing overall calorie intake, 3-5 servings of fruits/vegetables daily, reducing fast food intake, consuming healthier snacks, decreasing soda and/or juice intake, moderation in carbohydrate intake, increasing intake of lean protein, reducing intake of saturated fat and trans fat, and reducing intake of cholesterol. Exercise recommendations include exercising 3-5 times per week and strength training exercises. 30 pound intentional weight loss since starting [facility] per endocrinology, continue with diet and exercise. Prediabetes     Lab Results   Component Value Date    HGBA1C 5.5 10/31/2023     Resolved with weight loss and semaglutide. Reviewed last endoconsult note 2023. Vitamin D deficiency     Significant improvement with vitamin D on high-dose supplement, complete current supply and restart vitamin D3 2000 IUs once daily. Other Visit Diagnoses     Annual physical exam    -  Primary    Tinea corporis        Refilled clotrimazole/betamethasone for use as needed, with previous improvement    Relevant Medications    clotrimazole-betamethasone (LOTRISONE) 1-0.05 % cream            Immunizations and preventive care screenings were discussed with patient today. Appropriate education was printed on patient's after visit summary. Counseling:  Alcohol/drug use: discussed moderation in alcohol intake, the recommendations for healthy alcohol use, and avoidance of illicit drug use.   Dental Health: discussed importance of regular tooth brushing, flossing, and dental visits. Injury prevention: discussed safety/seat belts, safety helmets, smoke detectors, carbon dioxide detectors, and smoking near bedding or upholstery. Sexual health: discussed sexually transmitted diseases, partner selection, use of condoms, avoidance of unintended pregnancy, and contraceptive alternatives. Exercise: the importance of regular exercise/physical activity was discussed. Recommend exercise 3-5 times per week for at least 30 minutes. Depression Screening and Follow-up Plan: Patient was screened for depression during today's encounter. They screened negative with a PHQ-2 score of 0. Return in about 1 year (around 11/7/2024) for Annual physical.     Chief Complaint:     Chief Complaint   Patient presents with   • Physical Exam      History of Present Illness:     Adult Annual Physical   Patient here for a comprehensive physical exam. The patient reports no problems. Feeling well, lost 30 pounds since starting on injections with endocrinology. No smoking or alcohol use. Recently had eye doctor appointment for reading glasses. No issues. Diet and Physical Activity  Diet/Nutrition: well balanced diet. Exercise: walking and 1-2 times a week on average. Depression Screening  PHQ-2/9 Depression Screening    Little interest or pleasure in doing things: 0 - not at all  Feeling down, depressed, or hopeless: 0 - not at all  PHQ-2 Score: 0  PHQ-2 Interpretation: Negative depression screen       General Health  Sleep: sleeps well. Hearing: normal - bilateral.  Vision: no vision problems and wears glasses. Reading   Dental: regular dental visits and brushes teeth twice daily. /GYN Health  Patient is: perimenopausal  Last menstrual period: 11/2022   Contraceptive method:  none . Advanced Care Planning  Do you have an advanced directive? no  Do you have a durable medical power of ?  no     Review of Systems:     Review of Systems   Constitutional:  Negative for chills and fever. HENT:  Negative for ear pain and sore throat. Eyes:  Negative for pain and visual disturbance. Respiratory:  Negative for cough and shortness of breath. Cardiovascular:  Negative for chest pain and palpitations. Gastrointestinal:  Negative for abdominal pain and vomiting. Genitourinary:  Negative for dysuria and hematuria. Musculoskeletal:  Negative for arthralgias and back pain. Skin:  Negative for color change and rash. Neurological:  Negative for seizures and syncope. All other systems reviewed and are negative.      Past Medical History:     Past Medical History:   Diagnosis Date   • Peritonsillar abscess 7/1/2021   • Pre-diabetes    • Thyroid nodule       Past Surgical History:     Past Surgical History:   Procedure Laterality Date   • TUBAL LIGATION     • WISDOM TOOTH EXTRACTION        Social History:     Social History     Socioeconomic History   • Marital status: Registered Domestic Partner     Spouse name: None   • Number of children: None   • Years of education: None   • Highest education level: None   Occupational History   • None   Tobacco Use   • Smoking status: Never   • Smokeless tobacco: Never   Vaping Use   • Vaping Use: Never used   Substance and Sexual Activity   • Alcohol use: No   • Drug use: No   • Sexual activity: Yes     Partners: Male     Birth control/protection: Surgical, None   Other Topics Concern   • None   Social History Narrative    Single    2 children    Always uses seat belt    Caffeine use     Social Determinants of Health     Financial Resource Strain: Not on file   Food Insecurity: Not on file   Transportation Needs: Not on file   Physical Activity: Not on file   Stress: Not on file   Social Connections: Not on file   Intimate Partner Violence: Not on file   Housing Stability: Not on file      Family History:     Family History   Problem Relation Age of Onset   • Diabetes Mother    • Breast cancer Mother    • No Known Problems Father • No Known Problems Sister    • No Known Problems Sister    • No Known Problems Sister    • No Known Problems Daughter    • No Known Problems Maternal Grandmother    • No Known Problems Maternal Grandfather    • No Known Problems Paternal Grandmother    • No Known Problems Paternal Grandfather    • Breast cancer Maternal Aunt 69   • Breast cancer Maternal Aunt 65   • Breast cancer Maternal Aunt 62   • Breast cancer Maternal Aunt 55   • No Known Problems Maternal Aunt    • No Known Problems Maternal Aunt    • Breast cancer Paternal Aunt         unknown age   • No Known Problems Paternal Aunt    • No Known Problems Paternal Aunt    • No Known Problems Paternal Aunt    • No Known Problems Paternal Aunt    • No Known Problems Paternal Aunt    • No Known Problems Paternal Aunt       Current Medications:     Current Outpatient Medications   Medication Sig Dispense Refill   • acetaminophen (TYLENOL) 650 mg CR tablet Take 1 tablet (650 mg total) by mouth every 8 (eight) hours as needed for mild pain 30 tablet 0   • Cholecalciferol 50 MCG (2000 UT) TABS Take 1 tablet (2,000 Units total) by mouth daily 90 tablet 3   • clotrimazole-betamethasone (LOTRISONE) 1-0.05 % cream Apply topically 2 (two) times a day 30 g 2   • Semaglutide-Weight Management (WEGOVY) 2.4 MG/0.75ML Inject 2.4 mg under the skin once a week       No current facility-administered medications for this visit. Allergies: Allergies   Allergen Reactions   • Penicillins Swelling      Physical Exam:     /70 (BP Location: Left arm, Patient Position: Sitting, Cuff Size: Standard)   Pulse 83   Temp (!) 96 °F (35.6 °C) (Temporal)   Resp 20   Ht 5' 7" (1.702 m)   Wt 76.7 kg (169 lb 3.2 oz)   SpO2 99%   BMI 26.50 kg/m²     Physical Exam  Vitals and nursing note reviewed. Constitutional:       General: She is not in acute distress. Appearance: She is well-developed. HENT:      Head: Normocephalic and atraumatic.       Right Ear: Tympanic membrane, ear canal and external ear normal.      Left Ear: Tympanic membrane, ear canal and external ear normal.      Mouth/Throat:      Mouth: Mucous membranes are moist.   Eyes:      Extraocular Movements: Extraocular movements intact. Conjunctiva/sclera: Conjunctivae normal.      Pupils: Pupils are equal, round, and reactive to light. Cardiovascular:      Rate and Rhythm: Normal rate and regular rhythm. Heart sounds: No murmur heard. Pulmonary:      Effort: Pulmonary effort is normal. No respiratory distress. Breath sounds: Normal breath sounds. Abdominal:      Palpations: Abdomen is soft. Tenderness: There is no abdominal tenderness. Musculoskeletal:         General: No swelling. Cervical back: Neck supple. Skin:     General: Skin is warm and dry. Capillary Refill: Capillary refill takes less than 2 seconds. Neurological:      Mental Status: She is alert.    Psychiatric:         Mood and Affect: Mood normal.          Hernandez Root PA-C  [address]

## 2023-11-13 NOTE — ASSESSMENT & PLAN NOTE
Continue dietary modifications and recheck before next appointment    Try to lose weight Quality 226: Preventive Care And Screening: Tobacco Use: Screening And Cessation Intervention: Patient screened for tobacco use and is an ex/non-smoker Detail Level: Detailed Quality 111:Pneumonia Vaccination Status For Older Adults: Pneumococcal vaccine (PPSV23) administered on or after patient’s 60th birthday and before the end of the measurement period Quality 130: Documentation Of Current Medications In The Medical Record: Current Medications Documented

## 2023-12-16 ENCOUNTER — HOSPITAL ENCOUNTER (OUTPATIENT)
Dept: RADIOLOGY | Facility: HOSPITAL | Age: 48
Discharge: HOME | End: 2023-12-16
Payer: COMMERCIAL

## 2023-12-16 DIAGNOSIS — Z91.89 INCREASED RISK OF BREAST CANCER: ICD-10-CM

## 2023-12-16 DIAGNOSIS — R92.30 DENSE BREASTS: ICD-10-CM

## 2023-12-16 DIAGNOSIS — Z80.3 FAMILY HISTORY OF BREAST CANCER: ICD-10-CM

## 2023-12-16 DIAGNOSIS — R92.2 DENSE BREASTS: ICD-10-CM

## 2023-12-16 PROCEDURE — A9585 GADOBUTROL INJECTION: HCPCS | Performed by: NURSE PRACTITIONER

## 2023-12-16 PROCEDURE — C8908 MRI W/O FOL W/CONT, BREAST,: HCPCS

## 2023-12-16 RX ORDER — GADOBUTROL 604.72 MG/ML
7 INJECTION INTRAVENOUS
Status: COMPLETED | OUTPATIENT
Start: 2023-12-16 | End: 2023-12-16

## 2023-12-16 RX ADMIN — GADOBUTROL 7 ML: 604.72 INJECTION INTRAVENOUS at 12:45

## 2024-02-12 ENCOUNTER — OFFICE VISIT (OUTPATIENT)
Dept: SURGICAL ONCOLOGY | Facility: CLINIC | Age: 49
End: 2024-02-12
Payer: COMMERCIAL

## 2024-02-12 VITALS
TEMPERATURE: 98 F | SYSTOLIC BLOOD PRESSURE: 110 MMHG | WEIGHT: 163.8 LBS | HEART RATE: 76 BPM | BODY MASS INDEX: 25.71 KG/M2 | DIASTOLIC BLOOD PRESSURE: 68 MMHG | HEIGHT: 67 IN | OXYGEN SATURATION: 98 %

## 2024-02-12 DIAGNOSIS — Z80.3 FAMILY HISTORY OF BREAST CANCER: Primary | ICD-10-CM

## 2024-02-12 DIAGNOSIS — R92.30 DENSE BREASTS: ICD-10-CM

## 2024-02-12 DIAGNOSIS — Z91.89 INCREASED RISK OF BREAST CANCER: ICD-10-CM

## 2024-02-12 DIAGNOSIS — R92.2 DENSE BREASTS: ICD-10-CM

## 2024-02-12 PROCEDURE — 99213 OFFICE O/P EST LOW 20 MIN: CPT | Performed by: NURSE PRACTITIONER

## 2024-02-12 RX ORDER — ERGOCALCIFEROL 1.25 MG/1
50000 CAPSULE ORAL WEEKLY
COMMUNITY
Start: 2024-02-02

## 2024-02-12 NOTE — PROGRESS NOTES
Surgical Oncology Follow Up       [address]  [facility] [place]  [address]  [facility] [id number]    Keily Barkley  1975  [id number]      Chief Complaint   Patient presents with    Follow-up       Assessment/Plan:  1. Family history of breast cancer  - 1 year f/u visit  - MRI breast bilateral w and wo contrast w cad; Future    2. Dense breasts  - MRI breast bilateral w and wo contrast w cad; Future    3. Increased risk of breast cancer  - MRI breast bilateral w and wo contrast w cad; Future      Discussion/Summary: Patient is a 48-year-old female that presents today for follow up visit regarding an increased risk of breast cancer, family history of breast cancer and dense breasts.  She has a significant family history for breast cancer.  I recommended genetic testing but this patient declined.   I have calculated her lifetime TC risk to be 55%.   I recommended annual 3D mammography staggered with annual breast MRI and clinical breast exams every 6 months. She had a bilateral mammogram in June 2023 which was BI-RADS 1, category 3 density.  She had a bilateral breast MRI in December 2023 which was BI-RADS 2.  She offers no new complaints today and there are no concerns on today's exam.  She is already scheduled for a mammogram.  I will make arrangements for her next MRI and I will see her back in 1 year or sooner should the need arise.  She was instructed to contact me with any changes or concerns in the interim.  All of her questions were answered today.  History of Present Illness:     -Interval History: Patient presents today for follow-up visit.  She has not appreciated any changes on self-exam.  She had a recent breast MRI which was benign.  Reports no changes in her family history.    Review of Systems:  Review of Systems   Constitutional:  Negative for chills, fatigue and fever.   Respiratory:  Negative for cough and shortness of breath.    Cardiovascular:   Negative for chest pain.   Hematological:  Negative for adenopathy.   Psychiatric/Behavioral:  Negative for confusion.        Patient Active Problem List   Diagnosis    Lumbar radiculopathy    Vitamin D deficiency    Prediabetes    Thyroid nodule    Increased risk of breast cancer    Family history of breast cancer    Dense breasts    Lumbar herniated disc    BMI 26.0-26.9,adult    Elevated bilirubin    Family history of diabetes mellitus type II     Past Medical History:   Diagnosis Date    Peritonsillar abscess 07/01/2021    Pre-diabetes     Thyroid nodule     Thyroid nodule 07/01/2021     Past Surgical History:   Procedure Laterality Date    TUBAL LIGATION      WISDOM TOOTH EXTRACTION       Family History   Problem Relation Age of Onset    Diabetes Mother     Breast cancer Mother 73    No Known Problems Father     No Known Problems Sister     No Known Problems Sister     No Known Problems Sister     No Known Problems Daughter     No Known Problems Maternal Grandmother     No Known Problems Maternal Grandfather     No Known Problems Paternal Grandmother     No Known Problems Paternal Grandfather     Breast cancer Maternal Aunt 69    Breast cancer Maternal Aunt 65    Breast cancer Maternal Aunt 62    Breast cancer Maternal Aunt 55    No Known Problems Maternal Aunt     No Known Problems Maternal Aunt     Breast cancer Paternal Aunt         unknown age    No Known Problems Paternal Aunt     No Known Problems Paternal Aunt     No Known Problems Paternal Aunt     No Known Problems Paternal Aunt     No Known Problems Paternal Aunt     No Known Problems Paternal Aunt      Social History     Socioeconomic History    Marital status: Registered Domestic Partner     Spouse name: Not on file    Number of children: Not on file    Years of education: Not on file    Highest education level: Not on file   Occupational History    Not on file   Tobacco Use    Smoking status: Never    Smokeless tobacco: Never   Vaping Use    Vaping  status: Never Used   Substance and Sexual Activity    Alcohol use: No    Drug use: No    Sexual activity: Yes     Partners: Male     Birth control/protection: Surgical, None   Other Topics Concern    Not on file   Social History Narrative    Single    2 children    Always uses seat belt    Caffeine use     Social Determinants of Health     Financial Resource Strain: Not on file   Food Insecurity: Not on file   Transportation Needs: Not on file   Physical Activity: Not on file   Stress: Not on file   Social Connections: Not on file   Intimate Partner Violence: Not on file   Housing Stability: Not on file       Current Outpatient Medications:     acetaminophen (TYLENOL) 650 mg CR tablet, Take 1 tablet (650 mg total) by mouth every 8 (eight) hours as needed for mild pain, Disp: 30 tablet, Rfl: 0    Cholecalciferol 50 MCG (2000 UT) TABS, Take 1 tablet (2,000 Units total) by mouth daily, Disp: 90 tablet, Rfl: 3    clotrimazole-betamethasone (LOTRISONE) 1-0.05 % cream, Apply topically 2 (two) times a day, Disp: 30 g, Rfl: 2    ergocalciferol (VITAMIN D2) 50,000 units, Take 50,000 Units by mouth once a week, Disp: , Rfl:     Semaglutide-Weight Management (WEGOVY) 2.4 MG/0.75ML, Inject 2.4 mg under the skin once a week, Disp: , Rfl:   Allergies   Allergen Reactions    Penicillins Swelling     Vitals:    02/12/24 1543   BP: 110/68   Pulse: 76   Temp: 98 °F (36.7 °C)   SpO2: 98%       Physical Exam  Vitals reviewed.   Constitutional:       Appearance: Normal appearance.   HENT:      Head: Normocephalic and atraumatic.   Pulmonary:      Effort: Pulmonary effort is normal.   Chest:   Breasts:     Right: No swelling, bleeding, inverted nipple, mass, nipple discharge, skin change or tenderness.      Left: No swelling, bleeding, inverted nipple, mass, nipple discharge, skin change or tenderness.   Lymphadenopathy:      Upper Body:      Right upper body: No supraclavicular or axillary adenopathy.      Left upper body: No  supraclavicular or axillary adenopathy.   Neurological:      General: No focal deficit present.      Mental Status: She is alert and oriented to person, place, and time.   Psychiatric:         Mood and Affect: Mood normal.         Advance Care Planning/Advance Directives:  Discussed disease status and treatment goals with the patient.

## 2024-02-26 ENCOUNTER — APPOINTMENT (OUTPATIENT)
Dept: LAB | Facility: CLINIC | Age: 49
End: 2024-02-26
Payer: COMMERCIAL

## 2024-02-26 DIAGNOSIS — Z76.89 ENCOUNTER FOR WEIGHT MANAGEMENT: ICD-10-CM

## 2024-02-26 LAB
ALBUMIN SERPL BCP-MCNC: 4.7 G/DL (ref 3.5–5)
ALP SERPL-CCNC: 59 U/L (ref 34–104)
ALT SERPL W P-5'-P-CCNC: 14 U/L (ref 7–52)
ANION GAP SERPL CALCULATED.3IONS-SCNC: 9 MMOL/L
AST SERPL W P-5'-P-CCNC: 18 U/L (ref 13–39)
BILIRUB SERPL-MCNC: 1.63 MG/DL (ref 0.2–1)
BUN SERPL-MCNC: 14 MG/DL (ref 5–25)
CALCIUM SERPL-MCNC: 9.8 MG/DL (ref 8.4–10.2)
CHLORIDE SERPL-SCNC: 104 MMOL/L (ref 96–108)
CO2 SERPL-SCNC: 28 MMOL/L (ref 21–32)
CREAT SERPL-MCNC: 0.66 MG/DL (ref 0.6–1.3)
EST. AVERAGE GLUCOSE BLD GHB EST-MCNC: 105 MG/DL
GFR SERPL CREATININE-BSD FRML MDRD: 106 ML/MIN/1.73SQ M
GLUCOSE P FAST SERPL-MCNC: 88 MG/DL (ref 65–99)
HBA1C MFR BLD: 5.3 %
POTASSIUM SERPL-SCNC: 3.8 MMOL/L (ref 3.5–5.3)
PROT SERPL-MCNC: 7 G/DL (ref 6.4–8.4)
SODIUM SERPL-SCNC: 141 MMOL/L (ref 135–147)

## 2024-02-26 PROCEDURE — 83036 HEMOGLOBIN GLYCOSYLATED A1C: CPT

## 2024-02-26 PROCEDURE — 80053 COMPREHEN METABOLIC PANEL: CPT

## 2024-02-26 PROCEDURE — 36415 COLL VENOUS BLD VENIPUNCTURE: CPT

## 2024-05-28 ENCOUNTER — APPOINTMENT (OUTPATIENT)
Dept: LAB | Facility: CLINIC | Age: 49
End: 2024-05-28
Payer: COMMERCIAL

## 2024-05-28 DIAGNOSIS — R73.03 DIABETES MELLITUS, LATENT: ICD-10-CM

## 2024-05-28 LAB
ALBUMIN SERPL BCP-MCNC: 4.7 G/DL (ref 3.5–5)
ALP SERPL-CCNC: 62 U/L (ref 34–104)
ALT SERPL W P-5'-P-CCNC: 14 U/L (ref 7–52)
ANION GAP SERPL CALCULATED.3IONS-SCNC: 11 MMOL/L (ref 4–13)
AST SERPL W P-5'-P-CCNC: 16 U/L (ref 13–39)
BASOPHILS # BLD AUTO: 0.02 THOUSANDS/ÂΜL (ref 0–0.1)
BASOPHILS NFR BLD AUTO: 0 % (ref 0–1)
BILIRUB SERPL-MCNC: 1.35 MG/DL (ref 0.2–1)
BUN SERPL-MCNC: 15 MG/DL (ref 5–25)
CALCIUM SERPL-MCNC: 9.5 MG/DL (ref 8.4–10.2)
CHLORIDE SERPL-SCNC: 103 MMOL/L (ref 96–108)
CHOLEST SERPL-MCNC: 180 MG/DL
CO2 SERPL-SCNC: 27 MMOL/L (ref 21–32)
CREAT SERPL-MCNC: 0.57 MG/DL (ref 0.6–1.3)
EOSINOPHIL # BLD AUTO: 0.21 THOUSAND/ÂΜL (ref 0–0.61)
EOSINOPHIL NFR BLD AUTO: 3 % (ref 0–6)
ERYTHROCYTE [DISTWIDTH] IN BLOOD BY AUTOMATED COUNT: 14 % (ref 11.6–15.1)
EST. AVERAGE GLUCOSE BLD GHB EST-MCNC: 105 MG/DL
GFR SERPL CREATININE-BSD FRML MDRD: 110 ML/MIN/1.73SQ M
GLUCOSE P FAST SERPL-MCNC: 88 MG/DL (ref 65–99)
HBA1C MFR BLD: 5.3 %
HCT VFR BLD AUTO: 42.8 % (ref 34.8–46.1)
HDLC SERPL-MCNC: 68 MG/DL
HGB BLD-MCNC: 14.2 G/DL (ref 11.5–15.4)
IMM GRANULOCYTES # BLD AUTO: 0.02 THOUSAND/UL (ref 0–0.2)
IMM GRANULOCYTES NFR BLD AUTO: 0 % (ref 0–2)
LDLC SERPL CALC-MCNC: 90 MG/DL (ref 0–100)
LYMPHOCYTES # BLD AUTO: 2.45 THOUSANDS/ÂΜL (ref 0.6–4.47)
LYMPHOCYTES NFR BLD AUTO: 36 % (ref 14–44)
MCH RBC QN AUTO: 28.2 PG (ref 26.8–34.3)
MCHC RBC AUTO-ENTMCNC: 33.2 G/DL (ref 31.4–37.4)
MCV RBC AUTO: 85 FL (ref 82–98)
MONOCYTES # BLD AUTO: 0.35 THOUSAND/ÂΜL (ref 0.17–1.22)
MONOCYTES NFR BLD AUTO: 5 % (ref 4–12)
NEUTROPHILS # BLD AUTO: 3.78 THOUSANDS/ÂΜL (ref 1.85–7.62)
NEUTS SEG NFR BLD AUTO: 56 % (ref 43–75)
NONHDLC SERPL-MCNC: 112 MG/DL
NRBC BLD AUTO-RTO: 0 /100 WBCS
PLATELET # BLD AUTO: 264 THOUSANDS/UL (ref 149–390)
PMV BLD AUTO: 10.2 FL (ref 8.9–12.7)
POTASSIUM SERPL-SCNC: 4 MMOL/L (ref 3.5–5.3)
PROT SERPL-MCNC: 7 G/DL (ref 6.4–8.4)
RBC # BLD AUTO: 5.03 MILLION/UL (ref 3.81–5.12)
SODIUM SERPL-SCNC: 141 MMOL/L (ref 135–147)
TRIGL SERPL-MCNC: 109 MG/DL
TSH SERPL DL<=0.05 MIU/L-ACNC: 0.99 UIU/ML (ref 0.45–4.5)
WBC # BLD AUTO: 6.83 THOUSAND/UL (ref 4.31–10.16)

## 2024-05-28 PROCEDURE — 83036 HEMOGLOBIN GLYCOSYLATED A1C: CPT

## 2024-05-28 PROCEDURE — 85025 COMPLETE CBC W/AUTO DIFF WBC: CPT

## 2024-05-28 PROCEDURE — 36415 COLL VENOUS BLD VENIPUNCTURE: CPT

## 2024-05-28 PROCEDURE — 84443 ASSAY THYROID STIM HORMONE: CPT

## 2024-05-28 PROCEDURE — 80061 LIPID PANEL: CPT

## 2024-05-28 PROCEDURE — 80053 COMPREHEN METABOLIC PANEL: CPT

## 2024-06-21 ENCOUNTER — HOSPITAL ENCOUNTER (OUTPATIENT)
Dept: RADIOLOGY | Age: 49
Discharge: HOME | End: 2024-06-21
Payer: COMMERCIAL

## 2024-06-21 VITALS — BODY MASS INDEX: 25.71 KG/M2 | HEIGHT: 67 IN | WEIGHT: 163.8 LBS

## 2024-06-21 DIAGNOSIS — Z12.31 VISIT FOR SCREENING MAMMOGRAM: ICD-10-CM

## 2024-06-21 PROCEDURE — 77067 SCR MAMMO BI INCL CAD: CPT

## 2024-06-21 PROCEDURE — 77063 BREAST TOMOSYNTHESIS BI: CPT

## 2024-07-02 ENCOUNTER — ANNUAL EXAM (OUTPATIENT)
Dept: GYNECOLOGY | Facility: CLINIC | Age: 49
End: 2024-07-02
Payer: COMMERCIAL

## 2024-07-02 VITALS
DIASTOLIC BLOOD PRESSURE: 78 MMHG | SYSTOLIC BLOOD PRESSURE: 102 MMHG | HEIGHT: 67 IN | BODY MASS INDEX: 26.21 KG/M2 | WEIGHT: 167 LBS

## 2024-07-02 DIAGNOSIS — M54.16 LUMBAR RADICULOPATHY: ICD-10-CM

## 2024-07-02 DIAGNOSIS — Z98.51 STATUS POST TUBAL LIGATION: ICD-10-CM

## 2024-07-02 DIAGNOSIS — Z01.419 WOMEN'S ANNUAL ROUTINE GYNECOLOGICAL EXAMINATION: Primary | ICD-10-CM

## 2024-07-02 DIAGNOSIS — R92.30 DENSE BREASTS: ICD-10-CM

## 2024-07-02 DIAGNOSIS — N95.1 MENOPAUSAL SYNDROME (HOT FLASHES): ICD-10-CM

## 2024-07-02 DIAGNOSIS — Z80.3 FAMILY HISTORY OF BREAST CANCER: ICD-10-CM

## 2024-07-02 DIAGNOSIS — N91.1 AMENORRHEA, SECONDARY: ICD-10-CM

## 2024-07-02 DIAGNOSIS — Z12.11 COLON CANCER SCREENING: ICD-10-CM

## 2024-07-02 DIAGNOSIS — E55.9 VITAMIN D DEFICIENCY: ICD-10-CM

## 2024-07-02 PROCEDURE — 99396 PREV VISIT EST AGE 40-64: CPT | Performed by: OBSTETRICS & GYNECOLOGY

## 2024-07-02 NOTE — PROGRESS NOTES
"Assessment   Annual well woman exam  Postmenopausal state  Menopausal syndrome, hot flashes, night sweats, declines treatment  Status post tubal ligation  Colon cancer screening up-to-date  Negative Cologuard testing   Obesity/BMI 26  On Wegovy        Plan   Screening mammogram ordered   All questions answered.  Breast self exam technique reviewed and patient encouraged to perform self-exam monthly.  Discussed healthy lifestyle modifications.     Subjective here for annual exam     Keily Barkley is a 49 y.o. female who presents for annual exam.  Patient does not have menstrual cycle in the past 2 years.  She does have some occasional hot flashes and night sweats.  Declines intervention.  Denies bleeding or pelvic pain symptoms or unusual discharge.The patient reports that there is not domestic violence in her life.     Current contraception: tubal ligation  History of abnormal Pap smear: no  Family history of uterine or ovarian cancer: no  Regular self breast exam: yes  History of abnormal mammogram: no  Family history of breast cancer: yes -mother and maternal aunts  History of abnormal lipids: no  Menstrual History:  OB History          2    Para   2    Term   2            AB        Living             SAB        IAB        Ectopic        Multiple        Live Births   2                Menarche age: 14  Patient's last menstrual period was 2023 (approximate).   Review of Systems  Pertinent items are noted in HPI.      Objective no acute distress   /78 (BP Location: Left arm, Patient Position: Sitting, Cuff Size: Standard)   Ht 5' 7\" (1.702 m)   Wt 75.8 kg (167 lb)   LMP 2023 (Approximate)   BMI 26.16 kg/m²     General:   alert and oriented, in no acute distress, alert, appears stated age, and cooperative   Heart: regular rate and rhythm, S1, S2 normal, no murmur, click, rub or gallop   Lungs: clear to auscultation bilaterally   Abdomen: soft, non-tender, without masses or " organomegaly   Vulva: normal, Bartholin's, Urethra, [place]'s normal, female escutcheon   Vagina: normal mucosa, normal discharge, no palpable nodules   Cervix: anteverted, multiparous appearance, no cervical motion tenderness, and no lesions   Uterus: Normal size movable no masses noted   Adnexa: normal adnexa and no mass, fullness, tenderness   Bilateral breast exam in the sitting and supine position with chaperone present, no visible or palpable breast lesions identified.  No breast masses noted.    No supraclavicular or axillary lymphadenopathy noted.  No nipple discharge.   Reviewed self-breast exam techniques.   Rectal exam,  deferred

## 2024-08-20 ENCOUNTER — OFFICE VISIT (OUTPATIENT)
Dept: FAMILY MEDICINE CLINIC | Facility: CLINIC | Age: 49
End: 2024-08-20
Payer: COMMERCIAL

## 2024-08-20 VITALS
DIASTOLIC BLOOD PRESSURE: 60 MMHG | RESPIRATION RATE: 16 BRPM | BODY MASS INDEX: 26.27 KG/M2 | WEIGHT: 167.4 LBS | HEIGHT: 67 IN | OXYGEN SATURATION: 99 % | SYSTOLIC BLOOD PRESSURE: 110 MMHG | HEART RATE: 73 BPM | TEMPERATURE: 98.1 F

## 2024-08-20 DIAGNOSIS — E04.1 THYROID NODULE: Primary | ICD-10-CM

## 2024-08-20 DIAGNOSIS — E55.9 VITAMIN D DEFICIENCY: ICD-10-CM

## 2024-08-20 DIAGNOSIS — R22.1 LOCALIZED SWELLING, MASS AND LUMP, NECK: ICD-10-CM

## 2024-08-20 DIAGNOSIS — L30.9 DERMATITIS: ICD-10-CM

## 2024-08-20 PROBLEM — L65.9 HAIR LOSS: Status: ACTIVE | Noted: 2024-02-29

## 2024-08-20 PROBLEM — E80.4 GILBERT'S SYNDROME: Status: ACTIVE | Noted: 2023-04-27

## 2024-08-20 PROCEDURE — 99214 OFFICE O/P EST MOD 30 MIN: CPT | Performed by: PHYSICIAN ASSISTANT

## 2024-08-20 RX ORDER — ERGOCALCIFEROL 1.25 MG/1
50000 CAPSULE, LIQUID FILLED ORAL
Qty: 12 CAPSULE | Refills: 1 | Status: SHIPPED | OUTPATIENT
Start: 2024-08-20

## 2024-08-20 NOTE — ASSESSMENT & PLAN NOTE
Previous 2.5 cm nodule in left mid gland per ultrasound 7/2023.  Repeat ultrasound due to lump present in area of previous thyroid nodule.

## 2024-08-20 NOTE — ASSESSMENT & PLAN NOTE
Nonspecific, present on dorsal aspect of right wrist, improving with topical clotrimazole/betamethasone.  Continue same x 2 weeks and follow-up if no improvement.

## 2024-08-20 NOTE — PROGRESS NOTES
Ambulatory Visit  Name: Keily Barkley      : 1975      MRN: [id number]  Encounter Provider: Lakeshia Blancas PA-C  Encounter Date: 2024   Encounter department: [facility]    Assessment & Plan   1. Thyroid nodule  Assessment & Plan:  Previous 2.5 cm nodule in left mid gland per ultrasound 2023.  Repeat ultrasound due to lump present in area of previous thyroid nodule.  Orders:  -     US head neck soft tissue; Future; Expected date: 2024  2. Localized swelling, mass and lump, neck  Comments:  x 1 month, nontender, mobile, area of previous known thyroid nodule, check ultrasound neck.  Orders:  -     US head neck soft tissue; Future; Expected date: 2024  3. BMI 26.0-26.9,adult  Assessment & Plan:  Continue Wegovy 2.4 mg once weekly per endocrinology.  4. Vitamin D deficiency  Assessment & Plan:  Improved with high-dose vitamin D supplement, continue every other week.  5. Dermatitis  Assessment & Plan:  Nonspecific, present on dorsal aspect of right wrist, improving with topical clotrimazole/betamethasone.  Continue same x 2 weeks and follow-up if no improvement.       History of Present Illness     Keily is a 49 y.o. female with a h/o thyroid nodule who presents with lump on left side of neck x 1 month.  Increase in size.  No dental issues, ear issues, upper respiratory symptoms.  No allergies.  Right wrist rash has been for the past week, improving with topical cream.  No pain or difficulty swallowing with left side of neck lump.  Concerned due to size and history of thyroid nodule in the past.    Rash  Pertinent negatives include no fever or shortness of breath.       Review of Systems   Constitutional:  Negative for chills, fever and unexpected weight change.   HENT:  Negative for trouble swallowing and voice change.    Respiratory:  Negative for shortness of breath.    Cardiovascular:  Negative for chest pain.   Skin:  Positive for rash (Right hand).  "      Objective     /60 (BP Location: Left arm, Patient Position: Sitting, Cuff Size: Standard)   Pulse 73   Temp 98.1 °F (36.7 °C) (Temporal)   Resp 16   Ht 5' 7\" (1.702 m)   Wt 75.9 kg (167 lb 6.4 oz)   LMP 02/18/2023 (Approximate)   SpO2 99%   BMI 26.22 kg/m²     Physical Exam  Vitals reviewed.   Constitutional:       Appearance: Normal appearance.   HENT:      Head: Normocephalic and atraumatic.      Right Ear: Tympanic membrane, ear canal and external ear normal.      Left Ear: Tympanic membrane, ear canal and external ear normal.   Neck:      Vascular: No carotid bruit.     Cardiovascular:      Rate and Rhythm: Normal rate and regular rhythm.   Pulmonary:      Effort: Pulmonary effort is normal.      Breath sounds: Normal breath sounds.   Musculoskeletal:      Cervical back: No rigidity or tenderness.   Lymphadenopathy:      Cervical: No cervical adenopathy.   Skin:     Findings: Rash present. Rash is papular. Rash is not crusting, macular, nodular, purpuric, pustular, scaling, urticarial or vesicular.          Neurological:      Mental Status: She is alert and oriented to person, place, and time. Mental status is at baseline.       Administrative Statements           "

## 2024-08-24 ENCOUNTER — HOSPITAL ENCOUNTER (OUTPATIENT)
Dept: ULTRASOUND IMAGING | Facility: HOSPITAL | Age: 49
Discharge: HOME | End: 2024-08-24
Payer: COMMERCIAL

## 2024-08-24 DIAGNOSIS — R22.1 LOCALIZED SWELLING, MASS AND LUMP, NECK: ICD-10-CM

## 2024-08-24 DIAGNOSIS — E04.1 THYROID NODULE: ICD-10-CM

## 2024-08-24 PROCEDURE — 76536 US EXAM OF HEAD AND NECK: CPT

## 2024-08-28 ENCOUNTER — TELEPHONE (OUTPATIENT)
Age: 49
End: 2024-08-28

## 2024-08-28 DIAGNOSIS — R59.1 LYMPHADENOPATHY: Primary | ICD-10-CM

## 2024-08-28 NOTE — TELEPHONE ENCOUNTER
I called patient, discussed possible lymph node enlargement, provided reassurance for now, if not improved in 3 weeks, will check repeat CBC.  Not related to thyroid nodule as it has not changed.  Follow-up as needed.  She expressed understanding and thanks.
Patient aware of US results. She states she still feels a lump on her left side.   
verbal instruction/written material

## 2024-11-12 ENCOUNTER — OFFICE VISIT (OUTPATIENT)
Dept: FAMILY MEDICINE CLINIC | Facility: CLINIC | Age: 49
End: 2024-11-12
Payer: COMMERCIAL

## 2024-11-12 VITALS
RESPIRATION RATE: 16 BRPM | OXYGEN SATURATION: 100 % | SYSTOLIC BLOOD PRESSURE: 104 MMHG | HEIGHT: 67 IN | HEART RATE: 75 BPM | BODY MASS INDEX: 25.74 KG/M2 | WEIGHT: 164 LBS | DIASTOLIC BLOOD PRESSURE: 66 MMHG | TEMPERATURE: 97.5 F

## 2024-11-12 DIAGNOSIS — R73.03 PREDIABETES: ICD-10-CM

## 2024-11-12 DIAGNOSIS — E04.1 THYROID NODULE: ICD-10-CM

## 2024-11-12 DIAGNOSIS — E55.9 VITAMIN D DEFICIENCY: ICD-10-CM

## 2024-11-12 DIAGNOSIS — Z00.00 ANNUAL PHYSICAL EXAM: Primary | ICD-10-CM

## 2024-11-12 PROCEDURE — 99396 PREV VISIT EST AGE 40-64: CPT | Performed by: PHYSICIAN ASSISTANT

## 2024-11-12 NOTE — PROGRESS NOTES
Adult Annual Physical  Name: Keily Barkley      : 1975      MRN: [id number]  Encounter Provider: Lakeshia Blancas PA-C  Encounter Date: 2024   Encounter department: [facility]    Assessment & Plan  Annual physical exam         Thyroid nodule  Most recent thyroid ultrasound from 2024 with following:    IMPRESSION:     Stable exam. No nodule meets current ACR criteria for requiring biopsy or follow-up ultrasounds.         Prediabetes  Lab Results   Component Value Date    HGBA1C 5.3 2024     No longer prediabetes, resolved with weight loss and Wegovy.         BMI 26.0-26.9,adult  Continue on Wegovy 2.4 mg once weekly per endocrinology.  Currently at goal weight 160s.  Follow-up endocrinology in January to determine discontinuation of medication.         Vitamin D deficiency            Component  Ref Range & Units (hover) 24 11:53 AM 10/31/23 12:21 PM 23  9:53 AM 23  8:56 AM 20  9:26 AM 19  9:03 AM 18  8:32 AM   Vit D, 25-Hydroxy 44.9 65.8 CM 38.2 CM 12.4 Low  22.1 Low  31.2 13.8 Low         Continue decreased dose every other week vitamin D2 50,000 IU.          Immunizations and preventive care screenings were discussed with patient today. Appropriate education was printed on patient's after visit summary.    Counseling:  Alcohol/drug use: discussed moderation in alcohol intake, the recommendations for healthy alcohol use, and avoidance of illicit drug use.  Dental Health: discussed importance of regular tooth brushing, flossing, and dental visits.  Injury prevention: discussed safety/seat belts, safety helmets, smoke detectors, carbon monoxide detectors, and smoking near bedding or upholstery.  Sexual health: discussed sexually transmitted diseases, partner selection, use of condoms, avoidance of unintended pregnancy, and contraceptive alternatives.  Exercise: the importance of regular exercise/physical activity was discussed.  "Recommend exercise 3-5 times per week for at least 30 minutes.          History of Present Illness     Adult Annual Physical:  Patient presents for annual physical. Keily is a 49 y.o. female who presents for routine annual physical without complaints.  Following with endocrinology for weight management.  Normal Cologuard in 7/2023, following with surgeon for increased risk of breast cancer and gynecology.  Previous lump in throat resolved, no longer palpable.    Daughter present for exam.  .     Diet and Physical Activity:  - Diet/Nutrition: well balanced diet.  - Exercise: walking.    Depression Screening:  - PHQ-2 Score: 0    General Health:  - Sleep: sleeps well.  - Hearing: normal hearing bilateral ears.  - Vision: goes for regular eye exams.  - Dental: regular dental visits.    /GYN Health:  - Follows with GYN: yes.   - Menopause: postmenopausal.   - Last menstrual cycle: 2/1/2023.   - History of STDs: no    Advanced Care Planning:  - Has an advanced directive?: no    - Has a durable medical POA?: no    - ACP document given to patient?: no      Review of Systems   Constitutional:  Negative for chills and fever.   HENT:  Negative for ear pain and sore throat.    Eyes:  Negative for pain and visual disturbance.   Respiratory:  Negative for cough and shortness of breath.    Cardiovascular:  Negative for chest pain and palpitations.   Gastrointestinal:  Negative for abdominal pain and vomiting.   Genitourinary:  Negative for dysuria and hematuria.   Musculoskeletal:  Negative for arthralgias and back pain.   Skin:  Negative for color change and rash.   Neurological:  Negative for seizures and syncope.   All other systems reviewed and are negative.        Objective     /66 (BP Location: Left arm, Patient Position: Sitting, Cuff Size: Standard)   Pulse 75   Temp 97.5 °F (36.4 °C) (Temporal)   Resp 16   Ht 5' 6.5\" (1.689 m)   Wt 74.4 kg (164 lb)   LMP 02/18/2023 (Approximate)   SpO2 100%   BMI 26.07 " kg/m²     Physical Exam  Vitals and nursing note reviewed.   Constitutional:       General: She is not in acute distress.     Appearance: She is well-developed.   HENT:      Head: Normocephalic and atraumatic.      Right Ear: Tympanic membrane, ear canal and external ear normal.      Left Ear: Tympanic membrane, ear canal and external ear normal.   Eyes:      Conjunctiva/sclera: Conjunctivae normal.   Cardiovascular:      Rate and Rhythm: Normal rate and regular rhythm.      Heart sounds: No murmur heard.  Pulmonary:      Effort: Pulmonary effort is normal. No respiratory distress.      Breath sounds: Normal breath sounds.   Abdominal:      Palpations: Abdomen is soft.      Tenderness: There is no abdominal tenderness.   Musculoskeletal:         General: No swelling.      Cervical back: Neck supple.   Skin:     General: Skin is warm and dry.      Capillary Refill: Capillary refill takes less than 2 seconds.   Neurological:      Mental Status: She is alert.   Psychiatric:         Mood and Affect: Mood normal.

## 2024-11-13 PROBLEM — M54.16 LUMBAR RADICULOPATHY: Status: RESOLVED | Noted: 2017-11-01 | Resolved: 2024-11-13

## 2024-11-13 PROBLEM — R73.03 PREDIABETES: Status: RESOLVED | Noted: 2018-07-05 | Resolved: 2024-11-13

## 2024-11-13 NOTE — ASSESSMENT & PLAN NOTE
Most recent thyroid ultrasound from 8/2024 with following:    IMPRESSION:     Stable exam. No nodule meets current ACR criteria for requiring biopsy or follow-up ultrasounds.          PT DAILY TREATMENT NOTE 10-18    Patient Name: Holley Veliz  Date:2020  : 1968  [x]  Patient  Verified  Payor: Brittney Parra / Plan: [employer] / Product Type: HMO /    In time:5:31   Out time: 6:18  Total Treatment Time (min): 52  Visit #: 2 of 4-8    Treatment Area: Low back pain [M54.5]  Radiculopathy, lumbar region [M54.16]    SUBJECTIVE  Pain Level (0-10 scale): 3-4  Any medication changes, allergies to medications, adverse drug reactions, diagnosis change, or new procedure performed?: [x] No    [] Yes (see summary sheet for update)  Subjective functional status/changes:   [] No changes reported  Pt reports non-compliance with HEP. Pt reports she was dancing at a wedding and was feeling some pain. Pt states she has been walking with a limp all day because of the pain. OBJECTIVE    19 min Neuromuscular Re-education:  [x]  See flow sheet : core stability and barrel exercises   Rationale: increase strength, improve coordination and increase proprioception  to improve the patients ability to tolerate ambulation. 8 min Therapeutic Activity:  []  See flow sheet : pt education on continuing to hold dance classes until her pain is improved, pt education on using a heating pad/MHP at home for 15-20 mins for pain relief, pt education on activity modification and postural awareness to avoid increased pain   Rationale: increase strength and improve pain  to improve the patients ability to tolerate functional tasks. 20 min Manual Therapy: In supine: right LE pull to correct right innominate upslip, MET to correct right posterior innominate, shotgun technique after performing above; pt education and demonstration on how to perform a self MET to correct a right posterior innominate in hooklying with a stick/broom and pt reported verbal understanding. Rationale: decrease pain, increase ROM, increase tissue extensibility and increase postural awareness to improve standing tolerance. With   [] TE   [x] TA   [x] neuro   [x] other: manual therapy Patient Education: [x] Review HEP    [x] Progressed/Changed HEP based on:   [x] positioning   [x] body mechanics   [] transfers   [x] heat/ice application    [x] other: see above and below. Other Objective/Functional Measures: initiated exercises/interventions in flow sheet. Improvement in pelvic alignment and pain noted post manual interventions. Pain Level (0-10 scale) post treatment: 0    ASSESSMENT/Changes in Function: Reported significant improvement in pain post manual interventions today. Held some exercises today secondary to this significant improvement in pain and therapist giving significant pt education as described above. Educated pt on importance of performing HEP exercises and educated pt to also add PPT to the HEP to improve pain and tightness. We will plan on continuing therapy to improve pain and mobility. Continue POC as tolerated. Patient will continue to benefit from skilled PT services to modify and progress therapeutic interventions, address functional mobility deficits, address ROM deficits, address strength deficits, analyze and address soft tissue restrictions, analyze and cue movement patterns, analyze and modify body mechanics/ergonomics, assess and modify postural abnormalities and instruct in home and community integration to attain remaining goals. []  See Plan of Care  []  See progress note/recertification  []  See Discharge Summary         Progress towards goals / Updated goals:  Short Term Goals: To be accomplished in 2 weeks:               1. Patient will report performance of HEP at least 2 times per day to facilitate improved outcomes and improved self management. Current: not met, reports non compliance 2/27/2020     Long Term Goals: To be accomplished in 4 weeks:               1. Patient will report FOTO score of 66 or better to indicate significant improvement in functional status. 2. Pt will demonstrate trunk flexion AROM fingers to floor void of pain provocation to improve ease of daily activity. 3. Pt will demonstrate (-) right unilateral SLR to indicate reduced dural irritation an improve QOL. 4. Pt will demonstrate ability to perform squat with 20# KB x 10 void of pain to improve ease of daily tasks.     PLAN  [x]  Upgrade activities as tolerated     [x]  Continue plan of care  [x]  Update interventions per flow sheet       []  Discharge due to:_  []  Other:_      Godwin Billing, PT 2/27/2020  5:53 PM    Future Appointments   Date Time Provider Aureliano Shoemaker   3/11/2020  3:30 PM Sae Dupree, [id number] [facility]   3/16/2020  5:30 PM [facility] MRI RM 1 [facility]   3/20/2020  4:30 PM [facility] SCOTT RM 3 3D [facility]   3/31/2020  3:30 PM Mayo Escoto  [address]   6/12/2020  2:45 PM Babs Gonzalez MD [facility]

## 2024-11-13 NOTE — ASSESSMENT & PLAN NOTE
Component  Ref Range & Units (hover) 5/28/24 11:53 AM 10/31/23 12:21 PM 8/2/23  9:53 AM 2/17/23  8:56 AM 12/4/20  9:26 AM 2/5/19  9:03 AM 6/26/18  8:32 AM   Vit D, 25-Hydroxy 44.9 65.8 CM 38.2 CM 12.4 Low  22.1 Low  31.2 13.8 Low         Continue decreased dose every other week vitamin D2 50,000 IU.

## 2024-11-13 NOTE — ASSESSMENT & PLAN NOTE
Lab Results   Component Value Date    HGBA1C 5.3 05/28/2024     No longer prediabetes, resolved with weight loss and Wegovy.

## 2024-11-13 NOTE — ASSESSMENT & PLAN NOTE
Continue on Wegovy 2.4 mg once weekly per endocrinology.  Currently at goal weight 160s.  Follow-up endocrinology in January to determine discontinuation of medication.

## 2024-11-29 ENCOUNTER — OFFICE VISIT (OUTPATIENT)
Dept: GYNECOLOGY | Facility: CLINIC | Age: 49
End: 2024-11-29
Payer: COMMERCIAL

## 2024-11-29 VITALS — BODY MASS INDEX: 25.63 KG/M2 | SYSTOLIC BLOOD PRESSURE: 110 MMHG | DIASTOLIC BLOOD PRESSURE: 70 MMHG | WEIGHT: 161.2 LBS

## 2024-11-29 DIAGNOSIS — R10.32 LEFT LOWER QUADRANT PAIN: Primary | ICD-10-CM

## 2024-11-29 PROCEDURE — 99213 OFFICE O/P EST LOW 20 MIN: CPT | Performed by: OBSTETRICS & GYNECOLOGY

## 2024-11-29 NOTE — PROGRESS NOTES
Name: Keily Barkley      : 1975      MRN: [id number]  Encounter Provider: Juana Viramontes DO  Encounter Date: 2024   Encounter department: [facility]  :  Assessment & Plan  Left lower quadrant pain           Llq pain - exam is normal, we will order an ultrasound.  If the ultrasound is normal, I would recommend that she follow-up with her family doctor.  We discussed other causes of pelvic pain that are not GYN such as GI issues or musculoskeletal issues.  She will schedule the ultrasound and follow-up with her family doctor if the pain persists and ultrasound is normal.    History of Present Illness     HPI  Keily Barkley is a 49 y.o. female who presents for evaluation of left-sided pain.  This started 2 weeks ago.  It does not occur every day but when she has it, it is constant and sometimes last for several days.  She has not had any pain for the past 2 days.  It is a stabbing pain although not severe.  She has taken Advil with relief.  She feels it is worse with activity.  She does not recall any unusual activity at the time this started.  She has had no vaginal bleeding.  She did notice some clear to white discharge when this started but it has resolved.    Normal bowel and bladder function and normal appetite.  She does feel that she is urinating slightly more often but denies any symptoms of UTI, no fever.    Status post tubal.  She is menopausal and has not had a menstrual cycle for almost 2 years.  Negative Cologuard in 2023            Review of Systems   Constitutional: Negative.    Gastrointestinal: Negative.    Genitourinary:  Positive for frequency and pelvic pain.          Objective   /70   Wt 73.1 kg (161 lb 3.2 oz)   LMP 2023 (Approximate)   BMI 25.63 kg/m²      Physical Exam  Abdominal:      General: Abdomen is flat. There is no distension.      Palpations: Abdomen is soft. There is no mass.      Tenderness: There is no abdominal tenderness.  There is no right CVA tenderness, left CVA tenderness, guarding or rebound.   Genitourinary:     General: Normal vulva.      Vagina: Normal.      Cervix: Normal.      Uterus: Normal.       Adnexa: Right adnexa normal and left adnexa normal.      Comments: No mass palpated, slight tenderness with deep palpation on left  No discharge

## 2024-12-21 ENCOUNTER — HOSPITAL ENCOUNTER (OUTPATIENT)
Dept: RADIOLOGY | Facility: HOSPITAL | Age: 49
Discharge: HOME | End: 2024-12-21
Payer: COMMERCIAL

## 2024-12-21 DIAGNOSIS — Z80.3 FAMILY HISTORY OF BREAST CANCER: ICD-10-CM

## 2024-12-21 DIAGNOSIS — R92.30 DENSE BREASTS: ICD-10-CM

## 2024-12-21 DIAGNOSIS — Z91.89 INCREASED RISK OF BREAST CANCER: ICD-10-CM

## 2024-12-21 PROCEDURE — A9585 GADOBUTROL INJECTION: HCPCS | Performed by: NURSE PRACTITIONER

## 2024-12-21 PROCEDURE — C8908 MRI W/O FOL W/CONT, BREAST,: HCPCS

## 2024-12-21 PROCEDURE — G1004 CDSM NDSC: HCPCS

## 2024-12-21 RX ORDER — GADOBUTROL 604.72 MG/ML
7 INJECTION INTRAVENOUS
Status: COMPLETED | OUTPATIENT
Start: 2024-12-21 | End: 2024-12-21

## 2024-12-21 RX ADMIN — GADOBUTROL 7 ML: 604.72 INJECTION INTRAVENOUS at 12:52

## 2024-12-23 ENCOUNTER — RESULTS FOLLOW-UP (OUTPATIENT)
Dept: SURGICAL ONCOLOGY | Facility: CLINIC | Age: 49
End: 2024-12-23

## 2025-01-10 ENCOUNTER — APPOINTMENT (OUTPATIENT)
Dept: LAB | Facility: HOSPITAL | Age: 50
End: 2025-01-10
Payer: COMMERCIAL

## 2025-01-10 DIAGNOSIS — R73.03 DIABETES MELLITUS, LATENT: ICD-10-CM

## 2025-01-10 DIAGNOSIS — Z76.89 REFERRAL OF PATIENT WITHOUT EXAMINATION OR TREATMENT: Primary | ICD-10-CM

## 2025-01-10 DIAGNOSIS — E04.1 NONTOXIC UNINODULAR GOITER: ICD-10-CM

## 2025-01-10 LAB
ALBUMIN SERPL BCG-MCNC: 4.4 G/DL (ref 3.5–5)
ALP SERPL-CCNC: 70 U/L (ref 34–104)
ALT SERPL W P-5'-P-CCNC: 13 U/L (ref 7–52)
ANION GAP SERPL CALCULATED.3IONS-SCNC: 7 MMOL/L (ref 4–13)
AST SERPL W P-5'-P-CCNC: 15 U/L (ref 13–39)
BILIRUB SERPL-MCNC: 1.06 MG/DL (ref 0.2–1)
BUN SERPL-MCNC: 11 MG/DL (ref 5–25)
CALCIUM SERPL-MCNC: 9.2 MG/DL (ref 8.4–10.2)
CHLORIDE SERPL-SCNC: 105 MMOL/L (ref 96–108)
CHOLEST SERPL-MCNC: 175 MG/DL (ref ?–200)
CO2 SERPL-SCNC: 27 MMOL/L (ref 21–32)
CREAT SERPL-MCNC: 0.61 MG/DL (ref 0.6–1.3)
EST. AVERAGE GLUCOSE BLD GHB EST-MCNC: 100 MG/DL
GFR SERPL CREATININE-BSD FRML MDRD: 108 ML/MIN/1.73SQ M
GLUCOSE P FAST SERPL-MCNC: 92 MG/DL (ref 65–99)
HBA1C MFR BLD: 5.1 %
HDLC SERPL-MCNC: 67 MG/DL
LDLC SERPL CALC-MCNC: 92 MG/DL (ref 0–100)
NONHDLC SERPL-MCNC: 108 MG/DL
POTASSIUM SERPL-SCNC: 3.8 MMOL/L (ref 3.5–5.3)
PROT SERPL-MCNC: 7.2 G/DL (ref 6.4–8.4)
SODIUM SERPL-SCNC: 139 MMOL/L (ref 135–147)
TRIGL SERPL-MCNC: 79 MG/DL (ref ?–150)
TSH SERPL DL<=0.05 MIU/L-ACNC: 2.19 UIU/ML (ref 0.45–4.5)

## 2025-01-10 PROCEDURE — 80053 COMPREHEN METABOLIC PANEL: CPT

## 2025-01-10 PROCEDURE — 80061 LIPID PANEL: CPT

## 2025-01-10 PROCEDURE — 83036 HEMOGLOBIN GLYCOSYLATED A1C: CPT

## 2025-01-10 PROCEDURE — 36415 COLL VENOUS BLD VENIPUNCTURE: CPT

## 2025-01-10 PROCEDURE — 84443 ASSAY THYROID STIM HORMONE: CPT

## 2025-01-16 ENCOUNTER — TELEPHONE (OUTPATIENT)
Dept: SURGICAL ONCOLOGY | Facility: CLINIC | Age: 50
End: 2025-01-16

## 2025-01-16 NOTE — TELEPHONE ENCOUNTER
Left msg for pt regarding need to cancel appt on Fri 2/14/25 @ 3:30 PM due to change in provider schedule.  Provided [phone] to reschedule.  Also advised pt that we have a new nurse practitioner who works out of [facility] that she can reschedule with.

## 2025-02-04 ENCOUNTER — OFFICE VISIT (OUTPATIENT)
Dept: SURGICAL ONCOLOGY | Facility: CLINIC | Age: 50
End: 2025-02-04
Payer: COMMERCIAL

## 2025-02-04 VITALS
SYSTOLIC BLOOD PRESSURE: 108 MMHG | BODY MASS INDEX: 25.63 KG/M2 | OXYGEN SATURATION: 99 % | HEIGHT: 67 IN | DIASTOLIC BLOOD PRESSURE: 72 MMHG | TEMPERATURE: 97.2 F | HEART RATE: 78 BPM

## 2025-02-04 DIAGNOSIS — Z12.39 BREAST CANCER SCREENING OTHER THAN MAMMOGRAM: ICD-10-CM

## 2025-02-04 DIAGNOSIS — Z80.3 FAMILY HISTORY OF BREAST CANCER: ICD-10-CM

## 2025-02-04 DIAGNOSIS — Z12.31 BREAST CANCER SCREENING BY MAMMOGRAM: ICD-10-CM

## 2025-02-04 DIAGNOSIS — Z91.89 AT HIGH RISK FOR BREAST CANCER: Primary | ICD-10-CM

## 2025-02-04 PROCEDURE — 99213 OFFICE O/P EST LOW 20 MIN: CPT

## 2025-02-04 NOTE — PROGRESS NOTES
Name: Keily Barkley      : 1975      MRN: [id number]  Encounter Provider: MANJU Diehl  Encounter Date: 2025   Encounter department: [facility]  :  Assessment & Plan  At high risk for breast cancer  Ms. Keily Barkley is a 49 y.o. female presenting today for 1 year follow up due to her increased risk of breast cancer. Pt does not have a personal hx of cancer, however her family hx is notable for breast cancer in her mother (dx age 73), 4 maternal aunts (dx age 69, 65, 55), a maternal grandmother (dx in 90s), as well as a paternal aunt (dx at 50). No known familial genetic testing. Pt will inquire with living aunt if genetic testing has been completed. Pt has not previously completed genetic testing herself. I discussed the option for genetic testing at length with Ms. Barkley, and encouraged her to consider this option given her maternal family history of breast cancer. She defers referral at this time, however will continue to consider this option and inform me if she would like to have consult with cancer genetics. She continues enhanced breast cancer screening with annual mammogram and breast MRI. Her last bilateral mammogram was on  2024,  which demonstrated BI-RADS 1, category 3 density (Heterogeneously dense). Additionally, bilateral breast MRI was completed 2024, also with benign findings. Today I also discussed breast cancer risk at length with Ms. Keily Barkley. Including the fact that breast cancer screening is largely dependent on lifetime risk of breast cancer. Tyrer-Cuzick (TC) risk score was calculated today, with results demonstrating a 24.8% lifetime risk of breast cancer (utilizing competing mortality). This score cannot be entirely accurate given the size of Ms. Barkley's family, however I feel this is a most accurate calculation to date. I advised Ms. Keily Barkley that she is considered high risk. With this,  enhanced breast cancer screening is recommended per NCCN guidelines. This includes a clinical breast exam (CBE) every 6-12 months, self-breast awareness, as well as annual mammogram and annual breast MRI with and w/o contrast, ideally  by 6 months. In addition, pt may consider risk reduction in the form of lifestyle modifications and/or chemoprophylaxis (Tamoxifen). We will elect to defer chemoprevention until pt comes to a decision regarding genetics / can obtain further family history. We briefly reviewed recommendations to reduce breast cancer risk via healthy lifestyle, including avoiding or limiting alcohol use, obtaining 150-300 minutes of weekly exercise, as well as weight control (BMI <25). There were no concerning findings upon clinical breast exam (CBE) today. No dominant masses, nodularities, skin changes, enlarged lymph nodes or nipple discharge present. Dense breast tissue appreciated throughout. S/s of breast cancer were reviewed. Script provided for 2025 breast MRI. Mammogram is already scheduled, I have requested to be Cc'd on results. I encouraged pt to promptly call if any questions or concerns prior to next scheduled appmt. I will see the patient back in 5 months, or or sooner should the need arise. All questions were answered today.  Orders:  •  MRI breast bilateral w and wo contrast w cad; Future    Family history of breast cancer  Orders:  •  MRI breast bilateral w and wo contrast w cad; Future    Breast cancer screening other than mammogram  Orders:  •  MRI breast bilateral w and wo contrast w cad; Future      History of Present Illness   Keily Barkley is a 49 y.o. year old female who presents for 1 year follow up due to her increased risk of breast cancer. . She continues enhanced breast cancer screening with annual mammogram and breast MRI. Her last bilateral mammogram was on 06/21/2024, which demonstrated BI-RADS 1, category 3 density (Heterogeneously dense). Additionally,  "bilateral breast MRI was completed 12/21/2024, also with benign findings. She states she has new onset back pain X2 days. She has been taking tylenol without improvement. Pt reports current post menopausal state (age 48), menarche age 16, and states she was 31 y/o at the time of her first live birth.. She denies previous use of hormone replacement therapy (HRT), known DIPTI exposure, as well as mantle field radiation. She denies any known Ashkenazi Christianity descent. She denies any breast changes, palpable concerns, or nipple discharge. We reviewed her family history at length. She is agreeable to inquiring with family members about genetic testing. She will consider genetic testing for herself, but defers referral at this time. She is not on birth control and denies smoking hx.       Review of Systems   Constitutional:  Negative for activity change, appetite change, fatigue, fever and unexpected weight change.   Musculoskeletal:  Positive for back pain (new onset from this past weekend, lower right back).   Skin: Negative.    Neurological: Negative.    Psychiatric/Behavioral:  Negative for confusion.     A complete review of systems is negative other than that noted above in the HPI.       Objective   /72 (BP Location: Left arm, Patient Position: Sitting, Cuff Size: Standard)   Pulse 78   Temp (!) 97.2 °F (36.2 °C) (Temporal)   Ht 5' 6.5\" (1.689 m)   LMP 02/18/2023 (Approximate)   SpO2 99%   BMI 25.63 kg/m²       Physical Exam  Vitals and nursing note reviewed.   Constitutional:       Appearance: Normal appearance. She is normal weight.   Eyes:      Conjunctiva/sclera: Conjunctivae normal.   Chest:      Chest wall: No mass.   Breasts:     Right: No swelling, bleeding, inverted nipple, mass, nipple discharge, skin change or tenderness.      Left: No swelling, bleeding, inverted nipple, mass, nipple discharge, skin change or tenderness.      Comments: No dominant masses, nodularities, skin changes, or nipple " discharge noted. Dense breast tissue appreciated throughout.  Lymphadenopathy:      Upper Body:      Right upper body: No supraclavicular or axillary adenopathy.      Left upper body: No supraclavicular or axillary adenopathy.   Skin:     General: Skin is warm and dry.   Neurological:      Mental Status: She is alert. Mental status is at baseline.   Psychiatric:         Mood and Affect: Mood normal.         Behavior: Behavior normal.         Thought Content: Thought content normal.         Judgment: Judgment normal.

## 2025-04-10 ENCOUNTER — APPOINTMENT (OUTPATIENT)
Dept: LAB | Facility: CLINIC | Age: 50
End: 2025-04-10
Payer: COMMERCIAL

## 2025-04-10 ENCOUNTER — TRANSCRIBE ORDERS (OUTPATIENT)
Dept: LAB | Facility: CLINIC | Age: 50
End: 2025-04-10

## 2025-04-10 DIAGNOSIS — E55.9 AVITAMINOSIS D: ICD-10-CM

## 2025-04-10 DIAGNOSIS — Z76.89 REFERRAL OF PATIENT WITHOUT EXAMINATION OR TREATMENT: Primary | ICD-10-CM

## 2025-04-10 DIAGNOSIS — Z76.89 REFERRAL OF PATIENT WITHOUT EXAMINATION OR TREATMENT: ICD-10-CM

## 2025-04-10 DIAGNOSIS — R73.03 DIABETES MELLITUS, LATENT: ICD-10-CM

## 2025-04-10 LAB
25(OH)D3 SERPL-MCNC: 31.6 NG/ML (ref 30–100)
ALBUMIN SERPL BCG-MCNC: 4.9 G/DL (ref 3.5–5)
ALP SERPL-CCNC: 67 U/L (ref 34–104)
ALT SERPL W P-5'-P-CCNC: 16 U/L (ref 7–52)
ANION GAP SERPL CALCULATED.3IONS-SCNC: 9 MMOL/L (ref 4–13)
AST SERPL W P-5'-P-CCNC: 18 U/L (ref 13–39)
BASOPHILS # BLD AUTO: 0.03 THOUSANDS/ÂΜL (ref 0–0.1)
BASOPHILS NFR BLD AUTO: 1 % (ref 0–1)
BILIRUB SERPL-MCNC: 1.46 MG/DL (ref 0.2–1)
BUN SERPL-MCNC: 14 MG/DL (ref 5–25)
CALCIUM SERPL-MCNC: 9.3 MG/DL (ref 8.4–10.2)
CHLORIDE SERPL-SCNC: 104 MMOL/L (ref 96–108)
CO2 SERPL-SCNC: 27 MMOL/L (ref 21–32)
CREAT SERPL-MCNC: 0.6 MG/DL (ref 0.6–1.3)
EOSINOPHIL # BLD AUTO: 0.25 THOUSAND/ÂΜL (ref 0–0.61)
EOSINOPHIL NFR BLD AUTO: 4 % (ref 0–6)
ERYTHROCYTE [DISTWIDTH] IN BLOOD BY AUTOMATED COUNT: 13.5 % (ref 11.6–15.1)
EST. AVERAGE GLUCOSE BLD GHB EST-MCNC: 103 MG/DL
GFR SERPL CREATININE-BSD FRML MDRD: 108 ML/MIN/1.73SQ M
GLUCOSE P FAST SERPL-MCNC: 81 MG/DL (ref 65–99)
HBA1C MFR BLD: 5.2 %
HCT VFR BLD AUTO: 44.6 % (ref 34.8–46.1)
HGB BLD-MCNC: 14.5 G/DL (ref 11.5–15.4)
IMM GRANULOCYTES # BLD AUTO: 0.02 THOUSAND/UL (ref 0–0.2)
IMM GRANULOCYTES NFR BLD AUTO: 0 % (ref 0–2)
LYMPHOCYTES # BLD AUTO: 2.43 THOUSANDS/ÂΜL (ref 0.6–4.47)
LYMPHOCYTES NFR BLD AUTO: 40 % (ref 14–44)
MCH RBC QN AUTO: 28.1 PG (ref 26.8–34.3)
MCHC RBC AUTO-ENTMCNC: 32.5 G/DL (ref 31.4–37.4)
MCV RBC AUTO: 86 FL (ref 82–98)
MONOCYTES # BLD AUTO: 0.34 THOUSAND/ÂΜL (ref 0.17–1.22)
MONOCYTES NFR BLD AUTO: 6 % (ref 4–12)
NEUTROPHILS # BLD AUTO: 3.04 THOUSANDS/ÂΜL (ref 1.85–7.62)
NEUTS SEG NFR BLD AUTO: 49 % (ref 43–75)
NRBC BLD AUTO-RTO: 0 /100 WBCS
PLATELET # BLD AUTO: 293 THOUSANDS/UL (ref 149–390)
PMV BLD AUTO: 10.5 FL (ref 8.9–12.7)
POTASSIUM SERPL-SCNC: 4.1 MMOL/L (ref 3.5–5.3)
PROT SERPL-MCNC: 7.5 G/DL (ref 6.4–8.4)
RBC # BLD AUTO: 5.16 MILLION/UL (ref 3.81–5.12)
SODIUM SERPL-SCNC: 140 MMOL/L (ref 135–147)
WBC # BLD AUTO: 6.11 THOUSAND/UL (ref 4.31–10.16)

## 2025-04-10 PROCEDURE — 82306 VITAMIN D 25 HYDROXY: CPT

## 2025-04-10 PROCEDURE — 80053 COMPREHEN METABOLIC PANEL: CPT

## 2025-04-10 PROCEDURE — 85025 COMPLETE CBC W/AUTO DIFF WBC: CPT

## 2025-04-10 PROCEDURE — 36415 COLL VENOUS BLD VENIPUNCTURE: CPT

## 2025-04-29 ENCOUNTER — OCCMED (OUTPATIENT)
Dept: URGENT CARE | Facility: MEDICAL CENTER | Age: 50
End: 2025-04-29
Payer: OTHER MISCELLANEOUS

## 2025-04-29 DIAGNOSIS — G44.319 ACUTE POST-TRAUMATIC HEADACHE, NOT INTRACTABLE: Primary | ICD-10-CM

## 2025-04-29 PROCEDURE — 99284 EMERGENCY DEPT VISIT MOD MDM: CPT | Performed by: NURSE PRACTITIONER

## 2025-04-29 PROCEDURE — G0383 LEV 4 HOSP TYPE B ED VISIT: HCPCS | Performed by: NURSE PRACTITIONER

## 2025-05-01 ENCOUNTER — APPOINTMENT (OUTPATIENT)
Dept: URGENT CARE | Facility: MEDICAL CENTER | Age: 50
End: 2025-05-01
Payer: OTHER MISCELLANEOUS

## 2025-05-01 PROCEDURE — 99213 OFFICE O/P EST LOW 20 MIN: CPT | Performed by: NURSE PRACTITIONER

## 2025-05-13 ENCOUNTER — TELEPHONE (OUTPATIENT)
Age: 50
End: 2025-05-13

## 2025-05-13 NOTE — TELEPHONE ENCOUNTER
Pt called. Pt scheduled annual 01/02/2026 with Dr Maloney. Pt would like to be seen sooner. Pt on wait-list. Pt aware of message sent.

## 2025-05-20 DIAGNOSIS — B35.4 TINEA CORPORIS: ICD-10-CM

## 2025-05-21 RX ORDER — CLOTRIMAZOLE AND BETAMETHASONE DIPROPIONATE 10; .64 MG/G; MG/G
CREAM TOPICAL
Qty: 30 G | Refills: 2 | OUTPATIENT
Start: 2025-05-21

## 2025-06-13 ENCOUNTER — TELEPHONE (OUTPATIENT)
Dept: SURGICAL ONCOLOGY | Facility: CLINIC | Age: 50
End: 2025-06-13

## 2025-06-13 NOTE — TELEPHONE ENCOUNTER
Called patient to reschedule the canceled appointment for 6/6/25 with Anahy. There was no answer so left a detailed message with reason for call and call back number to hope line.

## 2025-06-24 ENCOUNTER — HOSPITAL ENCOUNTER (OUTPATIENT)
Dept: RADIOLOGY | Age: 50
Discharge: HOME | End: 2025-06-24
Payer: COMMERCIAL

## 2025-06-24 DIAGNOSIS — Z12.31 VISIT FOR SCREENING MAMMOGRAM: ICD-10-CM

## 2025-06-24 PROCEDURE — 77067 SCR MAMMO BI INCL CAD: CPT

## 2025-06-24 PROCEDURE — 77063 BREAST TOMOSYNTHESIS BI: CPT

## 2025-08-10 ENCOUNTER — HOSPITAL ENCOUNTER (EMERGENCY)
Facility: HOSPITAL | Age: 50
Discharge: HOME | End: 2025-08-10
Attending: EMERGENCY MEDICINE | Admitting: EMERGENCY MEDICINE
Payer: COMMERCIAL